# Patient Record
Sex: FEMALE | Race: BLACK OR AFRICAN AMERICAN | Employment: FULL TIME | URBAN - METROPOLITAN AREA
[De-identification: names, ages, dates, MRNs, and addresses within clinical notes are randomized per-mention and may not be internally consistent; named-entity substitution may affect disease eponyms.]

---

## 2017-01-18 ENCOUNTER — TRANSFERRED RECORDS (OUTPATIENT)
Dept: MULTI SPECIALTY CLINIC | Facility: CLINIC | Age: 42
End: 2017-01-18

## 2017-01-20 DIAGNOSIS — Z98.84 BARIATRIC SURGERY STATUS: Primary | ICD-10-CM

## 2017-01-20 RX ORDER — CYANOCOBALAMIN 1000 UG/ML
1 INJECTION, SOLUTION INTRAMUSCULAR; SUBCUTANEOUS
Qty: 1 ML | Refills: 11 | Status: SHIPPED | OUTPATIENT
Start: 2017-01-20 | End: 2018-12-03

## 2017-01-20 NOTE — TELEPHONE ENCOUNTER
cyanocobalamin (VITAMIN B12) 1000 MCG/ML injection  Last Written Prescription Date:  7/20/15  Last Fill Quantity: 1ml,   # refills: 11  Last visit:  2/10/16  Next visit:  3/7/17  bd tb syringe ordered per pt previous hx.  Routed because: cyanocobalamin (VITAMIN B12) 1000 MCG/ML injection. Not on SO protocol.

## 2017-02-03 ENCOUNTER — OFFICE VISIT (OUTPATIENT)
Dept: PSYCHOLOGY | Facility: CLINIC | Age: 42
End: 2017-02-03
Payer: COMMERCIAL

## 2017-02-03 DIAGNOSIS — F41.9 ANXIETY DISORDER, UNSPECIFIED TYPE: Primary | ICD-10-CM

## 2017-02-03 NOTE — PROGRESS NOTES
"Name:  Deisi Miner  Mrn: [id number]  Date of visit: 2/3/2017    PSYCHOLOGY OUTPATIENT VISIT NOTE    PRESENTING PROBLEMS/SYMPTOMS: Pregnancy with twins following IVF, birth on 10/18/14 and loss of Altaf on 11/5/14.  Grief.  Continuing Anxiety and difficulty with relaxing, (\"I always have to be thinking about something\".)  Muscle tension in shoulders and neck. Irritability. ( = Altagracia; Daughter = Moira, son = Altaf.) .  INTERVENTION AND RESPONSE:  CBT, Individual.  Presented with update of past 3 mos.  Realizing family disconnect and busy with work.  Able to take Sat, \"I need a day.\"  Fmaily of origin part of \"cult Lutheran\" and feeling I'm and outsider, loss and death of her family.  Belief, \"this is how it is so get over it.\"  Fidgety and restless, \"what to do to help this?\"  Sadness.  Fears of infinite sadness.  Option of working through sadness, inner child work.  Expressed interest and sense that this is where her sadness lies. Also reinforced progress.  HW: awareness of \"child sadness.\"    Previous visit: (being \"laughed at\" at work) \"feeling different from others\" starting in childhood.  Heavy heart, heart \"crumbling apart; don't let it.\"  Tense shoulders and neck, trying to rationalize, \"I just have to cope by myself.\"  If she allows others to see her tears, \"what will they think about me?\"  Fears. Previous visit:  Caught between \"I should fix it\" and \"they should fix it.\"  Anger, \"I can't just let anger rip,\" and \"not feeling safe,\" wanting to escape (feet).  Pattern.  Practiced probe: \"you're not safe.\"  Moved slowly away from therapist, crossed arms, \"I am holding myself together.\"  Practiced, \"yes I am.\"  Discussed and noted parts of body bracing (top part) and wanting to escape (lower part) and sense of tension between these two impulses.)   ASSESSMENT:  The patient presented as casually dressed and groomed.  Feet very restless.  Difficulty with grief around disconnect from family.  Sadness and " "on verge of tears.  Continuing context: Zeina death on 11/5/14. Doing well with use of social support and continuing difficulty finding more support in partner.  Continued importance of using social support, friends, mom in law.  Will be important to help her process grief around her family's lack of support anniversary of Altaf's death. Triggers: teachers \"not understanding\" kids who act out.  Wanting to \"get away from it,\" leaning back in her chair, \"pack it in; freeze and hide my emotions,\" difficulty finding words vs wanting to escape.   Character strategy, industrious overfocused.    DIAGNOSES:  Unspecified Anxiety disorder  PLAN:   Psychotherapy is medically necessary to treat anxiety.    Total time spent equals 55  minutes of individual psychotherapy       "

## 2017-03-03 ENCOUNTER — OFFICE VISIT (OUTPATIENT)
Dept: PSYCHOLOGY | Facility: CLINIC | Age: 42
End: 2017-03-03
Payer: COMMERCIAL

## 2017-03-03 DIAGNOSIS — F41.1 GAD (GENERALIZED ANXIETY DISORDER): Primary | ICD-10-CM

## 2017-03-03 NOTE — MR AVS SNAPSHOT
After Visit Summary   3/3/2017    Deisi Miner    MRN: [id number]           Patient Information     Date Of Birth          1975        Visit Information        Provider Department      3/3/2017 3:00 PM Marcela Dobson, PhD [facility]         Today's Diagnoses     DENISE (generalized anxiety disorder)    -  1       Follow-ups after your visit        Your next 10 appointments already scheduled     Mar 07, 2017  8:45 AM CST   (Arrive by 8:30 AM)   Return Visit with Lam Maher MD   [facility] ([facility])    [address]  [address] [facility] [address]   [phone]            Apr 05, 2017  1:00 PM CDT   Return Visit with Marcela Dobson, PhD [facility]   [facility]  ([facility])    [facility]  [address]rd [address]  [address]  [facility] [address] [phone]            Apr 06, 2017  3:30 PM CDT   MyChart Physical Adult with Samantha Sultana MD   [facility] ([facility])    [address] [place] [id number] [phone]            May 08, 2017  3:00 PM CDT   Return Visit with Marcela Dobson, PhD    [facility]  ([facility])    [facility]  [address]rd [address], [address]  [address]  [facility] [id number] [phone]              Who to contact     Please call your clinic at [phone] to:    Ask questions about your health    Make or cancel appointments    Discuss your medicines    Learn about your test results    Speak to your doctor   If you have compliments or concerns about an experience at your clinic, or if you wish to file a complaint, please contact [facility] Patient Relations at [phone] or email us at [email or web address]         Additional Information About Your Visit         [employer] Information     [employer] gives you secure access to your electronic health record. If you see a primary care provider, you can also send messages to your care team and make appointments. If you have questions, please call your primary care clinic.  If you do not have a primary care provider, please call [phone] and they will assist you.      [employer] is an electronic gateway that provides easy, online access to your medical records. With [employer], you can request a clinic appointment, read your test results, renew a prescription or communicate with your care team.     To access your existing account, please contact your [facility] or call [phone] for assistance.        Care EveryWhere ID     This is your Care EveryWhere ID. This could be used by other organizations to access your [place] medical records  [id number]         Blood Pressure from Last 3 Encounters:   02/10/16 121/78   02/07/16 120/80   11/24/15 109/75    Weight from Last 3 Encounters:   02/10/16 108.6 kg (239 lb 8 oz)   02/07/16 106.6 kg (235 lb)   11/24/15 105.4 kg (232 lb 4.8 oz)              We Performed the Following     Individual Psychotherapy - Psychotherapy with pt and/or family present 45 mins [38-52 mins] ([id number])        Primary Care Provider Office Phone # Fax #    Alejandro Gifford [phone]       [facility] [address] [place] [id number]        Thank you!     Thank you for choosing [facility]   for your care. Our goal is always to provide you with excellent care. Hearing back from our patients is one way we can continue to improve our services. Please take a few minutes to complete the written survey that you may receive in the mail after your visit with us. Thank you!             Your Updated Medication List - Protect others around you: Learn how to safely use, store and throw away your medicines at  "www.disposemymeds.org.          This list is accurate as of: 3/3/17  5:28 PM.  Always use your most recent med list.                   Brand Name Dispense Instructions for use    cyanocobalamin 1000 MCG/ML injection    VITAMIN B12    1 mL    Inject 1 mL (1,000 mcg) Subcutaneous every 30 days       phentermine 37.5 MG tablet    ADIPEX-P    30 tablet    Take 0.5 tablets (18.75 mg) by mouth every morning (before breakfast)       sertraline 100 MG tablet    ZOLOFT    90 tablet    Take 1 tablet (100 mg) by mouth daily       topiramate 25 MG tablet    TOPAMAX    90 tablet    25 mg at bedtime for 1 week, 50 mg at bedtime for 1 week and 75 mg daily at bedtime thereafter       Tuberculin-Allergy Syringes 25G X 5/8\" 1 ML [facility]    B-D TB SYRINGE    12 each    Use one every 30 days for cyanocobalamin (VITAMIN B12) injection.       VITAMIN D (CHOLECALCIFEROL) PO      Take by mouth daily         "

## 2017-03-03 NOTE — PROGRESS NOTES
"Name:  Deisi Miner  Mrn: [id number]  Date of visit: 3/3/2017    PSYCHOLOGY OUTPATIENT VISIT NOTE    PRESENTING PROBLEMS/SYMPTOMS: Pregnancy with twins following IVF, birth on 10/18/14 and loss of Altaf on 11/5/14.  Grief.  Continuing Anxiety and difficulty with relaxing, (\"I always have to be thinking about something\".)  Muscle tension in shoulders and neck. Irritability. ( = Altagracia; Daughter = Moira, son = Altaf.) .  INTERVENTION AND RESPONSE:  CBT, Individual.  Presented with new journal, writing letter to \"kimmie Lipscomb\" identifying her \"traumas\", letter to teen self, list of \"triggers,\" \"I know I'm anxious\" and increasing awareness of these sxs, neck and shoulder tension.  Focused on coping, Using PMR, standing desk, time with Moira, eating more produce, more water.  Discussed and Reinforced this progress.  Feeling \"rattled at work\" described stressor, racial and power aspects, and getting support from boss, unsure if she is \"disappointing,\" insomnia and worry.  Feedback that she apologizes a lot and recognizing this.  Mentoring students.  Wanting others to \"notice I'm super good,\" hx of embarassment re large family and poverty, not accepted by sibs and family but wanting to protect them, fears of being put in foster care.  Beliefs: that's how it is; no one needs to know.  Now sense of \"not knowing what to do, not having a guide\" while also believing that \"I need to know.\"    HW: writing, what does child self need and want?  Adult self?    Previous visit: (being \"laughed at\" at work) \"feeling different from others\" starting in childhood.  Heavy heart, heart \"crumbling apart; don't let it.\"  Tense shoulders and neck, trying to rationalize, \"I just have to cope by myself.\"  If she allows others to see her tears, \"what will they think about me?\"  Fears. Previous visit:  Caught between \"I should fix it\" and \"they should fix it.\"  Anger, \"I can't just let anger rip,\" and \"not feeling safe,\" wanting to " "escape (feet).  Pattern.  Practiced probe: \"you're not safe.\"  Moved slowly away from therapist, crossed arms, \"I am holding myself together.\"  Practiced, \"yes I am.\"  Discussed and noted parts of body bracing (top part) and wanting to escape (lower part) and sense of tension between these two impulses.)   ASSESSMENT:  The patient presented as casually dressed and groomed.  Feet very restless and moving around in chair, pt aware of this.  MOtivation towards working in therapy as her daughter is older and she is becoming more aware of the impact of anxiety on self.  Given sxs: restlessness, insomnia, irritability, muscle tension (neck shoulders) and difficulty controlling and distress, continuing over 6mos more than 50% of days pt does meet criteria for DENISE. Continuing context: Pregnancy with twins following IVF, birth on 10/18/14 and loss of Altaf on 11/5/14.  Grief. Ethans death on 11/5/14.   Continued importance of using social support, friends, mom in law.  Some difficulty with partner communication.  Will be important to help her process grief around her family's lack of support (chronic) Altaf's death. Family of origin part of \"cult Mosque\" and feeling I'm and outsider, loss and death of her family.  Belief, \"this is how it is so get over it.\"  Fidgety and restless, \"what to do to help this?\"  Sadness.  Fears of infinite sadness. Triggers: teachers \"not understanding\" kids who act out.  Wanting to \"get away from it,\" leaning back in her chair, \"pack it in; freeze and hide my emotions,\" difficulty finding words vs wanting to escape.   Character strategy, industrious overfocused.      DIAGNOSES:  DENISE  PLAN:   Psychotherapy is medically necessary to treat anxiety.    Total time spent equals 52  minutes of individual psychotherapy       "

## 2017-03-17 ENCOUNTER — MYC MEDICAL ADVICE (OUTPATIENT)
Dept: ENDOCRINOLOGY | Facility: CLINIC | Age: 42
End: 2017-03-17

## 2017-04-17 DIAGNOSIS — F41.1 GAD (GENERALIZED ANXIETY DISORDER): ICD-10-CM

## 2017-04-17 RX ORDER — SERTRALINE HYDROCHLORIDE 100 MG/1
100 TABLET, FILM COATED ORAL DAILY
Qty: 90 TABLET | Refills: 0 | Status: SHIPPED | OUTPATIENT
Start: 2017-04-17 | End: 2017-05-01

## 2017-04-17 NOTE — TELEPHONE ENCOUNTER
Pharmacy sent a refill request for Zoloft. Pt has AE scheduled for 05/01/2017. Refill sent per protocol. Va Hudson RN

## 2017-04-24 ENCOUNTER — OFFICE VISIT (OUTPATIENT)
Dept: ENDOCRINOLOGY | Facility: CLINIC | Age: 42
End: 2017-04-24

## 2017-04-24 VITALS
SYSTOLIC BLOOD PRESSURE: 118 MMHG | BODY MASS INDEX: 50.95 KG/M2 | DIASTOLIC BLOOD PRESSURE: 81 MMHG | HEIGHT: 60 IN | OXYGEN SATURATION: 96 % | HEART RATE: 87 BPM | WEIGHT: 259.5 LBS

## 2017-04-24 DIAGNOSIS — E66.01 MORBID OBESITY, UNSPECIFIED OBESITY TYPE (H): Primary | ICD-10-CM

## 2017-04-24 RX ORDER — TOPIRAMATE 25 MG/1
TABLET, FILM COATED ORAL
Qty: 90 TABLET | Refills: 1 | Status: SHIPPED | OUTPATIENT
Start: 2017-04-24 | End: 2017-05-22

## 2017-04-24 RX ORDER — PHENTERMINE HYDROCHLORIDE 15 MG/1
15 CAPSULE ORAL EVERY MORNING
Qty: 30 CAPSULE | Refills: 1 | Status: SHIPPED | OUTPATIENT
Start: 2017-04-24 | End: 2017-05-22

## 2017-04-24 ASSESSMENT — ENCOUNTER SYMPTOMS
HOT FLASHES: 0
SINUS CONGESTION: 0
RECTAL PAIN: 0
HYPOTENSION: 0
TROUBLE SWALLOWING: 0
SINUS PAIN: 0
JAUNDICE: 0
BLOOD IN STOOL: 0
DYSURIA: 0
CHILLS: 0
DIARRHEA: 0
COUGH: 0
SHORTNESS OF BREATH: 0
PARALYSIS: 0
SYNCOPE: 0
MUSCLE WEAKNESS: 1
FEVER: 0
NECK MASS: 0
EYE PAIN: 0
TACHYCARDIA: 0
POLYPHAGIA: 0
RESPIRATORY PAIN: 0
POSTURAL DYSPNEA: 0
ALTERED TEMPERATURE REGULATION: 0
JOINT SWELLING: 0
LEG SWELLING: 0
ORTHOPNEA: 0
POLYDIPSIA: 0
PANIC: 0
SPUTUM PRODUCTION: 0
SKIN CHANGES: 0
TASTE DISTURBANCE: 0
CONSTIPATION: 0
LOSS OF CONSCIOUSNESS: 0
EXERCISE INTOLERANCE: 0
HEARTBURN: 0
DOUBLE VISION: 0
DISTURBANCES IN COORDINATION: 0
DECREASED APPETITE: 0
MYALGIAS: 1
RECTAL BLEEDING: 0
INCREASED ENERGY: 0
BACK PAIN: 1
HEMATURIA: 0
DEPRESSION: 0
BOWEL INCONTINENCE: 0
WEIGHT LOSS: 0
HALLUCINATIONS: 0
MUSCLE CRAMPS: 0
NAUSEA: 0
SWOLLEN GLANDS: 0
DIFFICULTY URINATING: 0
SPEECH CHANGE: 0
TREMORS: 0
BREAST MASS: 0
POOR WOUND HEALING: 0
FLANK PAIN: 0
NECK PAIN: 1
SORE THROAT: 0
DECREASED CONCENTRATION: 1
ARTHRALGIAS: 1
EYE IRRITATION: 0
WEAKNESS: 0
SNORES LOUDLY: 0
BRUISES/BLEEDS EASILY: 0
HOARSE VOICE: 0
HEMOPTYSIS: 0
DYSPNEA ON EXERTION: 0
BREAST PAIN: 0
TINGLING: 0
WEIGHT GAIN: 0
CLAUDICATION: 0
INSOMNIA: 1
NERVOUS/ANXIOUS: 1
NIGHT SWEATS: 0
PALPITATIONS: 0
BLOATING: 0
HYPERTENSION: 0
WHEEZING: 0
VOMITING: 0
EYE WATERING: 0
HEADACHES: 1
ABDOMINAL PAIN: 0
SMELL DISTURBANCE: 0
LIGHT-HEADEDNESS: 0
NAIL CHANGES: 0
NUMBNESS: 0
FATIGUE: 0
COUGH DISTURBING SLEEP: 0
DIZZINESS: 0
STIFFNESS: 1
DECREASED LIBIDO: 1
LEG PAIN: 0
SLEEP DISTURBANCES DUE TO BREATHING: 0
MEMORY LOSS: 0
SEIZURES: 0
EYE REDNESS: 0

## 2017-04-24 NOTE — PATIENT INSTRUCTIONS
PLAN:   Restart phentermine 15mg  Topiramate ramp up to 75mg slowly to avoid tingling.      FOLLOW-UP:    4 weeks Teri Ramirez PA-C return MW  MEDICATION STARTED AT THIS APPOINTMENT    We are starting Phentermine. Take one tablet in the morning.  Call the nurse at [phone] if you have any questions or concerns. (Do not stop taking it if you don't think it's working. For some people it works without them knowing it.)    Phentermine is being prescribed because you identified hunger as one of the main causes for your extra weight.      Our patients on Phentermine find that they:    >feel less hunger    >find it easier to push the plate away   >have an easier time eating less    For some of our patients, these feelings are very real and immediate. For other patients, the feelings are less obvious. They don't feel much of a change but find they've lost weight. Like all weight loss medications, Phentermine  works best when you help it work. This means:  1. Having less tempting high calorie (fattening) food around the house or office. (For people with strong cravings this is very important.)   2. Staying away from situations or people that may trigger your cravings .   3. Eating out only one time or less each week.  4. Eating your meals at a table with the TV or computer off.    Side-effects. Phentermine is generally well tolerated. The main side-effects we see are feelings of racing pulse or rapid heart beat. Some people can get an elevated blood pressure. Because of this we may have you come back within a week or so of starting the medication for a blood pressure check.         In order to get refills of this or any medication we prescribe you must be seen in the medical weight mgmt clinic every 2-3 months. Please have your pharmacy fax a refill request to [phone].      MEDICATION STARTED AT THIS APPOINTMENT  We are starting topiramate at bedtime.  Start one tab, 25 mg, for a week. Go up to 50 mg (2 tabs) for  the next week. At the third week, take   3 tabs (75 mg).  Stay at 3 tabs until you are seen again. Call the nurse at [phone] if you have any questions or concerns. (Do not stop taking it if you don't think it's working. For some people it works even though they do not feel much different.)    Topiramate (Topamax) is a medication that is used most often to treat migraine headaches or for seizures. It has also been found to help with weight loss. Although it's not currently FDA approved for weight loss, it has been used safely for a number of years to help people who are carrying extra weight.     Just how topiramate helps with weight loss has not been exactly determined. However it seems to work on areas of the brain to quiet down signals related to eating.      Topiramate may make you:    >feel less interest in eating in between meals   >think less about food and eating   >find it easier to push the plate away   >find giving up pop easier    >have an easier time eating less    For some of our patients, the pills work right away. They feel and think quite differently about food. Other patients don't feel much of a change but find in fact they have lost weight! Like all weight loss medications, topiramate works best when you help it work.  This means:    1) Have less tempting high calorie (fattening) food around the house or office    2) Have lower calorie food (fruits, vegetables,low fat meats and dairy) for snacks    3) Eat out only one time or less each week.   4) Eat your meals at a table with the TV or computer off.    Side-effects. Topiramate is generally well tolerated. The main side-effects we see are:   Tingling in hands,feet, or face (usually not very troublesome)   Mental confusion and word finding trouble (about 10% of patients have this.)     Feeling sleepy or a bit dopey- this goes away very soon after starting.    One of the dangers of topiramate is the possibility of birth defects--if you get  pregnant when you are on it, there is the risk that your baby will be born with a cleft lip or palate.  If you are on topiramate and of child bearing age, you need to be on a reliable form of birth control or refrain from sexual intercourse.     Please refer to the pharmacy insert for more information on side-effects. Since many pharmacists are not familiar with the use of topiramate in weight loss, calling the clinic will get you the most accurate information on the use of this medication for weight loss.     In order to get refills of this or any medication we prescribe you must be seen in the medical weight mgmt clinic every 2-3 months. Please have your pharmacy fax a refill request to [phone].

## 2017-04-24 NOTE — LETTER
"2017       RE: Deisi Miner  [address] [place] [place] [address]     Dear Colleague,    Thank you for referring your patient, Deisi Miner, to the [facility]NAG[facility] at [facility]. Please see a copy of my visit note below.    Return Medical Weight Management Note     Deisi Miner  MRN:  [id number]  :  1975  MARIANO:  2017    Dear Alejandro Gifford,    I had the pleasure of seeing your patient Deisi Miner.  She is a 42 year old female who I am continuing to see for treatment of obesity related to:    No flowsheet data found.    INTERVAL HISTORY:  Note from Dr Fritz 2/10/16\"  \"She is s/p laparoscopic sleeve gastrectomy  with preoperative weight of 245 and postoperative lowest weight was 164. She was pregnant in . Her post-delivery weight was 184 in 2014. She gained almost 20 lbs in the past year\"    She was taking phentermine 18.75 and topiramate 75mg.  She wasn't able to follow up and ran out of phentermine and the topiramate caused some tingling at 75mg so she discontinued both of them over a year ago.  She felt they both helped.      CURRENT WEIGHT:   259 lbs 8 oz    Wt Readings from Last 4 Encounters:   17 259 lb 8 oz   02/10/16 239 lb 8 oz   16 235 lb   11/24/15 232 lb 4.8 oz     Height:  5' 0\"  Body Mass Index:  Body mass index is 50.68 kg/(m^2).  Vitals:  /81 (BP Location: Left arm)  Pulse 87  Ht 5'  Wt 259 lb 8 oz  SpO2 96%  BMI 50.68 kg/m2    Initial consult weight was 232 on 2015.  Weight change since last seen on Visit date not found is up 20 pounds.   Total gain is 27 pounds.    Diet and Activity Changes Since Last Visit Reviewed With Patient 2017   I have made the following changes to my diet since my last visit: more fruit veggies ssmall meals   With regards to my diet, I am still struggling with: portion control   For breakfast, I typically eat: yogurt orvoatmeal   For " lunch, I typically eat: protein and veggie   For supper, I typically eat: protein veggie bread   For snack(s), I typically eat: crackers pretzils   I have made the following changes to my activity/exercise since my last visit: activity eachday walking   With regards to my activity/exercise, I am still struggling with: knee pain       Review of Systems     Constitutional:  Negative for fever, chills, weight loss, weight gain, fatigue, decreased appetite, night sweats, recent stressors, height gain, height loss, post-operative complications, incisional pain, hallucinations, increased energy, hyperactivity and confused.   HENT:  Negative for ear pain, hearing loss, tinnitus, nosebleeds, trouble swallowing, hoarse voice, mouth sores, sore throat, ear discharge, tooth pain, gum tenderness, taste disturbance, smell disturbance, hearing aid, bleeding gums, dry mouth, sinus pain, sinus congestion and neck mass.    Eyes:  Negative for double vision, pain, redness, eye pain, decreased vision, eye watering, eye bulging, eye dryness, flashing lights, spots, floaters, strabismus, tunnel vision, jaundice and eye irritation.   Respiratory:   Negative for cough, hemoptysis, sputum production, shortness of breath, wheezing, sleep disturbances due to breathing, snores loudly, respiratory pain, dyspnea on exertion, cough disturbing sleep and postural dyspnea.    Cardiovascular:  Negative for chest pain, dyspnea on exertion, palpitations, orthopnea, claudication, leg swelling, fingers/toes turn blue, hypertension, hypotension, syncope, history of heart murmur, chest pain on exertion, chest pain at rest, pacemaker, few scattered varicosities, leg pain, sleep disturbances due to breathing, tachycardia, light-headedness, exercise intolerance and edema.   Gastrointestinal:  Negative for heartburn, nausea, vomiting, abdominal pain, diarrhea, constipation, blood in stool, melena, rectal pain, bloating, hemorrhoids, bowel incontinence,  "jaundice, rectal bleeding, coffee ground emesis and change in stool.   Genitourinary:  Positive for decreased libido and excessive menstruation. Negative for bladder incontinence, dysuria, urgency, hematuria, flank pain, vaginal discharge, difficulty urinating, genital sores, dyspareunia, nocturia, voiding less frequently, arousal difficulty, abnormal vaginal bleeding, menstrual changes, hot flashes, vaginal dryness and postmenopausal bleeding.   Musculoskeletal:  Positive for myalgias, back pain, arthralgias, stiffness, neck pain and muscle weakness. Negative for joint swelling, muscle cramps, bone pain and fracture.   Skin:  Negative for nail changes, itching, poor wound healing, rash, hair changes, skin changes, acne, warts, poor wound healing, scarring, flaky skin, Raynaud's phenomenon, sensitivity to sunlight and skin thickening.   Neurological:  Positive for headaches. Negative for dizziness, tingling, tremors, speech change, seizures, loss of consciousness, weakness, light-headedness, numbness, disturbances in coordination, memory loss, difficulty walking and paralysis.   Endo/Heme:  Negative for anemia, swollen glands and bruises/bleeds easily.   Psychiatric/Behavioral:  Positive for decreased concentration and mood swings. Negative for depression, hallucinations, memory loss and panic attacks.    Breast:  Negative for breast discharge, breast mass, breast pain and nipple retraction.   Endocrine:  Negative for altered temperature regulation, polyphagia, polydipsia, unwanted hair growth and change in facial hair.      MEDICATIONS:   Current Outpatient Prescriptions   Medication     sertraline (ZOLOFT) 100 MG tablet     Tuberculin-Allergy Syringes (B-D TB SYRINGE) 25G X 5/8\" 1 ML MISC     cyanocobalamin (VITAMIN B12) 1000 MCG/ML injection     VITAMIN D, CHOLECALCIFEROL, PO     topiramate (TOPAMAX) 25 MG tablet     phentermine (ADIPEX-P) 37.5 MG tablet     No current facility-administered medications for this " visit.        Weight Loss Medication History Reviewed With Patient 4/24/2017   Which weight loss medications are you currently taking on a regular basis?  None   If you are not taking a weight loss medication that was prescribed to you, please indicate why: I did not like the side effects, It did not seem to be helping me   Are you having any side effects from the weight loss medication that we have prescribed you? No   If you are having side effects please describe: -       ASSESSMENT:   42 y.o. Female s/p Lap sleeve gastrectomy here for MWM follow up.  Weight regain since 2014 pregnancy.      PLAN:   Restart phentermine 15mg  Topiramate ramp up to 75mg slowly to avoid tingling.      FOLLOW-UP:    4 weeks Teri Ramirez PA-C    Time: 20 min spent on evaluation, management, counseling, education, & motivational interviewing with greater than 50 % of the total time was spent on counseling and coordinating care    Sincerely,    Teri Ramirez PA-C

## 2017-04-24 NOTE — MR AVS SNAPSHOT
After Visit Summary   4/24/2017    Deisi Miner    MRN: [id number]           Patient Information     Date Of Birth          1975        Visit Information        Provider Department      4/24/2017 9:45 AM Teri Ramirez PA-C [facility] Medical Weight Management        Today's Diagnoses     Morbid obesity, unspecified obesity type (H)    -  1      Care Instructions    PLAN:   Restart phentermine 15mg  Topiramate ramp up to 75mg slowly to avoid tingling.      FOLLOW-UP:    4 weeks Teri Ramirez PA-C return MWM  MEDICATION STARTED AT THIS APPOINTMENT    We are starting Phentermine. Take one tablet in the morning.  Call the nurse at [phone] if you have any questions or concerns. (Do not stop taking it if you don't think it's working. For some people it works without them knowing it.)    Phentermine is being prescribed because you identified hunger as one of the main causes for your extra weight.      Our patients on Phentermine find that they:    >feel less hunger    >find it easier to push the plate away   >have an easier time eating less    For some of our patients, these feelings are very real and immediate. For other patients, the feelings are less obvious. They don't feel much of a change but find they've lost weight. Like all weight loss medications, Phentermine  works best when you help it work. This means:  1. Having less tempting high calorie (fattening) food around the house or office. (For people with strong cravings this is very important.)   2. Staying away from situations or people that may trigger your cravings .   3. Eating out only one time or less each week.  4. Eating your meals at a table with the TV or computer off.    Side-effects. Phentermine is generally well tolerated. The main side-effects we see are feelings of racing pulse or rapid heart beat. Some people can get an elevated blood pressure. Because of this we may have you come back within a week or so of  starting the medication for a blood pressure check.         In order to get refills of this or any medication we prescribe you must be seen in the medical weight mgmt clinic every 2-3 months. Please have your pharmacy fax a refill request to [phone].      MEDICATION STARTED AT THIS APPOINTMENT  We are starting topiramate at bedtime.  Start one tab, 25 mg, for a week. Go up to 50 mg (2 tabs) for the next week. At the third week, take   3 tabs (75 mg).  Stay at 3 tabs until you are seen again. Call the nurse at [phone] if you have any questions or concerns. (Do not stop taking it if you don't think it's working. For some people it works even though they do not feel much different.)    Topiramate (Topamax) is a medication that is used most often to treat migraine headaches or for seizures. It has also been found to help with weight loss. Although it's not currently FDA approved for weight loss, it has been used safely for a number of years to help people who are carrying extra weight.     Just how topiramate helps with weight loss has not been exactly determined. However it seems to work on areas of the brain to quiet down signals related to eating.      Topiramate may make you:    >feel less interest in eating in between meals   >think less about food and eating   >find it easier to push the plate away   >find giving up pop easier    >have an easier time eating less    For some of our patients, the pills work right away. They feel and think quite differently about food. Other patients don't feel much of a change but find in fact they have lost weight! Like all weight loss medications, topiramate works best when you help it work.  This means:    1) Have less tempting high calorie (fattening) food around the house or office    2) Have lower calorie food (fruits, vegetables,low fat meats and dairy) for snacks    3) Eat out only one time or less each week.   4) Eat your meals at a table with the TV or computer  off.    Side-effects. Topiramate is generally well tolerated. The main side-effects we see are:   Tingling in hands,feet, or face (usually not very troublesome)   Mental confusion and word finding trouble (about 10% of patients have this.)     Feeling sleepy or a bit dopey- this goes away very soon after starting.    One of the dangers of topiramate is the possibility of birth defects--if you get pregnant when you are on it, there is the risk that your baby will be born with a cleft lip or palate.  If you are on topiramate and of child bearing age, you need to be on a reliable form of birth control or refrain from sexual intercourse.     Please refer to the pharmacy insert for more information on side-effects. Since many pharmacists are not familiar with the use of topiramate in weight loss, calling the clinic will get you the most accurate information on the use of this medication for weight loss.     In order to get refills of this or any medication we prescribe you must be seen in the medical weight mgmt clinic every 2-3 months. Please have your pharmacy fax a refill request to [phone].                Follow-ups after your visit        Follow-up notes from your care team     Return in 4 weeks (on 5/22/2017).      Your next 10 appointments already scheduled     May 01, 2017  9:30 AM CDT   [facility]   [facility] ([facility])    [address] [place] [address]   [phone]            May 08, 2017  3:00 PM CDT   Return Visit with Marcela Dobson, PhD    [facility]  ([facility])    [facility]  [address]rd [address]  [address] [address]   [phone]            Jun 06, 2017  2:00 PM CDT   Return Visit with Marcela Dobson, PhD MARJORIE   [facility]  ([facility])    [facility]  [address]  [facility] [address]   [phone]            Jun 20, 2017  3:00 PM CDT   Return Visit with Marcela Dobson, PhD MARJORIE   [facility]  ([facility])    [facility]  [address]rd [address], [address]  [facility] [address]   [phone]            Jul 11, 2017  3:00 PM CDT   Return Visit with Marcela Dobson, PhD MARJORIE   [facility]  ([facility])    [facility]  [address]rd [address]  [facility] [address]   [phone]            Jul 25, 2017  3:00 PM CDT   Return Visit with Marcela Dobson, PhD MARJORIE   [facility]  ([facility])    [facility]  [address]rd [address]  [address]  [facility] [id number] [phone]              Who to contact     Please call your clinic at [phone] to:    Ask questions about your health    Make or cancel appointments    Discuss your medicines    Learn about your test results    Speak to your doctor   If you have compliments or concerns about an experience at your clinic, or if you wish to file a complaint, please contact [facility] Patient Relations at [phone] or email us at [email or web address]         Additional Information About Your Visit        MyChart Information     Message System[employer] gives you secure access to your electronic health record. If you see a primary care provider, you can also send messages to your care team and make appointments. If you have questions, please call your primary care clinic.  If you do not have a primary care provider, please call [phone] and they will assist you.      [employer] is an electronic gateway that provides easy, online access to your medical records. With [employer], you can request a clinic appointment, read your test results, renew a prescription or communicate with your care team.     To access your existing  account, please contact your [facility] or call [phone] for assistance.        Care EveryWhere ID     This is your Care EveryWhere ID. This could be used by other organizations to access your [place] medical records  [id number]        Your Vitals Were     Pulse Height Pulse Oximetry BMI (Body Mass Index)          87 5' 96% 50.68 kg/m2         Blood Pressure from Last 3 Encounters:   04/24/17 118/81   02/10/16 121/78   02/07/16 120/80    Weight from Last 3 Encounters:   04/24/17 259 lb 8 oz   02/10/16 239 lb 8 oz   02/07/16 235 lb              Today, you had the following     No orders found for display         Today's Medication Changes          These changes are accurate as of: 4/24/17 10:33 AM.  If you have any questions, ask your nurse or doctor.               These medicines have changed or have updated prescriptions.        Dose/Directions    * phentermine 37.5 MG tablet   Commonly known as:  ADIPEX-P   This may have changed:  Another medication with the same name was added. Make sure you understand how and when to take each.   Used for:  Non morbid obesity, unspecified obesity type   Changed by:  Lam Maher MD        Dose:  18.75 mg   Take 0.5 tablets (18.75 mg) by mouth every morning (before breakfast)   Quantity:  30 tablet   Refills:  1       * phentermine 15 MG capsule   This may have changed:  You were already taking a medication with the same name, and this prescription was added. Make sure you understand how and when to take each.   Used for:  Morbid obesity, unspecified obesity type (H)   Changed by:  Teri Ramirez PA-C        Dose:  15 mg   Take 1 capsule (15 mg) by mouth every morning   Quantity:  30 capsule   Refills:  1       * topiramate 25 MG tablet   Commonly known as:  TOPAMAX   This may have changed:  Another medication with the same name was added. Make sure you understand how and when to take each.   Used for:  Non morbid obesity  due to excess calories   Changed by:  Lam Maher MD        25 mg at bedtime for 1 week, 50 mg at bedtime for 1 week and 75 mg daily at bedtime thereafter   Quantity:  90 tablet   Refills:  2       * topiramate 25 MG tablet   Commonly known as:  TOPAMAX   This may have changed:  You were already taking a medication with the same name, and this prescription was added. Make sure you understand how and when to take each.   Used for:  Morbid obesity, unspecified obesity type (H)   Changed by:  Teri Ramirez PA-C        25mg at bedtime for week 1, 50mg at bedtime for 1 week, and 75mg at bedtime thereafter   Quantity:  90 tablet   Refills:  1       * Notice:  This list has 4 medication(s) that are the same as other medications prescribed for you. Read the directions carefully, and ask your doctor or other care provider to review them with you.         Where to get your medicines      These medications were sent to [facility] [address] [place] [address] [address] [facility] [address]     Phone:  [phone]     topiramate 25 MG tablet         Some of these will need a paper prescription and others can be bought over the counter.  Ask your nurse if you have questions.     Bring a paper prescription for each of these medications     phentermine 15 MG capsule                Primary Care Provider Office Phone # Fax #    Alejandro Jasiel Gifford [phone]       [facility] [address] [address]  [facility] [address]        Thank you!     Thank you for choosing [facility] MANAGEMENT  for your care. Our goal is always to provide you with excellent care. Hearing back from our patients is one way we can continue to improve our services. Please take a few minutes to complete the written survey that you may receive in the mail after your visit with us. Thank you!             Your Updated Medication List - Protect others around you: Learn how  "to safely use, store and throw away your medicines at www.disposemymeds.org.          This list is accurate as of: 4/24/17 10:33 AM.  Always use your most recent med list.                   Brand Name Dispense Instructions for use    cyanocobalamin 1000 MCG/ML injection    VITAMIN B12    1 mL    Inject 1 mL (1,000 mcg) Subcutaneous every 30 days       * phentermine 37.5 MG tablet    ADIPEX-P    30 tablet    Take 0.5 tablets (18.75 mg) by mouth every morning (before breakfast)       * phentermine 15 MG capsule     30 capsule    Take 1 capsule (15 mg) by mouth every morning       sertraline 100 MG tablet    ZOLOFT    90 tablet    Take 1 tablet (100 mg) by mouth daily       * topiramate 25 MG tablet    TOPAMAX    90 tablet    25 mg at bedtime for 1 week, 50 mg at bedtime for 1 week and 75 mg daily at bedtime thereafter       * topiramate 25 MG tablet    TOPAMAX    90 tablet    25mg at bedtime for week 1, 50mg at bedtime for 1 week, and 75mg at bedtime thereafter       Tuberculin-Allergy Syringes 25G X 5/8\" 1 ML [facility]    B-D TB SYRINGE    12 each    Use one every 30 days for cyanocobalamin (VITAMIN B12) injection.       VITAMIN D (CHOLECALCIFEROL) PO      Take by mouth daily       * Notice:  This list has 4 medication(s) that are the same as other medications prescribed for you. Read the directions carefully, and ask your doctor or other care provider to review them with you.      "

## 2017-04-24 NOTE — PROGRESS NOTES
"Return Medical Weight Management Note     Deisi Miner  MRN:  [id number]  :  1975  MARIANO:  2017    Dear Ирина, Alejandro Weathers,    I had the pleasure of seeing your patient Deisi Miner.  She is a 42 year old female who I am continuing to see for treatment of obesity related to:    No flowsheet data found.    INTERVAL HISTORY:  Note from Dr Fritz 2/10/16\"  \"She is s/p laparoscopic sleeve gastrectomy  with preoperative weight of 245 and postoperative lowest weight was 164. She was pregnant in . Her post-delivery weight was 184 in 2014. She gained almost 20 lbs in the past year\"    She was taking phentermine 18.75 and topiramate 75mg.  She wasn't able to follow up and ran out of phentermine and the topiramate caused some tingling at 75mg so she discontinued both of them over a year ago.  She felt they both helped.      CURRENT WEIGHT:   259 lbs 8 oz    Wt Readings from Last 4 Encounters:   17 259 lb 8 oz   02/10/16 239 lb 8 oz   16 235 lb   11/24/15 232 lb 4.8 oz     Height:  5' 0\"  Body Mass Index:  Body mass index is 50.68 kg/(m^2).  Vitals:  /81 (BP Location: Left arm)  Pulse 87  Ht 5'  Wt 259 lb 8 oz  SpO2 96%  BMI 50.68 kg/m2    Initial consult weight was 232 on 2015.  Weight change since last seen on Visit date not found is up 20 pounds.   Total gain is 27 pounds.    Diet and Activity Changes Since Last Visit Reviewed With Patient 2017   I have made the following changes to my diet since my last visit: more fruit veggies ssmall meals   With regards to my diet, I am still struggling with: portion control   For breakfast, I typically eat: yogurt orvoatmeal   For lunch, I typically eat: protein and veggie   For supper, I typically eat: protein veggie bread   For snack(s), I typically eat: crackers pretzils   I have made the following changes to my activity/exercise since my last visit: activity eachday walking   With regards to my activity/exercise, " I am still struggling with: knee pain       Review of Systems     Constitutional:  Negative for fever, chills, weight loss, weight gain, fatigue, decreased appetite, night sweats, recent stressors, height gain, height loss, post-operative complications, incisional pain, hallucinations, increased energy, hyperactivity and confused.   HENT:  Negative for ear pain, hearing loss, tinnitus, nosebleeds, trouble swallowing, hoarse voice, mouth sores, sore throat, ear discharge, tooth pain, gum tenderness, taste disturbance, smell disturbance, hearing aid, bleeding gums, dry mouth, sinus pain, sinus congestion and neck mass.    Eyes:  Negative for double vision, pain, redness, eye pain, decreased vision, eye watering, eye bulging, eye dryness, flashing lights, spots, floaters, strabismus, tunnel vision, jaundice and eye irritation.   Respiratory:   Negative for cough, hemoptysis, sputum production, shortness of breath, wheezing, sleep disturbances due to breathing, snores loudly, respiratory pain, dyspnea on exertion, cough disturbing sleep and postural dyspnea.    Cardiovascular:  Negative for chest pain, dyspnea on exertion, palpitations, orthopnea, claudication, leg swelling, fingers/toes turn blue, hypertension, hypotension, syncope, history of heart murmur, chest pain on exertion, chest pain at rest, pacemaker, few scattered varicosities, leg pain, sleep disturbances due to breathing, tachycardia, light-headedness, exercise intolerance and edema.   Gastrointestinal:  Negative for heartburn, nausea, vomiting, abdominal pain, diarrhea, constipation, blood in stool, melena, rectal pain, bloating, hemorrhoids, bowel incontinence, jaundice, rectal bleeding, coffee ground emesis and change in stool.   Genitourinary:  Positive for decreased libido and excessive menstruation. Negative for bladder incontinence, dysuria, urgency, hematuria, flank pain, vaginal discharge, difficulty urinating, genital sores, dyspareunia,  "nocturia, voiding less frequently, arousal difficulty, abnormal vaginal bleeding, menstrual changes, hot flashes, vaginal dryness and postmenopausal bleeding.   Musculoskeletal:  Positive for myalgias, back pain, arthralgias, stiffness, neck pain and muscle weakness. Negative for joint swelling, muscle cramps, bone pain and fracture.   Skin:  Negative for nail changes, itching, poor wound healing, rash, hair changes, skin changes, acne, warts, poor wound healing, scarring, flaky skin, Raynaud's phenomenon, sensitivity to sunlight and skin thickening.   Neurological:  Positive for headaches. Negative for dizziness, tingling, tremors, speech change, seizures, loss of consciousness, weakness, light-headedness, numbness, disturbances in coordination, memory loss, difficulty walking and paralysis.   Endo/Heme:  Negative for anemia, swollen glands and bruises/bleeds easily.   Psychiatric/Behavioral:  Positive for decreased concentration and mood swings. Negative for depression, hallucinations, memory loss and panic attacks.    Breast:  Negative for breast discharge, breast mass, breast pain and nipple retraction.   Endocrine:  Negative for altered temperature regulation, polyphagia, polydipsia, unwanted hair growth and change in facial hair.      MEDICATIONS:   Current Outpatient Prescriptions   Medication     sertraline (ZOLOFT) 100 MG tablet     Tuberculin-Allergy Syringes (B-D TB SYRINGE) 25G X 5/8\" 1 ML [facility]ISC     cyanocobalamin (VITAMIN B12) 1000 MCG/ML injection     VITAMIN D, CHOLECALCIFEROL, PO     topiramate (TOPAMAX) 25 MG tablet     phentermine (ADIPEX-P) 37.5 MG tablet     No current facility-administered medications for this visit.        Weight Loss Medication History Reviewed With Patient 4/24/2017   Which weight loss medications are you currently taking on a regular basis?  None   If you are not taking a weight loss medication that was prescribed to you, please indicate why: I did not like the side effects, " It did not seem to be helping me   Are you having any side effects from the weight loss medication that we have prescribed you? No   If you are having side effects please describe: -       ASSESSMENT:   42 y.o. Female s/p Lap sleeve gastrectomy here for MWM follow up.  Weight regain since 2014 pregnancy.      PLAN:   Restart phentermine 15mg  Topiramate ramp up to 75mg slowly to avoid tingling.      FOLLOW-UP:    4 weeks Teri Ramirez PA-C    Time: 20 min spent on evaluation, management, counseling, education, & motivational interviewing with greater than 50 % of the total time was spent on counseling and coordinating care    Sincerely,    Teri Ramirez PANEHEMIAH

## 2017-04-24 NOTE — NURSING NOTE
Chief Complaint   Patient presents with     Weight Problem     RMWM       Vitals:    04/24/17 0959   BP: 118/81   BP Location: Left arm   Pulse: 87   SpO2: 96%   Weight: 259 lb 8 oz   Height: 5'       Body mass index is 50.68 kg/(m^2).    Oswald Hart, CMA

## 2017-05-01 ENCOUNTER — RESULT FOLLOW UP (OUTPATIENT)
Dept: OBGYN | Facility: CLINIC | Age: 42
End: 2017-05-01

## 2017-05-01 ENCOUNTER — OFFICE VISIT (OUTPATIENT)
Dept: OBGYN | Facility: CLINIC | Age: 42
End: 2017-05-01
Payer: COMMERCIAL

## 2017-05-01 VITALS
DIASTOLIC BLOOD PRESSURE: 80 MMHG | SYSTOLIC BLOOD PRESSURE: 104 MMHG | TEMPERATURE: 97.5 F | HEART RATE: 101 BPM | BODY MASS INDEX: 47.84 KG/M2 | WEIGHT: 260 LBS | HEIGHT: 62 IN | OXYGEN SATURATION: 99 %

## 2017-05-01 DIAGNOSIS — Z01.419 ENCOUNTER FOR GYNECOLOGICAL EXAMINATION WITHOUT ABNORMAL FINDING: Primary | ICD-10-CM

## 2017-05-01 DIAGNOSIS — F41.1 GAD (GENERALIZED ANXIETY DISORDER): ICD-10-CM

## 2017-05-01 DIAGNOSIS — Z11.3 SCREENING EXAMINATION FOR VENEREAL DISEASE: ICD-10-CM

## 2017-05-01 DIAGNOSIS — N92.1 MENORRHAGIA WITH IRREGULAR CYCLE: ICD-10-CM

## 2017-05-01 LAB
DEPRECATED CALCIDIOL+CALCIFEROL SERPL-MC: 33 UG/L (ref 20–75)
ERYTHROCYTE [DISTWIDTH] IN BLOOD BY AUTOMATED COUNT: 17.1 % (ref 10–15)
HBV SURFACE AG SERPL QL IA: NONREACTIVE
HCT VFR BLD AUTO: 38.4 % (ref 35–47)
HCV AB SERPL QL IA: NORMAL
HGB BLD-MCNC: 12.3 G/DL (ref 11.7–15.7)
HIV 1+2 AB+HIV1 P24 AG SERPL QL IA: NORMAL
MCH RBC QN AUTO: 25.6 PG (ref 26.5–33)
MCHC RBC AUTO-ENTMCNC: 32 G/DL (ref 31.5–36.5)
MCV RBC AUTO: 80 FL (ref 78–100)
PLATELET # BLD AUTO: 326 10E9/L (ref 150–450)
RBC # BLD AUTO: 4.81 10E12/L (ref 3.8–5.2)
WBC # BLD AUTO: 6.8 10E9/L (ref 4–11)

## 2017-05-01 PROCEDURE — 87624 HPV HI-RISK TYP POOLED RSLT: CPT | Performed by: OBSTETRICS & GYNECOLOGY

## 2017-05-01 PROCEDURE — 87340 HEPATITIS B SURFACE AG IA: CPT | Performed by: OBSTETRICS & GYNECOLOGY

## 2017-05-01 PROCEDURE — 82306 VITAMIN D 25 HYDROXY: CPT | Performed by: OBSTETRICS & GYNECOLOGY

## 2017-05-01 PROCEDURE — 87491 CHLMYD TRACH DNA AMP PROBE: CPT | Performed by: OBSTETRICS & GYNECOLOGY

## 2017-05-01 PROCEDURE — G0145 SCR C/V CYTO,THINLAYER,RESCR: HCPCS | Performed by: OBSTETRICS & GYNECOLOGY

## 2017-05-01 PROCEDURE — 87389 HIV-1 AG W/HIV-1&-2 AB AG IA: CPT | Performed by: OBSTETRICS & GYNECOLOGY

## 2017-05-01 PROCEDURE — 86803 HEPATITIS C AB TEST: CPT | Performed by: OBSTETRICS & GYNECOLOGY

## 2017-05-01 PROCEDURE — 36415 COLL VENOUS BLD VENIPUNCTURE: CPT | Performed by: OBSTETRICS & GYNECOLOGY

## 2017-05-01 PROCEDURE — 85027 COMPLETE CBC AUTOMATED: CPT | Performed by: OBSTETRICS & GYNECOLOGY

## 2017-05-01 PROCEDURE — 86780 TREPONEMA PALLIDUM: CPT | Performed by: OBSTETRICS & GYNECOLOGY

## 2017-05-01 PROCEDURE — 87591 N.GONORRHOEAE DNA AMP PROB: CPT | Performed by: OBSTETRICS & GYNECOLOGY

## 2017-05-01 PROCEDURE — 99396 PREV VISIT EST AGE 40-64: CPT | Performed by: OBSTETRICS & GYNECOLOGY

## 2017-05-01 RX ORDER — SERTRALINE HYDROCHLORIDE 100 MG/1
100 TABLET, FILM COATED ORAL DAILY
Qty: 90 TABLET | Refills: 3 | Status: SHIPPED | OUTPATIENT
Start: 2017-05-01 | End: 2018-07-02

## 2017-05-01 NOTE — LETTER
April 17, 2018      Deisi DIONNE Isidra  [address] [place] [address]    Dear MsKyle,      At [facility], your health and wellness is our primary concern. That is why we are following up on a positive high risk HPV test from 5/1/17. Your provider had recommended that you have a Pap smear and HPV test completed by 5/1/18. Our records do not show that this has been scheduled.    It is important to complete the follow up that your provider has suggested for you to ensure that there are no worsening changes which may, over time, develop into cancer.      Please contact our office at  [phone] to schedule an appointment for a Pap smear and HPV test at your earliest convenience. If you have questions or concerns, please call the clinic and we will be happy to assist you.    If you have completed the tests outside of [facility], please have the results forwarded to our office. We will update the chart for your primary Physician to review before your next annual physical.     Thank you for choosing [facility]!    Sincerely,      DANE BELL MD/dayna

## 2017-05-01 NOTE — MR AVS SNAPSHOT
After Visit Summary   5/1/2017    Deisi Miner    MRN: [id number]           Patient Information     Date Of Birth          1975        Visit Information        Provider Department      5/1/2017 9:30 AM Samantha Sultana MD [facility]        Today's Diagnoses     Encounter for gynecological examination without abnormal finding    -  1    Screening examination for venereal disease        DENISE (generalized anxiety disorder)        Menorrhagia with irregular cycle          Care Instructions    mirena IUD website (an option for heavy menses)    Call [phone] to schedule ultrasound for check uterus and ovaries.  Listen to prompts for the surgery and ultrasound .        Follow-ups after your visit        Your next 10 appointments already scheduled     May 08, 2017  3:00 PM CDT   Return Visit with Marcela Dobson, PhD MARJORIE   [facility]  ([facility])    [facility]  [address]rd [address]r, [address]  [address] [place], [place] [address] [facility] [address] [phone]            May 22, 2017  7:45 AM CDT   (Arrive by 7:30 AM)   Return Weight Management Visit with Teri Ramirez PA-C   [facility] ([facility])    [address] [place] [address]  4th [facility] [address] [phone]            Jun 06, 2017  2:00 PM CDT   Return Visit with Marcela Dobson, PhD    [facility]  ([facility])    [facility]  [address]rd Flr, [address]  [address]  [facility] [address] [phone]            Jun 20, 2017  3:00 PM CDT   Return Visit with Marcela Dobson, PhD MARJORIE   [facility]  ([facility])    [facility]  [address]rd Flr, [address]  [address]  [facility] [address] [phone]            Jul 11, 2017  3:00 PM CDT   Return Visit with Marcela Dobson, PhD    [facility]   ([facility])    [facility]  3rd Flr, [address]  [address]  [facility] [address]   [phone]            Jul 25, 2017  3:00 PM CDT   Return Visit with Marcela Dobson, PhD LP   [facility]  ([facility])    [facility]  [address]rd Flr, [address]  [address]  [facility] [address]   [phone]              Future tests that were ordered for you today     Open Future Orders        Priority Expected Expires Ordered    US Pelvic Complete with Transvaginal Routine 7/30/2017 10/28/2017 5/1/2017            Who to contact     If you have questions or need follow up information about today's clinic visit or your schedule please contact [facility] directly at [phone].  Normal or non-critical lab and imaging results will be communicated to you by MyChart, letter or phone within 4 business days after the clinic has received the results. If you do not hear from us within 7 days, please contact the clinic through [employer] or phone. If you have a critical or abnormal lab result, we will notify you by phone as soon as possible.  Submit refill requests through [employer] or call your pharmacy and they will forward the refill request to us. Please allow 3 business days for your refill to be completed.          Additional Information About Your Visit        [employer] Information     [employer] gives you secure access to your electronic health record. If you see a primary care provider, you can also send messages to your care team and make appointments. If you have questions, please call your primary care clinic.  If you do not have a primary care provider, please call [phone] and they will assist you.        Care EveryWhere ID     This is your Care EveryWhere ID. This could be used by other organizations to access your [facility] medical records  [id number]        Your Vitals Were     Pulse Temperature Height Pulse Oximetry BMI (Body  "Mass Index)       101 97.5  F (36.4  C) (Oral) 5' 2\" (1.575 m) 99% 47.55 kg/m2        Blood Pressure from Last 3 Encounters:   05/01/17 104/80   04/24/17 118/81   02/10/16 121/78    Weight from Last 3 Encounters:   05/01/17 260 lb (117.9 kg)   04/24/17 259 lb 8 oz (117.7 kg)   02/10/16 239 lb 8 oz (108.6 kg)              We Performed the Following     Anti Treponema     CBC with platelets     CHLAMYDIA TRACHOMATIS PCR     Hepatitis B surface antigen     Hepatitis C antibody     HIV Antigen Antibody Combo     HPV High Risk Types DNA Cervical     NEISSERIA GONORRHOEA PCR     Pap imaged thin layer screen with HPV - recommended age 30 - 65 years (select HPV order below)     Vitamin D Deficiency          Today's Medication Changes          These changes are accurate as of: 5/1/17 10:21 AM.  If you have any questions, ask your nurse or doctor.               These medicines have changed or have updated prescriptions.        Dose/Directions    phentermine 15 MG capsule   This may have changed:  Another medication with the same name was removed. Continue taking this medication, and follow the directions you see here.   Used for:  Morbid obesity, unspecified obesity type (H)   Changed by:  Teri Ramirez PA-C        Dose:  15 mg   Take 1 capsule (15 mg) by mouth every morning   Quantity:  30 capsule   Refills:  1       topiramate 25 MG tablet   Commonly known as:  TOPAMAX   This may have changed:  Another medication with the same name was removed. Continue taking this medication, and follow the directions you see here.   Used for:  Morbid obesity, unspecified obesity type (H)   Changed by:  Teri Ramirez PA-C        25mg at bedtime for week 1, 50mg at bedtime for 1 week, and 75mg at bedtime thereafter   Quantity:  90 tablet   Refills:  1            Where to get your medicines      These medications were sent to [place] [id number] [place] [facility] [place] [place] - [address] [facility]  [address] [facility] [address]    " " Phone:  [phone]     sertraline 100 MG tablet                Primary Care Provider Office Phone # Fax #    Alejandro Jasiel Gifford [phone]       [facility] [address] [facility] [address]  [facility] [address]        Thank you!     Thank you for choosing [facility]  for your care. Our goal is always to provide you with excellent care. Hearing back from our patients is one way we can continue to improve our services. Please take a few minutes to complete the written survey that you may receive in the mail after your visit with us. Thank you!             Your Updated Medication List - Protect others around you: Learn how to safely use, store and throw away your medicines at www.disposemymeds.org.          This list is accurate as of: 5/1/17 10:21 AM.  Always use your most recent med list.                   Brand Name Dispense Instructions for use    cyanocobalamin 1000 MCG/ML injection    VITAMIN B12    1 mL    Inject 1 mL (1,000 mcg) Subcutaneous every 30 days       phentermine 15 MG capsule     30 capsule    Take 1 capsule (15 mg) by mouth every morning       sertraline 100 MG tablet    ZOLOFT    90 tablet    Take 1 tablet (100 mg) by mouth daily       topiramate 25 MG tablet    TOPAMAX    90 tablet    25mg at bedtime for week 1, 50mg at bedtime for 1 week, and 75mg at bedtime thereafter       Tuberculin-Allergy Syringes 25G X 5/8\" 1 ML [facility]    B-D TB SYRINGE    12 each    Use one every 30 days for cyanocobalamin (VITAMIN B12) injection.       VITAMIN D (CHOLECALCIFEROL) PO      Take by mouth daily         "

## 2017-05-01 NOTE — PROGRESS NOTES
Deisi is a 42 year old  female who presents for annual exam.     Menses are regular q 28-30 days and heavy lasting 7 days.  Menses flow: normal.  No LMP recorded.. Using none for contraception (doesn't have fallopian tubes).  She is not currently considering pregnancy.  Besides routine health maintenance,  she would like to discuss heavy periods.  Breast fed for almost one year and then got menses back.  Went to [facility] and iron was low.  Using pads and changing every couple hours on heavy days. (2-3 heavy days)  Also passing clots thumb size or bigger.  At [facility] had normal colonoscopy and upper endoscopy done. hgb 10.5 and 10.7 with low ferritin.  Discussed most likely cause of anemia is her menses.  Thyroid had been normal.  Daughter is now 2.4 y/o and starting  school. She is still seeing Dr. Dobson for counseling and that is going well. On zoloft 100 mg and will continue for now.  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  Went back to weight loss center and started back on medication for appetite as knows she needs to lose weight.   GYNECOLOGIC HISTORY:  Menarche:  Deisi is sexually active with 1male partner(s) and is currently in monogamous relationship.    History sexually transmitted infections:No STD history  STI testing offered?  yes  BOB exposure: Unknown  History of abnormal Pap smear: NO - age 30- 65 PAP every 3 years recommended  Family history of breast CA: No  Family history of uterine/ovarian CA: No    Family history of colon CA: No    HEALTH MAINTENANCE:  Cholesterol: (  Cholesterol   Date Value Ref Range Status   2015 181 <200 mg/dL Final     Comment:     LDL Cholesterol is the primary guide to therapy.   The NCEP recommends further evaluation of: patients with cholesterol greater   than 200 mg/dL if additional risk factors are present, cholesterol greater   than   240 mg/dL, triglycerides greater than 150 mg/dL, or HDL less than 40 mg/dL.      2014 152 <200 mg/dL Final     Comment:     LDL Cholesterol is the primary guide to therapy.   The NCEP recommends further evaluation of: patients with cholesterol greater   than 200 mg/dL if additional risk factors are present, cholesterol greater   than   240 mg/dL, triglycerides greater than 150 mg/dL, or HDL less than 40 mg/dL.      History of abnormal lipids: Yes  Mammo:2008 . History of abnormal Mammo: No.  Regular Self Breast Exams: Yes  Calcium/Vitamin D intake: source:  dairy, dietary supplement(s) Adequate? Yes  TSH: (  TSH   Date Value Ref Range Status   2014 0.69 0.40 - 4.00 mU/L Final     Comment:     Effective 2014, the reference range for this assay has changed to reflect   new instrumentation/methodology.      )  Pap; (  Lab Results   Component Value Date    PAP NIL 2012    PAP NIL 2009    PAP NIL 2008    )    HISTORY:  Obstetric History       T0      TAB0   SAB0   E0   M1   L1       # Outcome Date GA Lbr Artur/2nd Weight Sex Delivery Anes PTL Lv   1A  10/18/14 34w1d   M CS-LTranv Spinal  ND      Name: Altaf      Apgar1:  6                Apgar5: 7   1B  10/18/14 34w1d   F CS-LTranv Spinal  Y      Name: Sera      Apgar1:  8                Apgar5: 9        Past Medical History:   Diagnosis Date     Arthritis      Cancer (H)      Depression     anxiety, sees therapist     Obesity      Past Surgical History:   Procedure Laterality Date      SECTION N/A 10/18/2014    Procedure:  SECTION;  Surgeon: Edilma Zayas MD;  Location:  L+D     COLONOSCOPY       GYN SURGERY  2013    Fallopian Tubes Removed     HC AMNIOCENTESIS DIAGNOSTIC  2014     HC AMNIOCENTESIS DIAGNOSTIC  2014     hsg  11    blockage of both tubes     LAPAROSCOPIC GASTRIC SLEEVE  2012    Procedure: LAPAROSCOPIC GASTRIC SLEEVE;  Laparoscopic Sleeve Gastrectomy and Hiatal Hernia Repair;  Surgeon: Eze Muhammad MD;  Location:  "[facility] OR     LAPAROSCOPIC SALPINGECTOMY  5/7/2013    Procedure: LAPAROSCOPIC SALPINGECTOMY;  Operative Laparoscopy, lysis of adhesions, Bilateral Salpingectomies ;  Surgeon: Abdelrahman Corcoran MD;  Location:  OR     U/S GUIDANCE FOR TVOR (CLINIC ONLY)  10/2013     Family History   Problem Relation Age of Onset     DIABETES Maternal Grandmother      Hypertension Maternal Grandmother      MENTAL ILLNESS Maternal Grandmother      CEREBROVASCULAR DISEASE Maternal Grandmother      Allergies Father      Hypertension Father      Prostate Cancer Father      MENTAL ILLNESS Father      Obesity Father      Anxiety Disorder Mother      MENTAL ILLNESS Mother      Respiratory Brother      Obesity Brother      Social History     Social History     Marital status:      Spouse name: N/A     Number of children: 1     Years of education: 18     Occupational History     principal [employer]     Social History Main Topics     Smoking status: Never Smoker     Smokeless tobacco: Never Used     Alcohol use No      Comment: rarely     Drug use: No     Sexual activity: Yes     Partners: Male     Birth control/ protection: None      Comment: no fallopian tubes     Other Topics Concern     Parent/Sibling W/ Cabg, Mi Or Angioplasty Before 65f 55m? No     Social History Narrative       Current Outpatient Prescriptions:      phentermine 15 MG capsule, Take 1 capsule (15 mg) by mouth every morning, Disp: 30 capsule, Rfl: 1     topiramate (TOPAMAX) 25 MG tablet, 25mg at bedtime for week 1, 50mg at bedtime for 1 week, and 75mg at bedtime thereafter, Disp: 90 tablet, Rfl: 1     sertraline (ZOLOFT) 100 MG tablet, Take 1 tablet (100 mg) by mouth daily, Disp: 90 tablet, Rfl: 0     Tuberculin-Allergy Syringes (B-D TB SYRINGE) 25G X 5/8\" 1 ML MISC, Use one every 30 days for cyanocobalamin (VITAMIN B12) injection., Disp: 12 each, Rfl: 0     cyanocobalamin (VITAMIN B12) 1000 MCG/ML injection, Inject 1 mL " "(1,000 mcg) Subcutaneous every 30 days, Disp: 1 mL, Rfl: 11     [DISCONTINUED] topiramate (TOPAMAX) 25 MG tablet, 25 mg at bedtime for 1 week, 50 mg at bedtime for 1 week and 75 mg daily at bedtime thereafter (Patient not taking: Reported on 4/24/2017), Disp: 90 tablet, Rfl: 2     VITAMIN D, CHOLECALCIFEROL, PO, Take by mouth daily, Disp: , Rfl:      Allergies   Allergen Reactions     No Known Drug Allergies        Past medical, surgical, social and family history were reviewed and updated in EPIC.    EXAM:  /80  Pulse 101  Temp 97.5  F (36.4  C) (Oral)  Ht 5' 2\" (1.575 m)  Wt 260 lb (117.9 kg)  SpO2 99%  BMI 47.55 kg/m2   BMI: Body mass index is 47.55 kg/(m^2).  Constitutional: healthy, alert and no distress  Head: Normocephalic. No masses, lesions, tenderness or abnormalities  Neck: Neck supple. Trachea midline. No adenopathy. Thyroid symmetric, normal size.   Cardiovascular: RRR.   Respiratory: Negative.   Breast: No nodularity, asymmetry or nipple discharge bilaterally.  Gastrointestinal: Abdomen soft, non-tender, non-distended. No masses, organomegaly.  :  Vulva:  No external lesions, normal female hair distribution, no inguinal adenopathy.    Urethra:  Midline, non-tender, well supported, no discharge  Vagina:  Moist, pink, no abnormal discharge, no lesions **large coated graham used to see cervix and still this was difficult  Did not attempt bimanual exam due to BMI, u/s ordered.    Rectal Exam: deferred  Musculoskeletal: extremities normal  Skin: no suspicious lesions or rashes  Psychiatric: Affect appropriate, cooperative,mentation appears normal.     COUNSELING:   Reviewed preventive health counseling, as reflected in patient instructions       Regular exercise       Healthy diet/nutrition   reports that she has never smoked. She has never used smokeless tobacco.    Body mass index is 47.55 kg/(m^2).  Weight management plashe is going to wt loss center alreadyshe is going to wt loss center " already  FRAX Risk Assessment    ASSESSMENT:  42 year old female with satisfactory annual exam  (Z01.419) Encounter for gynecological examination without abnormal finding  (primary encounter diagnosis)  Comment: no fallopian tubes  Plan: Vitamin D Deficiency, Pap imaged thin layer         screen with HPV - recommended age 30 - 65 years        (select HPV order below), HPV High Risk Types         DNA Cervical, CBC with platelets        Discussed sending pap smear for HPV typing as well and that if both pap and HPV are negative, then can have q5 year pap smears.    Check vit D as been low in the past    (Z11.3) Screening examination for venereal disease  Comment: pt request today  Plan: Hepatitis B surface antigen, Anti Treponema,         HIV Antigen Antibody Combo, NEISSERIA         GONORRHOEA PCR, CHLAMYDIA TRACHOMATIS PCR,         Hepatitis C antibody        Check labs and let her know results    (F41.1) DENISE (generalized anxiety disorder)  Comment: stable  Plan: sertraline (ZOLOFT) 100 MG tablet        Refill done. Discussed maybe wean off next year spring if doing well still    (N92.1) Menorrhagia with irregular cycle  Comment: anemia and low ferritin  Plan: US Pelvic Complete with Transvaginal        Check u/s and if normal, can try Mirena IUD.  Was given info today and discussed.  Will be difficult to place with her BMI.        Samantha Sultana MD

## 2017-05-01 NOTE — NURSING NOTE
"Chief Complaint   Patient presents with     Physical     pap NIL 2012       Initial /80  Pulse 101  Temp 97.5  F (36.4  C) (Oral)  Ht 5' 2\" (1.575 m)  Wt 260 lb (117.9 kg)  SpO2 99%  BMI 47.55 kg/m2 Estimated body mass index is 47.55 kg/(m^2) as calculated from the following:    Height as of this encounter: 5' 2\" (1.575 m).    Weight as of this encounter: 260 lb (117.9 kg).  BP completed using cuff size: large        The following HM Due: mammo      The following patient reported/Care Every where data was sent to:  P ABSTRACT QUALITY INITIATIVES [[id number]]  none     patient has appointment for today             "

## 2017-05-01 NOTE — PATIENT INSTRUCTIONS
mirena IUD website (an option for heavy menses)    Call [phone] to schedule ultrasound for check uterus and ovaries.  Listen to prompts for the surgery and ultrasound .

## 2017-05-02 LAB
C TRACH DNA SPEC QL NAA+PROBE: NORMAL
N GONORRHOEA DNA SPEC QL NAA+PROBE: NORMAL
SPECIMEN SOURCE: NORMAL
SPECIMEN SOURCE: NORMAL
T PALLIDUM IGG+IGM SER QL: NEGATIVE

## 2017-05-02 ASSESSMENT — PATIENT HEALTH QUESTIONNAIRE - PHQ9: SUM OF ALL RESPONSES TO PHQ QUESTIONS 1-9: 8

## 2017-05-03 LAB
COPATH REPORT: NORMAL
PAP: NORMAL

## 2017-05-04 LAB
FINAL DIAGNOSIS: ABNORMAL
HPV HR 12 DNA CVX QL NAA+PROBE: POSITIVE
HPV16 DNA SPEC QL NAA+PROBE: NEGATIVE
HPV18 DNA SPEC QL NAA+PROBE: NEGATIVE
SPECIMEN DESCRIPTION: ABNORMAL

## 2017-05-16 ENCOUNTER — OFFICE VISIT (OUTPATIENT)
Dept: FAMILY MEDICINE | Facility: CLINIC | Age: 42
End: 2017-05-16
Payer: COMMERCIAL

## 2017-05-16 VITALS
WEIGHT: 258 LBS | HEART RATE: 85 BPM | SYSTOLIC BLOOD PRESSURE: 112 MMHG | OXYGEN SATURATION: 98 % | TEMPERATURE: 96.6 F | RESPIRATION RATE: 18 BRPM | BODY MASS INDEX: 47.19 KG/M2 | DIASTOLIC BLOOD PRESSURE: 79 MMHG

## 2017-05-16 DIAGNOSIS — J01.00 ACUTE MAXILLARY SINUSITIS, RECURRENCE NOT SPECIFIED: ICD-10-CM

## 2017-05-16 DIAGNOSIS — H10.31 ACUTE BACTERIAL CONJUNCTIVITIS OF RIGHT EYE: Primary | ICD-10-CM

## 2017-05-16 PROCEDURE — 99213 OFFICE O/P EST LOW 20 MIN: CPT | Performed by: NURSE PRACTITIONER

## 2017-05-16 RX ORDER — POLYMYXIN B SULFATE AND TRIMETHOPRIM 1; 10000 MG/ML; [USP'U]/ML
1 SOLUTION OPHTHALMIC EVERY 4 HOURS
Qty: 1 BOTTLE | Refills: 1 | Status: SHIPPED | OUTPATIENT
Start: 2017-05-16 | End: 2017-05-23

## 2017-05-16 NOTE — PATIENT INSTRUCTIONS
Conjunctivitis, Bacterial  You have an infection in the membranes covering the white part of the eye. This part of the eye is called the conjunctiva. The infection is called conjunctivitis. The most common symptoms of conjunctivitis include a thick, pus-like discharge from the eye, swollen eyelids, redness, eyelids sticking together upon awakening, and a gritty or scratchy feeling in the eye. Your infection was caused by bacteria. It may be treated with medicine. With treatment, the infection takes about 7 to 10 days to resolve.    Home care    Use prescribed antibiotic eye drops or ointment as directed to treat the infection.    Apply a warm compress (towel soaked in warm water) to the affected eye 3 to 4 times a day. Do this just before applying medicine to the eye.    Use a warm, wet cloth to wipe away crusting of the eyelids in the morning. This is caused by mucus drainage during the night. You may also use saline irrigating solution or artificial tears to rinse away mucus in the eye. Do not put a patch over the eye.    Wash your hands before and after touching the infected eye. This is to prevent spreading the infection to the other eye, and to other people. Do not share your towels or washcloths with others.    You may use acetaminophen or ibuprofen to control pain, unless another medicine was prescribed. (Note: If you have chronic liver or kidney disease or have ever had a stomach ulcer or gastrointestinal bleeding, talk with your doctor before using these medicines.)    Do not wear contact lenses until your eyes have healed and all symptoms are gone.  Follow-up care  Follow up with your healthcare provider, or as advised.  When to seek medical advice  Call your healthcare provider right away if any of these occur:    Worsening vision    Increasing pain in the eye    Increasing swelling or redness of the eyelid    Redness spreading around the eye    4075-7832 [employer]. [address], [place] [address]. All rights reserved. This information is not intended as a substitute for professional medical care. Always follow your healthcare professional's instructions.      Sinusitis (No Antibiotics)    The sinuses are air-filled spaces within the bones of the face. They connect to the inside of the nose. Sinusitis is an inflammation of the tissue lining the sinus cavity. Sinus inflammation can occur during a cold. It can also be due to allergies to pollens and other particles in the air. It can cause symptoms such as sinus congestion, headache, sore throat, facial swelling and fullness. It may also cause a low-grade fever. No infection is present, and no antibiotic treatment is needed.  Home care    Drink plenty of water, hot tea, and other liquids. This may help thin mucus. It also may promote sinus drainage.    Heat may help soothe painful areas of the face. Use a towel soaked in hot water. Or,  the shower and direct the hot spray onto your face. Using a vaporizer along with a menthol rub at night may also help.     An expectorant containing guaifenesin may help thin the mucus and promote drainage from the sinuses.    Over-the-counter decongestants may be used unless a similar medicine was prescribed. Nasal sprays work the fastest. Use one that contains phenylephrine or oxymetazoline. First blow the nose gently. Then use the spray. Do not use these medicines more often than directed on the label or symptoms may get worse. You may also use tablets containing pseudoephedrine. Avoid products that combine ingredients, because side effects may be increased. Read labels. You can also ask the pharmacist for help. (NOTE: Persons with high blood pressure should not use decongestants. They can raise blood pressure.)    Over-the-counter antihistamines may help if allergies contributed to your sinusitis.      Use acetaminophen or ibuprofen to control pain, unless another pain medicine was prescribed.  (If you have chronic liver or kidney disease or ever had a stomach ulcer, talk with your doctor before using these medicines. Aspirin should never be used in anyone under 18 years of age who is ill with a fever. It may cause severe liver damage.)    Use nasal rinses or irrigation as instructed by your health care provider.    Don't smoke. This can worsen symptoms.  Follow-up care  Follow up with your healthcare provider or our staff if you are not improving within the next week.  When to seek medical advice  Call your healthcare provider if any of these occur:    Green or yellow discharge from the nose or into the throat    Facial pain or headache becoming more severe    Stiff neck    Unusual drowsiness or confusion    Swelling of the forehead or eyelids    Vision problems, including blurred or double vision    Fever of 100.4 F (38 C) or higher, or as directed by your healthcare provider    Seizure    Breathing problems    Symptoms not resolving within 10 days    1433-2155 [employer]. [address] [place] [address], [place] [address]. All rights reserved. This information is not intended as a substitute for professional medical care. Always follow your healthcare professional's instructions.

## 2017-05-16 NOTE — NURSING NOTE
"Chief Complaint   Patient presents with     Eye Problem       Initial /79 (BP Location: Right arm, Patient Position: Chair, Cuff Size: Adult Large)  Pulse 85  Temp 96.6  F (35.9  C) (Tympanic)  Resp 18  Wt 258 lb (117 kg)  LMP 04/28/2017 (Approximate)  SpO2 98%  BMI 47.19 kg/m2 Estimated body mass index is 47.19 kg/(m^2) as calculated from the following:    Height as of 5/1/17: 5' 2\" (1.575 m).    Weight as of this encounter: 258 lb (117 kg).  Medication Reconciliation: complete       Justina Vyas MA      "

## 2017-05-16 NOTE — PROGRESS NOTES
SUBJECTIVE:                                                    Deisi Miner is a 42 year old female who presents to clinic today for the following health issues:    Eye(s) Problem    Duration: x1 day     Description:  Location: right  Pain: no  Redness: YES- pink   Discharge: YES, crusty, crusted shut this am.    Accompanying signs and symptoms: pt thinks she have a sinus infection with URI congestion for >10 days with sinus pain.     History (Trauma, foreign body exposure,): pt's daughter have pink eye/goes to day care    Precipitating or alleviating factors (contact use): None    Therapies tried and outcome: None       Problem list and histories reviewed & adjusted, as indicated.  Additional history: as documented    Patient Active Problem List   Diagnosis     Obesity     CARDIOVASCULAR SCREENING; LDL GOAL LESS THAN 160     Bariatric surgery status     Cervical high risk HPV (human papillomavirus) test positive     Past Surgical History:   Procedure Laterality Date      SECTION N/A 10/18/2014    Procedure:  SECTION;  Surgeon: Edilma Zayas MD;  Location: [facility] L[facility]D     COLONOSCOPY       GYN SURGERY  2013    Fallopian Tubes Removed     HC AMNIOCENTESIS DIAGNOSTIC  2014     HC AMNIOCENTESIS DIAGNOSTIC  2014     hsg  12    blockage of both tubes     LAPAROSCOPIC GASTRIC SLEEVE  2012    Procedure: LAPAROSCOPIC GASTRIC SLEEVE;  Laparoscopic Sleeve Gastrectomy and Hiatal Hernia Repair;  Surgeon: Eze Muhammad MD;  Location: [facility]     LAPAROSCOPIC SALPINGECTOMY  2013    Procedure: LAPAROSCOPIC SALPINGECTOMY;  Operative Laparoscopy, lysis of adhesions, Bilateral Salpingectomies ;  Surgeon: Abdelrahman Corcoran MD;  Location: [facility] OR     U/S GUIDANCE FOR TVOR (CLINIC ONLY)  10/2013       Social History   Substance Use Topics     Smoking status: Never Smoker     Smokeless tobacco: Never Used     Alcohol use No      Comment: rarely     Family History   Problem Relation  "Age of Onset     DIABETES Maternal Grandmother      Hypertension Maternal Grandmother      MENTAL ILLNESS Maternal Grandmother      CEREBROVASCULAR DISEASE Maternal Grandmother      Allergies Father      Hypertension Father      Prostate Cancer Father      MENTAL ILLNESS Father      Obesity Father      Anxiety Disorder Mother      MENTAL ILLNESS Mother      Respiratory Brother      Obesity Brother          Current Outpatient Prescriptions   Medication Sig Dispense Refill     sertraline (ZOLOFT) 100 MG tablet Take 1 tablet (100 mg) by mouth daily 90 tablet 3     phentermine 15 MG capsule Take 1 capsule (15 mg) by mouth every morning 30 capsule 1     topiramate (TOPAMAX) 25 MG tablet 25mg at bedtime for week 1, 50mg at bedtime for 1 week, and 75mg at bedtime thereafter 90 tablet 1     Tuberculin-Allergy Syringes (B-D TB SYRINGE) 25G X 5/8\" 1 ML MISC Use one every 30 days for cyanocobalamin (VITAMIN B12) injection. 12 each 0     cyanocobalamin (VITAMIN B12) 1000 MCG/ML injection Inject 1 mL (1,000 mcg) Subcutaneous every 30 days 1 mL 11     VITAMIN D, CHOLECALCIFEROL, PO Take by mouth daily       Allergies   Allergen Reactions     No Known Drug Allergies      Recent Labs   Lab Test  07/31/15   0730  11/07/14   0950  08/15/14   1024  06/16/14   0826  07/01/13   1028   05/17/11   1349   A1C  5.5   --   5.3  5.2  4.9   < >   --    LDL  98   --    --   64  111   < >   --    HDL  70   --    --   71  55   < >   --    TRIG  63   --    --   88  62   < >   --    ALT  20   --    --   17  12   < >   --    CR  0.80   --    --   0.63  0.76   < >   --    GFRESTIMATED  80   --    --   >90  86   < >   --    GFRESTBLACK  >90   GFR Calc     --    --   >90  >90   < >   --    POTASSIUM  3.9   --    --   3.8  3.7   < >   --    TSH   --   0.69   --    --    --    --   1.17    < > = values in this interval not displayed.      BP Readings from Last 3 Encounters:   05/16/17 112/79   05/01/17 104/80   04/24/17 118/81    Wt " Readings from Last 3 Encounters:   05/16/17 258 lb (117 kg)   05/01/17 260 lb (117.9 kg)   04/24/17 259 lb 8 oz (117.7 kg)                  Labs reviewed in EPIC    Reviewed and updated as needed this visit by clinical staff       Reviewed and updated as needed this visit by Provider         ROS:  Constitutional, HEENT, cardiovascular, pulmonary, gi and gu systems are negative, except as otherwise noted.    OBJECTIVE:                                                    /79 (BP Location: Right arm, Patient Position: Chair, Cuff Size: Adult Large)  Pulse 85  Temp 96.6  F (35.9  C) (Tympanic)  Resp 18  Wt 258 lb (117 kg)  LMP 04/28/2017 (Approximate)  SpO2 98%  BMI 47.19 kg/m2  Body mass index is 47.19 kg/(m^2).  EXAM:  Constitutional: healthy, alert, active and no distress  Neck: Neck supple. No adenopathy.  Eyes: NYLA.   left eye clear.  Right eye conjunctival injection consistent with conjunctivitis.  ENT: Bilateral TM's are normal.  Posterior oropharynx is clear.  Nares congested.  Sinuses: + pain with palpation  Cardiovascular: S1, S2  Respiratory: Respirations easy and regular. No respiratory distress. Lungs sounds CTA.  Skin: warm and dry  Psychiatric: mentation appears normal. and affect normal/bright       ASSESSMENT/PLAN:                                                    (H10.31) Acute bacterial conjunctivitis of right eye  (primary encounter diagnosis)  Comment: acute  Plan: trimethoprim-polymyxin b (POLYTRIM) ophthalmic         solution        Use the eye drops as prescribed.  Frequent handwashing.  You are considered not contagious after 24 hours of antibiotics.  Anticipate that this will clear without difficulty.  Return if any new or additional problems.      (J01.00) Acute maxillary sinusitis, recurrence not specified  Comment: acute  Plan: amoxicillin-clavulanate (AUGMENTIN) 875-125 MG         per tablet        Take antibiotic as prescribed. Symptomatic management (push fluids, good  nutrition, MVI if indicated, OTC decongestants, etc). Return prn any problems, questions, or concerns.    Patient Instructions     Conjunctivitis, Bacterial  You have an infection in the membranes covering the white part of the eye. This part of the eye is called the conjunctiva. The infection is called conjunctivitis. The most common symptoms of conjunctivitis include a thick, pus-like discharge from the eye, swollen eyelids, redness, eyelids sticking together upon awakening, and a gritty or scratchy feeling in the eye. Your infection was caused by bacteria. It may be treated with medicine. With treatment, the infection takes about 7 to 10 days to resolve.    Home care    Use prescribed antibiotic eye drops or ointment as directed to treat the infection.    Apply a warm compress (towel soaked in warm water) to the affected eye 3 to 4 times a day. Do this just before applying medicine to the eye.    Use a warm, wet cloth to wipe away crusting of the eyelids in the morning. This is caused by mucus drainage during the night. You may also use saline irrigating solution or artificial tears to rinse away mucus in the eye. Do not put a patch over the eye.    Wash your hands before and after touching the infected eye. This is to prevent spreading the infection to the other eye, and to other people. Do not share your towels or washcloths with others.    You may use acetaminophen or ibuprofen to control pain, unless another medicine was prescribed. (Note: If you have chronic liver or kidney disease or have ever had a stomach ulcer or gastrointestinal bleeding, talk with your doctor before using these medicines.)    Do not wear contact lenses until your eyes have healed and all symptoms are gone.  Follow-up care  Follow up with your healthcare provider, or as advised.  When to seek medical advice  Call your healthcare provider right away if any of these occur:    Worsening vision    Increasing pain in the eye    Increasing  swelling or redness of the eyelid    Redness spreading around the eye    [id number] [employer]. [address] [place] [address], [place] [address]. All rights reserved. This information is not intended as a substitute for professional medical care. Always follow your healthcare professional's instructions.      Sinusitis (No Antibiotics)    The sinuses are air-filled spaces within the bones of the face. They connect to the inside of the nose. Sinusitis is an inflammation of the tissue lining the sinus cavity. Sinus inflammation can occur during a cold. It can also be due to allergies to pollens and other particles in the air. It can cause symptoms such as sinus congestion, headache, sore throat, facial swelling and fullness. It may also cause a low-grade fever. No infection is present, and no antibiotic treatment is needed.  Home care    Drink plenty of water, hot tea, and other liquids. This may help thin mucus. It also may promote sinus drainage.    Heat may help soothe painful areas of the face. Use a towel soaked in hot water. Or,  the shower and direct the hot spray onto your face. Using a vaporizer along with a menthol rub at night may also help.     An expectorant containing guaifenesin may help thin the mucus and promote drainage from the sinuses.    Over-the-counter decongestants may be used unless a similar medicine was prescribed. Nasal sprays work the fastest. Use one that contains phenylephrine or oxymetazoline. First blow the nose gently. Then use the spray. Do not use these medicines more often than directed on the label or symptoms may get worse. You may also use tablets containing pseudoephedrine. Avoid products that combine ingredients, because side effects may be increased. Read labels. You can also ask the pharmacist for help. (NOTE: Persons with high blood pressure should not use decongestants. They can raise blood pressure.)    Over-the-counter antihistamines may help if allergies  contributed to your sinusitis.      Use acetaminophen or ibuprofen to control pain, unless another pain medicine was prescribed. (If you have chronic liver or kidney disease or ever had a stomach ulcer, talk with your doctor before using these medicines. Aspirin should never be used in anyone under 18 years of age who is ill with a fever. It may cause severe liver damage.)    Use nasal rinses or irrigation as instructed by your health care provider.    Don't smoke. This can worsen symptoms.  Follow-up care  Follow up with your healthcare provider or our staff if you are not improving within the next week.  When to seek medical advice  Call your healthcare provider if any of these occur:    Green or yellow discharge from the nose or into the throat    Facial pain or headache becoming more severe    Stiff neck    Unusual drowsiness or confusion    Swelling of the forehead or eyelids    Vision problems, including blurred or double vision    Fever of 100.4 F (38 C) or higher, or as directed by your healthcare provider    Seizure    Breathing problems    Symptoms not resolving within 10 days    [id number] [employer]. [address] [place] [address], [place] [address]. All rights reserved. This information is not intended as a substitute for professional medical care. Always follow your healthcare professional's instructions.                DARIO Love [facility]

## 2017-05-16 NOTE — MR AVS SNAPSHOT
After Visit Summary   5/16/2017    Deisi Miner    MRN: [id number]           Patient Information     Date Of Birth          1975        Visit Information        Provider Department      5/16/2017 10:00 AM Samantha Menjivar APRN CNP [facility]        Today's Diagnoses     Acute bacterial conjunctivitis of right eye    -  1    Acute maxillary sinusitis, recurrence not specified          Care Instructions      Conjunctivitis, Bacterial  You have an infection in the membranes covering the white part of the eye. This part of the eye is called the conjunctiva. The infection is called conjunctivitis. The most common symptoms of conjunctivitis include a thick, pus-like discharge from the eye, swollen eyelids, redness, eyelids sticking together upon awakening, and a gritty or scratchy feeling in the eye. Your infection was caused by bacteria. It may be treated with medicine. With treatment, the infection takes about 7 to 10 days to resolve.    Home care    Use prescribed antibiotic eye drops or ointment as directed to treat the infection.    Apply a warm compress (towel soaked in warm water) to the affected eye 3 to 4 times a day. Do this just before applying medicine to the eye.    Use a warm, wet cloth to wipe away crusting of the eyelids in the morning. This is caused by mucus drainage during the night. You may also use saline irrigating solution or artificial tears to rinse away mucus in the eye. Do not put a patch over the eye.    Wash your hands before and after touching the infected eye. This is to prevent spreading the infection to the other eye, and to other people. Do not share your towels or washcloths with others.    You may use acetaminophen or ibuprofen to control pain, unless another medicine was prescribed. (Note: If you have chronic liver or kidney disease or have ever had a stomach ulcer or gastrointestinal bleeding, talk with your doctor before using these  medicines.)    Do not wear contact lenses until your eyes have healed and all symptoms are gone.  Follow-up care  Follow up with your healthcare provider, or as advised.  When to seek medical advice  Call your healthcare provider right away if any of these occur:    Worsening vision    Increasing pain in the eye    Increasing swelling or redness of the eyelid    Redness spreading around the eye    [id number] [employer]. [address] [place] [address]. All rights reserved. This information is not intended as a substitute for professional medical care. Always follow your healthcare professional's instructions.      Sinusitis (No Antibiotics)    The sinuses are air-filled spaces within the bones of the face. They connect to the inside of the nose. Sinusitis is an inflammation of the tissue lining the sinus cavity. Sinus inflammation can occur during a cold. It can also be due to allergies to pollens and other particles in the air. It can cause symptoms such as sinus congestion, headache, sore throat, facial swelling and fullness. It may also cause a low-grade fever. No infection is present, and no antibiotic treatment is needed.  Home care    Drink plenty of water, hot tea, and other liquids. This may help thin mucus. It also may promote sinus drainage.    Heat may help soothe painful areas of the face. Use a towel soaked in hot water. Or,  the shower and direct the hot spray onto your face. Using a vaporizer along with a menthol rub at night may also help.     An expectorant containing guaifenesin may help thin the mucus and promote drainage from the sinuses.    Over-the-counter decongestants may be used unless a similar medicine was prescribed. Nasal sprays work the fastest. Use one that contains phenylephrine or oxymetazoline. First blow the nose gently. Then use the spray. Do not use these medicines more often than directed on the label or symptoms may get worse. You may also use  tablets containing pseudoephedrine. Avoid products that combine ingredients, because side effects may be increased. Read labels. You can also ask the pharmacist for help. (NOTE: Persons with high blood pressure should not use decongestants. They can raise blood pressure.)    Over-the-counter antihistamines may help if allergies contributed to your sinusitis.      Use acetaminophen or ibuprofen to control pain, unless another pain medicine was prescribed. (If you have chronic liver or kidney disease or ever had a stomach ulcer, talk with your doctor before using these medicines. Aspirin should never be used in anyone under 18 years of age who is ill with a fever. It may cause severe liver damage.)    Use nasal rinses or irrigation as instructed by your health care provider.    Don't smoke. This can worsen symptoms.  Follow-up care  Follow up with your healthcare provider or our staff if you are not improving within the next week.  When to seek medical advice  Call your healthcare provider if any of these occur:    Green or yellow discharge from the nose or into the throat    Facial pain or headache becoming more severe    Stiff neck    Unusual drowsiness or confusion    Swelling of the forehead or eyelids    Vision problems, including blurred or double vision    Fever of 100.4 F (38 C) or higher, or as directed by your healthcare provider    Seizure    Breathing problems    Symptoms not resolving within 10 days    [id number] [employer]. [address] [place] [address]. All rights reserved. This information is not intended as a substitute for professional medical care. Always follow your healthcare professional's instructions.                Follow-ups after your visit        Your next 10 appointments already scheduled     May 22, 2017  7:45 AM CDT   (Arrive by 7:30 AM)   Return Weight Management Visit with Teri Ramirez PA-C   [facility] ([facility])    [address]  4th Floor  [facility] [address]   [phone]            Jun 06, 2017  2:00 PM CDT   Return Visit with Marcela Dobson, PhD MARJORIE   [facility]  ([facility])    [facility] [address]  [address]  [address]  [facility] [address]   [phone]            Jun 20, 2017  3:00 PM CDT   Return Visit with Marcela Dobson, PhD MARJORIE   [facility]  ([facility])    [facility]  [address]  [address]  [facility] [address]   [phone]            Jul 11, 2017  3:00 PM CDT   Return Visit with Marcela Dobson, PhD MARJORIE   [facility]  ([facility])    [facility]  [address]  [address]  [facility] [address]   [phone]            Jul 25, 2017  3:00 PM CDT   Return Visit with Marcela Dobson, PhD MARJORIE   [facility]  ([facility])    [facility]  [address]  [address]  [facility] [address]   [phone]              Who to contact     If you have questions or need follow up information about today's clinic visit or your schedule please contact [facility] directly at [phone].  Normal or non-critical lab and imaging results will be communicated to you by [employer], letter or phone within 4 business days after the clinic has received the results. If you do not hear from us within 7 days, please contact the clinic through [employer] or phone. If you have a critical or abnormal lab result, we will notify you by phone as soon as possible.  Submit refill requests through [employer] or call your pharmacy and they will forward the refill request to us. Please allow 3 business days for your refill to be completed.          Additional Information About Your Visit        [employer] Information     [employer] gives you secure access to your  electronic health record. If you see a primary care provider, you can also send messages to your care team and make appointments. If you have questions, please call your primary care clinic.  If you do not have a primary care provider, please call [phone] and they will assist you.        Care EveryWhere ID     This is your Care EveryWhere ID. This could be used by other organizations to access your [facility] medical records  [id number]        Your Vitals Were     Pulse Temperature Respirations Last Period Pulse Oximetry BMI (Body Mass Index)    85 96.6  F (35.9  C) (Tympanic) 18 04/28/2017 (Approximate) 98% 47.19 kg/m2       Blood Pressure from Last 3 Encounters:   05/16/17 112/79   05/01/17 104/80   04/24/17 118/81    Weight from Last 3 Encounters:   05/16/17 258 lb (117 kg)   05/01/17 260 lb (117.9 kg)   04/24/17 259 lb 8 oz (117.7 kg)              Today, you had the following     No orders found for display         Today's Medication Changes          These changes are accurate as of: 5/16/17 11:26 AM.  If you have any questions, ask your nurse or doctor.               Start taking these medicines.        Dose/Directions    amoxicillin-clavulanate 875-125 MG per tablet   Commonly known as:  AUGMENTIN   Used for:  Acute maxillary sinusitis, recurrence not specified   Started by:  Samantha Menjivar APRN CNP        Dose:  1 tablet   Take 1 tablet by mouth 2 times daily   Quantity:  20 tablet   Refills:  0       trimethoprim-polymyxin b ophthalmic solution   Commonly known as:  POLYTRIM   Used for:  Acute bacterial conjunctivitis of right eye   Started by:  Samantha Menjivar APRN CNP        Dose:  1 drop   Place 1 drop into the right eye every 4 hours for 7 days   Quantity:  1 Bottle   Refills:  1            Where to get your medicines      These medications were sent to [facility] - [place], [place] - [address]  [address], [place] [address]     Phone:  [phone]  "    amoxicillin-clavulanate 875-125 MG per tablet    trimethoprim-polymyxin b ophthalmic solution                Primary Care Provider Office Phone # Fax #    Alejandro Jasiel Gifford [phone]       [facility] [address] [facility] [address]  [facility] [address]        Thank you!     Thank you for choosing [facility]  for your care. Our goal is always to provide you with excellent care. Hearing back from our patients is one way we can continue to improve our services. Please take a few minutes to complete the written survey that you may receive in the mail after your visit with us. Thank you!             Your Updated Medication List - Protect others around you: Learn how to safely use, store and throw away your medicines at www.disposemymeds.org.          This list is accurate as of: 5/16/17 11:26 AM.  Always use your most recent med list.                   Brand Name Dispense Instructions for use    amoxicillin-clavulanate 875-125 MG per tablet    AUGMENTIN    20 tablet    Take 1 tablet by mouth 2 times daily       cyanocobalamin 1000 MCG/ML injection    VITAMIN B12    1 mL    Inject 1 mL (1,000 mcg) Subcutaneous every 30 days       phentermine 15 MG capsule     30 capsule    Take 1 capsule (15 mg) by mouth every morning       sertraline 100 MG tablet    ZOLOFT    90 tablet    Take 1 tablet (100 mg) by mouth daily       topiramate 25 MG tablet    TOPAMAX    90 tablet    25mg at bedtime for week 1, 50mg at bedtime for 1 week, and 75mg at bedtime thereafter       trimethoprim-polymyxin b ophthalmic solution    POLYTRIM    1 Bottle    Place 1 drop into the right eye every 4 hours for 7 days       Tuberculin-Allergy Syringes 25G X 5/8\" 1 ML [facility]    B-D TB SYRINGE    12 each    Use one every 30 days for cyanocobalamin (VITAMIN B12) injection.       VITAMIN D (CHOLECALCIFEROL) PO      Take by mouth daily         "

## 2017-05-22 ENCOUNTER — OFFICE VISIT (OUTPATIENT)
Dept: ENDOCRINOLOGY | Facility: CLINIC | Age: 42
End: 2017-05-22

## 2017-05-22 VITALS
HEIGHT: 62 IN | BODY MASS INDEX: 47.33 KG/M2 | HEART RATE: 95 BPM | WEIGHT: 257.2 LBS | SYSTOLIC BLOOD PRESSURE: 121 MMHG | OXYGEN SATURATION: 98 % | DIASTOLIC BLOOD PRESSURE: 84 MMHG

## 2017-05-22 DIAGNOSIS — E66.01 MORBID OBESITY, UNSPECIFIED OBESITY TYPE (H): Primary | ICD-10-CM

## 2017-05-22 RX ORDER — PHENTERMINE HYDROCHLORIDE 37.5 MG/1
TABLET ORAL
Qty: 30 TABLET | Refills: 3 | Status: SHIPPED | OUTPATIENT
Start: 2017-05-22 | End: 2018-12-03

## 2017-05-22 RX ORDER — PHENTERMINE HYDROCHLORIDE 37.5 MG/1
TABLET ORAL
Qty: 15 TABLET | Refills: 3 | Status: SHIPPED | OUTPATIENT
Start: 2017-05-22 | End: 2017-05-22

## 2017-05-22 ASSESSMENT — ENCOUNTER SYMPTOMS
NECK MASS: 0
NIGHT SWEATS: 0
PARALYSIS: 0
POOR WOUND HEALING: 0
WEIGHT LOSS: 0
SLEEP DISTURBANCES DUE TO BREATHING: 0
HEARTBURN: 0
DYSPNEA ON EXERTION: 0
HALLUCINATIONS: 0
DOUBLE VISION: 0
NAUSEA: 0
EYE IRRITATION: 1
BREAST MASS: 0
SWOLLEN GLANDS: 0
SPUTUM PRODUCTION: 0
ARTHRALGIAS: 0
LOSS OF CONSCIOUSNESS: 0
WHEEZING: 0
HYPOTENSION: 0
BLOATING: 0
SNORES LOUDLY: 0
FATIGUE: 0
MUSCLE WEAKNESS: 0
DISTURBANCES IN COORDINATION: 0
PALPITATIONS: 0
DIARRHEA: 0
ALTERED TEMPERATURE REGULATION: 0
HEADACHES: 0
SMELL DISTURBANCE: 0
DECREASED APPETITE: 0
BOWEL INCONTINENCE: 0
SYNCOPE: 0
HOT FLASHES: 0
HOARSE VOICE: 1
DYSURIA: 0
RECTAL BLEEDING: 0
TREMORS: 0
DEPRESSION: 0
COUGH DISTURBING SLEEP: 0
TASTE DISTURBANCE: 0
LEG SWELLING: 0
SKIN CHANGES: 0
DIZZINESS: 0
INCREASED ENERGY: 0
BACK PAIN: 0
EXTREMITY NUMBNESS: 0
RECTAL PAIN: 0
TROUBLE SWALLOWING: 0
WEIGHT GAIN: 0
ABDOMINAL PAIN: 0
FEVER: 0
NERVOUS/ANXIOUS: 0
LEG PAIN: 0
HEMOPTYSIS: 0
POLYDIPSIA: 0
VOMITING: 0
TACHYCARDIA: 0
EYE REDNESS: 1
BREAST PAIN: 0
SINUS PAIN: 1
STIFFNESS: 0
MYALGIAS: 0
ORTHOPNEA: 0
SEIZURES: 0
DECREASED CONCENTRATION: 0
CHILLS: 0
EXERCISE INTOLERANCE: 0
WEAKNESS: 0
MEMORY LOSS: 0
TINGLING: 0
BRUISES/BLEEDS EASILY: 0
POSTURAL DYSPNEA: 0
INSOMNIA: 0
JOINT SWELLING: 0
SHORTNESS OF BREATH: 0
POLYPHAGIA: 0
EYE PAIN: 1
PANIC: 0
EYE WATERING: 1
SPEECH CHANGE: 0
NAIL CHANGES: 0
COUGH: 0
SINUS CONGESTION: 1
HEMATURIA: 0
BLOOD IN STOOL: 0
RESPIRATORY PAIN: 0
JAUNDICE: 0
NUMBNESS: 0
SORE THROAT: 1
DIFFICULTY URINATING: 0
FLANK PAIN: 0
DECREASED LIBIDO: 0
CONSTIPATION: 0
LIGHT-HEADEDNESS: 0
MUSCLE CRAMPS: 0
CLAUDICATION: 0
NECK PAIN: 0
HYPERTENSION: 0

## 2017-05-22 NOTE — MR AVS SNAPSHOT
After Visit Summary   5/22/2017    Deisi Miner    MRN: [id number]           Patient Information     Date Of Birth          1975        Visit Information        Provider Department      5/22/2017 7:45 AM Teri Ramirez PA-C [facility] Medical Weight Management        Today's Diagnoses     Morbid obesity, unspecified obesity type (H)    -  1      Care Instructions    See Teri in 3 months for return [facility]      MEDICATION STARTED AT THIS APPOINTMENT    We are starting Phentermine. Take one tablet in the morning.  Call the nurse at [phone] if you have any questions or concerns. (Do not stop taking it if you don't think it's working. For some people it works without them knowing it.)    Phentermine is being prescribed because you identified hunger as one of the main causes for your extra weight.      Our patients on Phentermine find that they:    >feel less hunger    >find it easier to push the plate away   >have an easier time eating less    For some of our patients, these feelings are very real and immediate. For other patients, the feelings are less obvious. They don't feel much of a change but find they've lost weight. Like all weight loss medications, Phentermine  works best when you help it work. This means:  1. Having less tempting high calorie (fattening) food around the house or office. (For people with strong cravings this is very important.)   2. Staying away from situations or people that may trigger your cravings .   3. Eating out only one time or less each week.  4. Eating your meals at a table with the TV or computer off.    Side-effects. Phentermine is generally well tolerated. The main side-effects we see are feelings of racing pulse or rapid heart beat. Some people can get an elevated blood pressure. Because of this we may have you come back within a week or so of starting the medication for a blood pressure check.         In order to get refills of this or any  medication we prescribe you must be seen in the medical weight mgmt clinic every 2-3 months. Please have your pharmacy fax a refill request to [phone].            Follow-ups after your visit        Follow-up notes from your care team     Return in 3 months (on 8/22/2017).      Your next 10 appointments already scheduled     Jun 06, 2017  2:00 PM CDT   Return Visit with aMrcela Dobson, PhD MARJORIE   [facility]  ([facility])    [facility]  [address]  [facility] [id number] [phone]            Jun 20, 2017  3:00 PM CDT   Return Visit with Marcela oDbson, PhD MARJORIE   [facility]  ([facility])    [facility]  [address], [address]  [address]  [facility] [id number] [phone]            Jul 11, 2017  3:00 PM CDT   Return Visit with Marcela Dobson, PhD    [facility]  ([facility])    [facility]  [address], [address]  [address] [facility] [id number] [phone]            Jul 25, 2017  3:00 PM CDT   Return Visit with Marcela Dobson, PhD    [facility]  ([facility])    [facility]  [address], [address]  [address] [facility] [id number] [phone]              Who to contact     Please call your clinic at [phone] to:    Ask questions about your health    Make or cancel appointments    Discuss your medicines    Learn about your test results    Speak to your doctor   If you have compliments or concerns about an experience at your clinic, or if you wish to file a complaint, please contact [facility] Patient Relations at [phone] or email us at [email or web address]         Additional Information About Your Visit        MyChart Information     [employer] gives you secure access to your electronic health record. If you see a  "primary care provider, you can also send messages to your care team and make appointments. If you have questions, please call your primary care clinic.  If you do not have a primary care provider, please call [phone] and they will assist you.      [email or web address] is an electronic gateway that provides easy, online access to your medical records. With [email or web address], you can request a clinic appointment, read your test results, renew a prescription or communicate with your care team.     To access your existing account, please contact your [facility] or call [phone] for assistance.        Care EveryWhere ID     This is your Care EveryWhere ID. This could be used by other organizations to access your [facility] medical records  [id number]        Your Vitals Were     Pulse Height Last Period Pulse Oximetry BMI (Body Mass Index)       95 5' 2\" 04/28/2017 (Approximate) 98% 47.04 kg/m2        Blood Pressure from Last 3 Encounters:   05/22/17 121/84   05/16/17 112/79   05/01/17 104/80    Weight from Last 3 Encounters:   05/22/17 257 lb 3.2 oz   05/16/17 258 lb   05/01/17 260 lb              Today, you had the following     No orders found for display         Today's Medication Changes          These changes are accurate as of: 5/22/17  9:12 AM.  If you have any questions, ask your nurse or doctor.               Start taking these medicines.        Dose/Directions    phentermine 37.5 MG tablet   Commonly known as:  ADIPEX-P   Used for:  Morbid obesity, unspecified obesity type (H)   Replaces:  phentermine 15 MG capsule   Started by:  Teri Ramirez PA-C        Take 1/2 tab in the morning and 1/2 tab at lunch   Quantity:  30 tablet   Refills:  3         Stop taking these medicines if you haven't already. Please contact your care team if you have questions.     phentermine 15 MG capsule   Replaced by:  phentermine 37.5 MG tablet   Stopped by:  Teri Ramirez PA-C           " "topiramate 25 MG tablet   Commonly known as:  TOPAMAX   Stopped by:  Teri Ramirez PA-C                Where to get your medicines      Some of these will need a paper prescription and others can be bought over the counter.  Ask your nurse if you have questions.     Bring a paper prescription for each of these medications     phentermine 37.5 MG tablet                Primary Care Provider Office Phone # Fax #    Alejandro Jasiel Gifford [phone]       [facility] [address] [address]        Thank you!     Thank you for choosing [facility] WEIGHT MANAGEMENT  for your care. Our goal is always to provide you with excellent care. Hearing back from our patients is one way we can continue to improve our services. Please take a few minutes to complete the written survey that you may receive in the mail after your visit with us. Thank you!             Your Updated Medication List - Protect others around you: Learn how to safely use, store and throw away your medicines at www.disposemymeds.org.          This list is accurate as of: 5/22/17  9:12 AM.  Always use your most recent med list.                   Brand Name Dispense Instructions for use    amoxicillin-clavulanate 875-125 MG per tablet    AUGMENTIN    20 tablet    Take 1 tablet by mouth 2 times daily       cyanocobalamin 1000 MCG/ML injection    VITAMIN B12    1 mL    Inject 1 mL (1,000 mcg) Subcutaneous every 30 days       phentermine 37.5 MG tablet    ADIPEX-P    30 tablet    Take 1/2 tab in the morning and 1/2 tab at lunch       sertraline 100 MG tablet    ZOLOFT    90 tablet    Take 1 tablet (100 mg) by mouth daily       trimethoprim-polymyxin b ophthalmic solution    POLYTRIM    1 Bottle    Place 1 drop into the right eye every 4 hours for 7 days       Tuberculin-Allergy Syringes 25G X 5/8\" 1 ML [facility]    B-D TB SYRINGE    12 each    Use one every 30 days for cyanocobalamin (VITAMIN B12) " injection.       VITAMIN D (CHOLECALCIFEROL) PO      Take by mouth daily

## 2017-05-22 NOTE — PROGRESS NOTES
"    Return Medical Weight Management Note     Deisi Miner  MRN:  [id number]  :  1975  MARIANO:  2017    Dear Ирина, Alejandro Weathers,    I had the pleasure of seeing your patient Deisi Miner.  She is a 42 year old female who I am continuing to see for treatment of obesity related to:    No flowsheet data found.    INTERVAL HISTORY:  42 y.o. Female here for [facility] follow up.  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 Last visit las month we restarted phentermine and topiramate.  Topiramate made her feel bloated and didn't feel well so she stopped it a week ago.  She is still taking phentermine 15mg.  She does feel it helps with her hunger.    CURRENT WEIGHT:   257 lbs 3.2 oz    Wt Readings from Last 4 Encounters:   17 257 lb 3.2 oz   17 258 lb   17 260 lb   17 259 lb 8 oz       Height:  5' 2\"  Body Mass Index:  Body mass index is 47.04 kg/(m^2).  Vitals:   /84  Pulse 95  Ht 5' 2\"  Wt 257 lb 3.2 oz  LMP 2017 (Approximate)  SpO2 98%  BMI 47.04 kg/m2    Initial consult weight was 232 on 2015. 245 lbs before weight loss surgery  Weight change since last seen on 2017 is down 2 pounds.   Total gain is 25 pounds. (but up 12 lbs from her pre surgery weight)    Diet and Activity Changes Since Last Visit Reviewed With Patient 2017   I have made the following changes to my diet since my last visit: less sugary drinks   With regards to my diet, I am still struggling with: cravings   For breakfast, I typically eat: oatmeal   For lunch, I typically eat: protein veggie and whole grain crackers   For supper, I typically eat: protein veggie    For snack(s), I typically eat: carbs crackers fruit   I have made the following changes to my activity/exercise since my last visit: more movement   With regards to my activity/exercise, I am still struggling with: pain in knees       Review of Systems     Constitutional:  Negative for fever, " chills, weight loss, weight gain, fatigue, decreased appetite, night sweats, recent stressors, height gain, height loss, post-operative complications, incisional pain, hallucinations, increased energy, hyperactivity and confused.   HENT:  Positive for hoarse voice, sore throat, dry mouth, sinus pain and sinus congestion. Negative for ear pain, hearing loss, tinnitus, nosebleeds, trouble swallowing, mouth sores, ear discharge, tooth pain, gum tenderness, taste disturbance, smell disturbance, hearing aid, bleeding gums and neck mass.    Eyes:  Positive for pain, redness, eye pain, eye watering and eye irritation. Negative for double vision, decreased vision, eye bulging, eye dryness, flashing lights, spots, floaters, strabismus, tunnel vision and jaundice.   Respiratory:   Negative for cough, hemoptysis, sputum production, shortness of breath, wheezing, sleep disturbances due to breathing, snores loudly, respiratory pain, dyspnea on exertion, cough disturbing sleep and postural dyspnea.    Cardiovascular:  Negative for chest pain, dyspnea on exertion, palpitations, orthopnea, claudication, leg swelling, fingers/toes turn blue, hypertension, hypotension, syncope, history of heart murmur, chest pain on exertion, chest pain at rest, pacemaker, few scattered varicosities, leg pain, sleep disturbances due to breathing, tachycardia, light-headedness, exercise intolerance and edema.   Gastrointestinal:  Negative for heartburn, nausea, vomiting, abdominal pain, diarrhea, constipation, blood in stool, melena, rectal pain, bloating, hemorrhoids, bowel incontinence, jaundice, rectal bleeding, coffee ground emesis and change in stool.   Genitourinary:  Negative for bladder incontinence, dysuria, urgency, hematuria, flank pain, vaginal discharge, difficulty urinating, genital sores, dyspareunia, decreased libido, nocturia, voiding less frequently, arousal difficulty, abnormal vaginal bleeding, excessive menstruation, menstrual  "changes, hot flashes, vaginal dryness and postmenopausal bleeding.   Musculoskeletal:  Negative for myalgias, back pain, joint swelling, arthralgias, stiffness, muscle cramps, neck pain, bone pain, muscle weakness and fracture.   Skin:  Negative for nail changes, itching, poor wound healing, rash, hair changes, skin changes, acne, warts, poor wound healing, scarring, flaky skin, Raynaud's phenomenon, sensitivity to sunlight and skin thickening.   Neurological:  Negative for dizziness, tingling, tremors, speech change, seizures, loss of consciousness, weakness, light-headedness, numbness, headaches, disturbances in coordination, extremity numbness, memory loss, difficulty walking and paralysis.   Endo/Heme:  Negative for anemia, swollen glands and bruises/bleeds easily.   Psychiatric/Behavioral:  Negative for depression, hallucinations, memory loss, decreased concentration, mood swings and panic attacks.    Breast:  Negative for breast discharge, breast mass, breast pain and nipple retraction.   Endocrine:  Negative for altered temperature regulation, polyphagia, polydipsia, unwanted hair growth and change in facial hair.      MEDICATIONS:   Current Outpatient Prescriptions   Medication     amoxicillin-clavulanate (AUGMENTIN) 875-125 MG per tablet     trimethoprim-polymyxin b (POLYTRIM) ophthalmic solution     sertraline (ZOLOFT) 100 MG tablet     phentermine 15 MG capsule     Tuberculin-Allergy Syringes (B-D TB SYRINGE) 25G X 5/8\" 1 ML MISC     cyanocobalamin (VITAMIN B12) 1000 MCG/ML injection     VITAMIN D, CHOLECALCIFEROL, PO     topiramate (TOPAMAX) 25 MG tablet     No current facility-administered medications for this visit.        Weight Loss Medication History Reviewed With Patient 5/22/2017   Which weight loss medications are you currently taking on a regular basis?  Phentermine, Topamax (topiramate)   If you are not taking a weight loss medication that was prescribed to you, please indicate why: I did not " like the side effects   Are you having any side effects from the weight loss medication that we have prescribed you? Yes   If you are having side effects please describe: topomax just made ne feel bloated all tge timedry mouth       ASSESSMENT:   42 y.o. Female with weight regain after sleeve and pregnancy here for [facility] follow up.  Didn't tolerate topiramate and wants to continue just the phentermine.     PLAN:   Increase phentermine to 37.5mg dose.  Can take 1/2 tab in the morning and 1/2 tab midday.  Recheck Blood pressure in 1-2 weeks.    FOLLOW-UP:    12 weeks.    Time: 10 min spent on evaluation, management, counseling, education, & motivational interviewing with greater than 50 % of the total time was spent on counseling and coordinating care    Sincerely,    Teri Ramirez PA-C

## 2017-05-22 NOTE — PATIENT INSTRUCTIONS
See Teri in 3 months for return [facility]      MEDICATION STARTED AT THIS APPOINTMENT    We are starting Phentermine. Take one tablet in the morning.  Call the nurse at [phone] if you have any questions or concerns. (Do not stop taking it if you don't think it's working. For some people it works without them knowing it.)    Phentermine is being prescribed because you identified hunger as one of the main causes for your extra weight.      Our patients on Phentermine find that they:    >feel less hunger    >find it easier to push the plate away   >have an easier time eating less    For some of our patients, these feelings are very real and immediate. For other patients, the feelings are less obvious. They don't feel much of a change but find they've lost weight. Like all weight loss medications, Phentermine  works best when you help it work. This means:  1. Having less tempting high calorie (fattening) food around the house or office. (For people with strong cravings this is very important.)   2. Staying away from situations or people that may trigger your cravings .   3. Eating out only one time or less each week.  4. Eating your meals at a table with the TV or computer off.    Side-effects. Phentermine is generally well tolerated. The main side-effects we see are feelings of racing pulse or rapid heart beat. Some people can get an elevated blood pressure. Because of this we may have you come back within a week or so of starting the medication for a blood pressure check.         In order to get refills of this or any medication we prescribe you must be seen in the medical weight mgmt clinic every 2-3 months. Please have your pharmacy fax a refill request to [phone].

## 2017-05-22 NOTE — LETTER
"2017       RE: Deisi Miner  [address] [place] [place] [address]     Dear Colleague,    Thank you for referring your patient, Deisi Miner, to the [facility] MEDICAL WEIGHT MANAGEMENT at [facility]. Please see a copy of my visit note below.        Return Medical Weight Management Note     Deisi Miner  MRN:  [id number]  :  1975  MARIANO:  2017    Dear Alejandro Gifford,    I had the pleasure of seeing your patient Deisi Miner.  She is a 42 year old female who I am continuing to see for treatment of obesity related to:    No flowsheet data found.    INTERVAL HISTORY:  42 y.o. Female here for [facility].  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 Last visit las month we restarted phentermine and topiramate.  Topiramate made her feel bloated and didn't feel well so she stopped it a week ago.  She is still taking phentermine 15mg.  She does feel it helps with her hunger.    CURRENT WEIGHT:   257 lbs 3.2 oz    Wt Readings from Last 4 Encounters:   17 257 lb 3.2 oz   17 258 lb   17 260 lb   17 259 lb 8 oz       Height:  5' 2\"  Body Mass Index:  Body mass index is 47.04 kg/(m^2).  Vitals:   /84  Pulse 95  Ht 5' 2\"  Wt 257 lb 3.2 oz  LMP 2017 (Approximate)  SpO2 98%  BMI 47.04 kg/m2    Initial consult weight was 232 on 2015. 245 lbs before weight loss surgery  Weight change since last seen on 2017 is down 2 pounds.   Total gain is 25 pounds. (but up 12 lbs from her pre surgery weight)    Diet and Activity Changes Since Last Visit Reviewed With Patient 2017   I have made the following changes to my diet since my last visit: less sugary drinks   With regards to my diet, I am still struggling with: cravings   For breakfast, I typically eat: oatmeal   For lunch, I typically eat: protein veggie and whole grain crackers   For supper, I typically eat: protein veggie    For " snack(s), I typically eat: carbs crackers fruit   I have made the following changes to my activity/exercise since my last visit: more movement   With regards to my activity/exercise, I am still struggling with: pain in knees       Review of Systems     Constitutional:  Negative for fever, chills, weight loss, weight gain, fatigue, decreased appetite, night sweats, recent stressors, height gain, height loss, post-operative complications, incisional pain, hallucinations, increased energy, hyperactivity and confused.   HENT:  Positive for hoarse voice, sore throat, dry mouth, sinus pain and sinus congestion. Negative for ear pain, hearing loss, tinnitus, nosebleeds, trouble swallowing, mouth sores, ear discharge, tooth pain, gum tenderness, taste disturbance, smell disturbance, hearing aid, bleeding gums and neck mass.    Eyes:  Positive for pain, redness, eye pain, eye watering and eye irritation. Negative for double vision, decreased vision, eye bulging, eye dryness, flashing lights, spots, floaters, strabismus, tunnel vision and jaundice.   Respiratory:   Negative for cough, hemoptysis, sputum production, shortness of breath, wheezing, sleep disturbances due to breathing, snores loudly, respiratory pain, dyspnea on exertion, cough disturbing sleep and postural dyspnea.    Cardiovascular:  Negative for chest pain, dyspnea on exertion, palpitations, orthopnea, claudication, leg swelling, fingers/toes turn blue, hypertension, hypotension, syncope, history of heart murmur, chest pain on exertion, chest pain at rest, pacemaker, few scattered varicosities, leg pain, sleep disturbances due to breathing, tachycardia, light-headedness, exercise intolerance and edema.   Gastrointestinal:  Negative for heartburn, nausea, vomiting, abdominal pain, diarrhea, constipation, blood in stool, melena, rectal pain, bloating, hemorrhoids, bowel incontinence, jaundice, rectal bleeding, coffee ground emesis and change in stool.  "  Genitourinary:  Negative for bladder incontinence, dysuria, urgency, hematuria, flank pain, vaginal discharge, difficulty urinating, genital sores, dyspareunia, decreased libido, nocturia, voiding less frequently, arousal difficulty, abnormal vaginal bleeding, excessive menstruation, menstrual changes, hot flashes, vaginal dryness and postmenopausal bleeding.   Musculoskeletal:  Negative for myalgias, back pain, joint swelling, arthralgias, stiffness, muscle cramps, neck pain, bone pain, muscle weakness and fracture.   Skin:  Negative for nail changes, itching, poor wound healing, rash, hair changes, skin changes, acne, warts, poor wound healing, scarring, flaky skin, Raynaud's phenomenon, sensitivity to sunlight and skin thickening.   Neurological:  Negative for dizziness, tingling, tremors, speech change, seizures, loss of consciousness, weakness, light-headedness, numbness, headaches, disturbances in coordination, extremity numbness, memory loss, difficulty walking and paralysis.   Endo/Heme:  Negative for anemia, swollen glands and bruises/bleeds easily.   Psychiatric/Behavioral:  Negative for depression, hallucinations, memory loss, decreased concentration, mood swings and panic attacks.    Breast:  Negative for breast discharge, breast mass, breast pain and nipple retraction.   Endocrine:  Negative for altered temperature regulation, polyphagia, polydipsia, unwanted hair growth and change in facial hair.      MEDICATIONS:   Current Outpatient Prescriptions   Medication     amoxicillin-clavulanate (AUGMENTIN) 875-125 MG per tablet     trimethoprim-polymyxin b (POLYTRIM) ophthalmic solution     sertraline (ZOLOFT) 100 MG tablet     phentermine 15 MG capsule     Tuberculin-Allergy Syringes (B-D TB SYRINGE) 25G X 5/8\" 1 ML MISC     cyanocobalamin (VITAMIN B12) 1000 MCG/ML injection     VITAMIN D, CHOLECALCIFEROL, PO     topiramate (TOPAMAX) 25 MG tablet     No current facility-administered medications for this " visit.        Weight Loss Medication History Reviewed With Patient 5/22/2017   Which weight loss medications are you currently taking on a regular basis?  Phentermine, Topamax (topiramate)   If you are not taking a weight loss medication that was prescribed to you, please indicate why: I did not like the side effects   Are you having any side effects from the weight loss medication that we have prescribed you? Yes   If you are having side effects please describe: topomax just made ne feel bloated all tge timedry mouth       ASSESSMENT:   42 y.o. Female with weight regain after sleeve and pregnancy here for [facility] follow up.  Didn't tolerate topiramate and wants to continue just the phentermine.     PLAN:   Increase phentermine to 37.5mg dose.  Can take 1/2 tab in the morning and 1/2 tab midday.  Recheck Blood pressure in 1-2 weeks.    FOLLOW-UP:    12 weeks.    Time: 10 min spent on evaluation, management, counseling, education, & motivational interviewing with greater than 50 % of the total time was spent on counseling and coordinating care    Sincerely,    Teri Ramirez PA-C

## 2017-05-22 NOTE — NURSING NOTE
"Chief Complaint   Patient presents with     Weight Problem     RMWM       Vitals:    05/22/17 0819   BP: 121/84   Pulse: 95   SpO2: 98%   Weight: 257 lb 3.2 oz   Height: 5' 2\"       Body mass index is 47.04 kg/(m^2).  Oswald Hart CMA                       "

## 2017-05-26 ENCOUNTER — RADIANT APPOINTMENT (OUTPATIENT)
Dept: ULTRASOUND IMAGING | Facility: CLINIC | Age: 42
End: 2017-05-26
Attending: OBSTETRICS & GYNECOLOGY
Payer: COMMERCIAL

## 2017-05-26 DIAGNOSIS — N92.1 MENORRHAGIA WITH IRREGULAR CYCLE: ICD-10-CM

## 2017-05-26 PROCEDURE — 76830 TRANSVAGINAL US NON-OB: CPT | Performed by: OBSTETRICS & GYNECOLOGY

## 2017-06-20 ENCOUNTER — OFFICE VISIT (OUTPATIENT)
Dept: PSYCHOLOGY | Facility: CLINIC | Age: 42
End: 2017-06-20
Payer: COMMERCIAL

## 2017-06-20 DIAGNOSIS — F41.1 GAD (GENERALIZED ANXIETY DISORDER): Primary | ICD-10-CM

## 2017-06-20 NOTE — PROGRESS NOTES
"Name:  Deisi Miner  Mrn: [id number]  Date of visit: 6/20/2017    PSYCHOLOGY OUTPATIENT VISIT NOTE    PRESENTING PROBLEMS/SYMPTOMS:  Continuing Anxiety and difficulty with relaxing, (\"I always have to be thinking about something\".) Associated with anxiety: restlessness, insomnia, irritability, muscle tension (neck shoulders) and difficulty controlling.   ( = Altagracia; Daughter = Moira, son = Altaf.) Moira present for visit.  INTERVENTION AND RESPONSE:  CBT, Individual.  Reported on boss leaving, equity conference today, coping with emotions around [employer] shooting, talking with colleagues.  Not wanting to \"live in [place].\"  Importance of time with K, Tues and Th off work.  Encouraged time for self.  Concept of \"generational trauma\" and feelings around principal suicide.  Importance of recognizing and coping with emotions; previous visit with letters from childhood self (and teen).  Mental health priority and her anxiety.  Continuing to cope with grief around Altaf, when others ask if ALEX is only child.    (Previous visit: Presented with new journal, writing letter to \"kimmie Lipscomb\" identifying her \"traumas\", letter to teen self, list of \"triggers,\" \"I know I'm anxious\" and increasing awareness of these sxs, neck and shoulder tension.  Wanting others to \"notice I'm super good,\" hx of embarassment re large family and poverty, not accepted by sibs and family but wanting to protect them, fears of being put in foster care.  Beliefs: that's how it is; no one needs to know.  Now sense of \"not knowing what to do, not having a guide\" while also believing that \"I need to know.\"  Previous visit: (being \"laughed at\" at work) \"feeling different from others\" starting in childhood.  Heavy heart, heart \"crumbling apart; don't let it.\"  Tense shoulders and neck, trying to rationalize, \"I just have to cope by myself.\"  If she allows others to see her tears, \"what will they think about me?\"  Fears. Previous visit:  " "Caught between \"I should fix it\" and \"they should fix it.\"  Anger, \"I can't just let anger rip,\" and \"not feeling safe,\" wanting to escape (feet).  Pattern.  Practiced probe: \"you're not safe.\"  Moved slowly away from therapist, crossed arms, \"I am holding myself together.\"  Practiced, \"yes I am.\"  Discussed and noted parts of body bracing (top part) and wanting to escape (lower part) and sense of tension between these two impulses.)   ASSESSMENT:  The patient presented as casually dressed and groomed.  Held Moira on her lap throughout visit, K somewhat restless.  Identifying some loss of progress with self care, middle insomnia.  Appears that she has gained back all wt lost with gastric banding surgery.  Continuing context: Pregnancy with twins following IVF, birth on 10/18/14 and loss of Altaf on 11/5/14.  Grief. Continued importance of using social support, friends, mom in law.  Some difficulty with partner communication and feeling support.  Family of origin part of \"cult Muslim\" and feeling I'm and outsider, loss and death of her family.  Belief, \"this is how it is so get over it.\"  Fidgety and restless, \"what to do to help this?\"  Sadness.  Fears of infinite sadness. Triggers: teachers \"not understanding\" kids who act out.  Wanting to \"get away from it,\" leaning back in her chair, \"pack it in; freeze and hide my emotions,\" difficulty finding words vs wanting to escape.   Character strategy, industrious overfocused.      DIAGNOSES:  DENISE  PLAN:   Psychotherapy is medically necessary to treat anxiety.    Total time spent equals 45  minutes of individual psychotherapy       "

## 2017-06-20 NOTE — MR AVS SNAPSHOT
After Visit Summary   6/20/2017    Deisi Miner    MRN: [id number]           Patient Information     Date Of Birth          1975        Visit Information        Provider Department      6/20/2017 3:00 PM Marcela Dobson, PhD  [facility]         Today's Diagnoses     DENISE (generalized anxiety disorder)    -  1       Follow-ups after your visit        Your next 10 appointments already scheduled     Jul 05, 2017 11:00 AM CDT   Office Visit with Samantha Sultana MD   [facility]    [address] [place] [id number] [phone]           Bring a current list of meds and any records pertaining to this visit.  For Physicals, please bring immunization records and any forms needing to be filled out.  Please arrive 10 minutes early to complete paperwork.            Jul 11, 2017  3:00 PM CDT   Return Visit with Marcela Dobson, PhD    [facility]  ([facility])    [facility]  [address]rd [address]  [address] [place] [id number] [phone]            Jul 25, 2017  3:00 PM CDT   Return Visit with Marcela Dobson, PhD    [facility]  ([facility])    [facility]  [address]rd [address], [address]  [address] [place] [id number] [phone]            Aug 24, 2017  3:30 PM CDT   (Arrive by 3:15 PM)   Return Weight Management Visit with Teri Ramirez PA-C   [facility] ([facility])    [address] [place] [address]  4th [facility] [id number] [phone]              Who to contact     Please call your clinic at [phone] to:    Ask questions about your health    Make or cancel appointments    Discuss your medicines    Learn about your test results    Speak to your doctor   If you have compliments or concerns about an experience at your clinic,  or if you wish to file a complaint, please contact [facility] Patient Relations at [phone] or email us at [email or web address]         Additional Information About Your Visit        [employer] Information     [employer] gives you secure access to your electronic health record. If you see a primary care provider, you can also send messages to your care team and make appointments. If you have questions, please call your primary care clinic.  If you do not have a primary care provider, please call [phone] and they will assist you.      [employer] is an electronic gateway that provides easy, online access to your medical records. With [employer], you can request a clinic appointment, read your test results, renew a prescription or communicate with your care team.     To access your existing account, please contact your [facility] Clinic or call [phone] for assistance.        Care EveryWhere ID     This is your Care EveryWhere ID. This could be used by other organizations to access your [facility] medical records  [id number]         Blood Pressure from Last 3 Encounters:   05/22/17 121/84   05/16/17 112/79   05/01/17 104/80    Weight from Last 3 Encounters:   05/22/17 116.7 kg (257 lb 3.2 oz)   05/16/17 117 kg (258 lb)   05/01/17 117.9 kg (260 lb)              We Performed the Following     Individual Psychotherapy - Psychotherapy with pt and/or family present 45 mins [38-52 mins] ([id number])        Primary Care Provider Office Phone # Fax #    Alejandro Gifford [phone]       [facility] [address] [address]        Thank you!     Thank you for choosing [facility]   for your care. Our goal is always to provide you with excellent care. Hearing back from our patients is one way we can continue to improve our services. Please take a few minutes to complete the written survey that  "you may receive in the mail after your visit with us. Thank you!             Your Updated Medication List - Protect others around you: Learn how to safely use, store and throw away your medicines at www.disposemymeds.org.          This list is accurate as of: 6/20/17  4:08 PM.  Always use your most recent med list.                   Brand Name Dispense Instructions for use    amoxicillin-clavulanate 875-125 MG per tablet    AUGMENTIN    20 tablet    Take 1 tablet by mouth 2 times daily       cyanocobalamin 1000 MCG/ML injection    VITAMIN B12    1 mL    Inject 1 mL (1,000 mcg) Subcutaneous every 30 days       phentermine 37.5 MG tablet    ADIPEX-P    30 tablet    Take 1/2 tab in the morning and 1/2 tab at lunch       sertraline 100 MG tablet    ZOLOFT    90 tablet    Take 1 tablet (100 mg) by mouth daily       Tuberculin-Allergy Syringes 25G X 5/8\" 1 ML Misc    B-D TB SYRINGE    12 each    Use one every 30 days for cyanocobalamin (VITAMIN B12) injection.       VITAMIN D (CHOLECALCIFEROL) PO      Take by mouth daily         "

## 2017-07-05 ENCOUNTER — OFFICE VISIT (OUTPATIENT)
Dept: OBGYN | Facility: CLINIC | Age: 42
End: 2017-07-05
Payer: COMMERCIAL

## 2017-07-05 VITALS
WEIGHT: 252 LBS | HEIGHT: 62 IN | DIASTOLIC BLOOD PRESSURE: 90 MMHG | TEMPERATURE: 98.3 F | SYSTOLIC BLOOD PRESSURE: 112 MMHG | BODY MASS INDEX: 46.38 KG/M2

## 2017-07-05 DIAGNOSIS — E66.01 MORBID OBESITY, UNSPECIFIED OBESITY TYPE (H): ICD-10-CM

## 2017-07-05 DIAGNOSIS — Z53.8 UNSUCCESSFUL ATTEMPT TO INSERT INTRAUTERINE DEVICE (IUD): Primary | ICD-10-CM

## 2017-07-05 LAB — BETA HCG QUAL IFA URINE: NEGATIVE

## 2017-07-05 PROCEDURE — 84703 CHORIONIC GONADOTROPIN ASSAY: CPT | Performed by: OBSTETRICS & GYNECOLOGY

## 2017-07-05 PROCEDURE — 58300 INSERT INTRAUTERINE DEVICE: CPT | Mod: 52 | Performed by: OBSTETRICS & GYNECOLOGY

## 2017-07-05 RX ORDER — PHENAZOPYRIDINE HYDROCHLORIDE 200 MG/1
200 TABLET, FILM COATED ORAL ONCE
Status: CANCELLED | OUTPATIENT
Start: 2017-07-12

## 2017-07-05 NOTE — PATIENT INSTRUCTIONS
What Mirena Users May Expect    What to watch for right after Mirena is placed  Some women may experience uterine cramps, bleeding, and/or dizziness during and right after Mirena is placed. To help minimize the cramps, you may taken ibuprofen 600 mg with food prior to your appointment. These symptoms should improve over the next 24 hours.  Mild cramping may be present for a few days after your placement  As a follow up, you should check your strings on 4 weeks or visit your clinic once in the first 4 to 12 weeks after Mirena is placed to make sure it is in the right position. After that, Mirena can be checked once a year as part of your routine exam.    Please use a back-up method (abstinence or condoms) for 5 days after placement.    Your periods may change  For the first 3 to 6 months, your monthly period may become irregular. You may also have frequent spotting or light bleeding. A few women have heavy bleeding during this time. After your body adjusts, the number of bleeding days is likely to decrease (but may remain irregular), and you may even find that your periods stop altogether for as long as Mirena is in place. Around the end of the third month of use, you may see up to a 75% reduction in the amount of menstrual bleeding. By one year, about 1 out of 5 users may hay have no period at all. At the end of two years, 70% have little or no bleeding. Your periods will return rapidly once Mirena is removed.     Mirena Strings  You may check your own Mirena strings by inserting a finger into the vagina and feeling the strings as they exit the cervix.  The strings will initially feel firm, like fishing line, but will soften over a few weeks.  After the strings have softened, you or your partner should not be able to feel the strings during intercourse.  If you can feel the IUD, see your healthcare provider to have the position confirmed.  You may use tampons with Mirena in place.    Mirena does not protect against  HIV or STDs.  Mirena does not prevent the formation of ovarian cysts.  Mirena does not typically reduce acne or cause weight gain or mood changes.    Please call [facility] at [phone] if you have questions or concerns.    For more information:  [email or web address]    Endometrial Biopsy  Post-Procedure Patient Instructions      Please monitor for any of the following:      Fever   Cramping after 48 hours   Bleeding for 24-48 hours that is heavier than a normal period   Any bleeding that soaks more than 1 pad per hour      Please call your health care provider if you develop any of the above symptoms or problems.     [facility]   Nurse Triage Line [phone]      Use pads, not tampons, for the bleeding. You may resume sexual relations in 2-3 days after bleeding has stopped.

## 2017-07-05 NOTE — PROGRESS NOTES
"CC:  IUD insertion and biopsy  HPI:  Deisi Miner is a 42 year old female Patient's last menstrual period was 06/17/2017.  Menses are regular q 28-30 days, lasting 7 days.  Prior clinic note from 5/1/17 reviewed today. Heavy cycles.    Had u/s done 5/26 showing:  Uterus: anteverted  Size: 10.4 x 5.4 x 6.5cm      Endometrium: Thickness total 14.6 mm  Findings: thickened and irregular   Right Ovary: 2.2 x 1.8 x 1.4 cm   Left Ovary: 3.0 x 1.8 x 2.0cm  Cul de Sac/Pouch of Abelardo: no free fluid     Due to endometrium being thickened and her BMI, endo biopsy recommended to make sure normal.  She wants to know if it would be easier to just \"take it all out since not having more kids.      The patient's past medical history, social history and family history is reviewed at our visit and updated in EPIC.    Allergies: No known drug allergies    Current Outpatient Prescriptions   Medication Sig Dispense Refill     phentermine (ADIPEX-P) 37.5 MG tablet Take 1/2 tab in the morning and 1/2 tab at lunch 30 tablet 3     sertraline (ZOLOFT) 100 MG tablet Take 1 tablet (100 mg) by mouth daily 90 tablet 3     Tuberculin-Allergy Syringes (B-D TB SYRINGE) 25G X 5/8\" 1 ML MISC Use one every 30 days for cyanocobalamin (VITAMIN B12) injection. 12 each 0     cyanocobalamin (VITAMIN B12) 1000 MCG/ML injection Inject 1 mL (1,000 mcg) Subcutaneous every 30 days 1 mL 11     VITAMIN D, CHOLECALCIFEROL, PO Take by mouth daily           ROS:  C: NEGATIVE for fever, chills, change in weight  R: NEGATIVE for significant cough or SOB  CV: NEGATIVE for chest pain, palpitations or peripheral edema  GI: NEGATIVE for nausea, abdominal pain, heartburn, or change in bowel habits  : NEGATIVE for frequency, dysuria, hematuria, vaginal discharge      EXAM:  Blood pressure 112/90, temperature 98.3  F (36.8  C), temperature source Oral, height 5' 2\" (1.575 m), weight 252 lb (114.3 kg), last menstrual period 06/17/2017, not currently " breastfeeding.  General - pleasant female in no acute distress.  Pelvic - EG: normal adult female, BUS: within normal limits, Vagina: well rugated, no discharge  Using extra large graves coated speculum about 10 min spent trying to find cervix and tenaculum used to try and pull to spec and unable to see even with different positions and pt trying to help me.    ASSESSMENT/PLAN:  (Z53.8) Unsuccessful attempt to insert intrauterine device (IUD)  (primary encounter diagnosis)  (E66.01) Morbid obesity, unspecified obesity type (H)  Comment: BMI 47  Plan: pt was tearful at visit today and knows due to body habitus I could not see cervix.    recc we try again in the OR and she will schedule that. Discussed IV sedation, risks, etc.  Reviewed IF Mirena not working or endometrial biopsy abnormal, then would plan hyst but really she is not great candidate again due to BMI.  Questions answered.       DANE BELL

## 2017-07-05 NOTE — Clinical Note
Surgeon: DANE BELL Assistant: no  Procedure: endometrial biopsy and Mirena IUD placement Diagnosis: menorrhagia with thickened endometrium, unable to place in office Length of surgery: 1 hour Morning admit: No Day/Time Preference: she wants ASAP Anesthesia: IV sedation and Paracervical block Pre-op: With Primary Latex Allergy: No Want pre op labs done: No  I just tried to place this in office and could not do it.  Please call her to schedule this soon, I did discuss possible on a call day like next week.... Thanks latrell

## 2017-07-05 NOTE — MR AVS SNAPSHOT
After Visit Summary   7/5/2017    Deisi Miner    MRN: [id number]           Patient Information     Date Of Birth          1975        Visit Information        Provider Department      7/5/2017 11:00 AM Samantha Sultana MD [facility]        Today's Diagnoses     Unsuccessful attempt to insert intrauterine device (IUD)    -  1    Morbid obesity, unspecified obesity type (H)          Care Instructions    What Mirena Users May Expect    What to watch for right after Mirena is placed  Some women may experience uterine cramps, bleeding, and/or dizziness during and right after Mirena is placed. To help minimize the cramps, you may taken ibuprofen 600 mg with food prior to your appointment. These symptoms should improve over the next 24 hours.  Mild cramping may be present for a few days after your placement  As a follow up, you should check your strings on 4 weeks or visit your clinic once in the first 4 to 12 weeks after Mirena is placed to make sure it is in the right position. After that, Mirena can be checked once a year as part of your routine exam.    Please use a back-up method (abstinence or condoms) for 5 days after placement.    Your periods may change  For the first 3 to 6 months, your monthly period may become irregular. You may also have frequent spotting or light bleeding. A few women have heavy bleeding during this time. After your body adjusts, the number of bleeding days is likely to decrease (but may remain irregular), and you may even find that your periods stop altogether for as long as Mirena is in place. Around the end of the third month of use, you may see up to a 75% reduction in the amount of menstrual bleeding. By one year, about 1 out of 5 users may hay have no period at all. At the end of two years, 70% have little or no bleeding. Your periods will return rapidly once Mirena is removed.     Mirena Strings  You may check your own Mirena strings by  inserting a finger into the vagina and feeling the strings as they exit the cervix.  The strings will initially feel firm, like fishing line, but will soften over a few weeks.  After the strings have softened, you or your partner should not be able to feel the strings during intercourse.  If you can feel the IUD, see your healthcare provider to have the position confirmed.  You may use tampons with Mirena in place.    Mirena does not protect against HIV or STDs.  Mirena does not prevent the formation of ovarian cysts.  Mirena does not typically reduce acne or cause weight gain or mood changes.    Please call [facility] at [phone] if you have questions or concerns.    For more information:  [email or web address]    Endometrial Biopsy  Post-Procedure Patient Instructions      Please monitor for any of the following:      Fever   Cramping after 48 hours   Bleeding for 24-48 hours that is heavier than a normal period   Any bleeding that soaks more than 1 pad per hour      Please call your health care provider if you develop any of the above symptoms or problems.     [facility]   Nurse Triage Line [phone]      Use pads, not tampons, for the bleeding. You may resume sexual relations in 2-3 days after bleeding has stopped.               Follow-ups after your visit        Your next 10 appointments already scheduled     Jul 11, 2017  3:00 PM CDT   Return Visit with Marcela Dobson, PhD    [facility]  ([facility])    [address], [facility] [address]  [address] [facility] [address]   [phone]            Jul 12, 2017   Procedure with Samantha Sultana MD   [facility], Same Day Surgery (--)    [address] [facility] [id number] [phone]            Jul 25, 2017  3:00 PM CDT   Return Visit with Marcela Dobson, PhD    [facility]  ([facility]  "Professional Building  [address]rd Flr[address]  [address]  [facility] [address]   [phone]            Aug 24, 2017  3:30 PM CDT   (Arrive by 3:15 PM)   Return Weight Management Visit with Teri Ramirez PA-C   [facility] ([facility])    [address] [facility] [address]  4th Floor  [facility] [address] [phone]              Who to contact     If you have questions or need follow up information about today's clinic visit or your schedule please contact [facility] directly at [phone].  Normal or non-critical lab and imaging results will be communicated to you by [employer], letter or phone within 4 business days after the clinic has received the results. If you do not hear from us within 7 days, please contact the clinic through [employer] or phone. If you have a critical or abnormal lab result, we will notify you by phone as soon as possible.  Submit refill requests through [employer] or call your pharmacy and they will forward the refill request to us. Please allow 3 business days for your refill to be completed.          Additional Information About Your Visit        MyChart Information     [employer] gives you secure access to your electronic health record. If you see a primary care provider, you can also send messages to your care team and make appointments. If you have questions, please call your primary care clinic.  If you do not have a primary care provider, please call [phone] and they will assist you.        Care EveryWhere ID     This is your Care EveryWhere ID. This could be used by other organizations to access your [facility] medical records  [id number]        Your Vitals Were     Temperature Height Last Period Breastfeeding? BMI (Body Mass Index)       98.3  F (36.8  C) (Oral) 5' 2\" (1.575 m) 06/17/2017 No 46.09 kg/m2        Blood Pressure from Last 3 Encounters:   07/05/17 112/90   05/22/17 121/84   05/16/17 112/79    " Weight from Last 3 Encounters:   07/05/17 252 lb (114.3 kg)   05/22/17 257 lb 3.2 oz (116.7 kg)   05/16/17 258 lb (117 kg)              We Performed the Following     Beta HCG qual IFA urine        Primary Care Provider Office Phone # Fax #    Alejandro Jasiel Gifford [phone]       [facility] [address] [facility] [address]  [facility] [address]        Equal Access to Services     NAVYA HARRY : Hadii aad ku hadasho Soomaali, waaxda luqadaha, qaybta kaalmada adeegyada, waxay idiin hayaan adeeg kharash la'jose m . So [facility] [phone].    ATENCIÓN: Si habla español, tiene a little disposición servicios gratuitos de asistencia lingüística. [facility] [phone].    We comply with applicable federal civil rights laws and [place] laws. We do not discriminate on the basis of race, color, national origin, age, disability sex, sexual orientation or gender identity.            Thank you!     Thank you for choosing [facility]  for your care. Our goal is always to provide you with excellent care. Hearing back from our patients is one way we can continue to improve our services. Please take a few minutes to complete the written survey that you may receive in the mail after your visit with us. Thank you!             Your Updated Medication List - Protect others around you: Learn how to safely use, store and throw away your medicines at www.disposemymeds.org.          This list is accurate as of: 7/5/17  1:08 PM.  Always use your most recent med list.                   Brand Name Dispense Instructions for use Diagnosis    cyanocobalamin 1000 MCG/ML injection    VITAMIN B12    1 mL    Inject 1 mL (1,000 mcg) Subcutaneous every 30 days    Bariatric surgery status       phentermine 37.5 MG tablet    ADIPEX-P    30 tablet    Take 1/2 tab in the morning and 1/2 tab at lunch    Morbid obesity, unspecified obesity type (H)       sertraline 100 MG tablet    ZOLOFT    90 tablet    Take 1 tablet (100  "mg) by mouth daily    DENISE (generalized anxiety disorder)       Tuberculin-Allergy Syringes 25G X 5/8\" 1 ML Misc    B-D TB SYRINGE    12 each    Use one every 30 days for cyanocobalamin (VITAMIN B12) injection.    Bariatric surgery status       VITAMIN D (CHOLECALCIFEROL) PO      Take by mouth daily          "

## 2017-07-10 ENCOUNTER — OFFICE VISIT (OUTPATIENT)
Dept: FAMILY MEDICINE | Facility: CLINIC | Age: 42
End: 2017-07-10
Payer: COMMERCIAL

## 2017-07-10 VITALS
WEIGHT: 254.2 LBS | BODY MASS INDEX: 46.78 KG/M2 | TEMPERATURE: 98.2 F | HEART RATE: 96 BPM | HEIGHT: 62 IN | DIASTOLIC BLOOD PRESSURE: 78 MMHG | SYSTOLIC BLOOD PRESSURE: 120 MMHG | OXYGEN SATURATION: 100 %

## 2017-07-10 DIAGNOSIS — Z01.818 PREOP GENERAL PHYSICAL EXAM: Primary | ICD-10-CM

## 2017-07-10 DIAGNOSIS — N93.8 DUB (DYSFUNCTIONAL UTERINE BLEEDING): ICD-10-CM

## 2017-07-10 LAB
ERYTHROCYTE [DISTWIDTH] IN BLOOD BY AUTOMATED COUNT: 16 % (ref 10–15)
HCT VFR BLD AUTO: 38.1 % (ref 35–47)
HGB BLD-MCNC: 12.4 G/DL (ref 11.7–15.7)
MCH RBC QN AUTO: 26.4 PG (ref 26.5–33)
MCHC RBC AUTO-ENTMCNC: 32.5 G/DL (ref 31.5–36.5)
MCV RBC AUTO: 81 FL (ref 78–100)
PLATELET # BLD AUTO: 307 10E9/L (ref 150–450)
RBC # BLD AUTO: 4.69 10E12/L (ref 3.8–5.2)
WBC # BLD AUTO: 8.2 10E9/L (ref 4–11)

## 2017-07-10 PROCEDURE — 80053 COMPREHEN METABOLIC PANEL: CPT | Performed by: FAMILY MEDICINE

## 2017-07-10 PROCEDURE — 99214 OFFICE O/P EST MOD 30 MIN: CPT | Performed by: FAMILY MEDICINE

## 2017-07-10 PROCEDURE — 93000 ELECTROCARDIOGRAM COMPLETE: CPT | Performed by: FAMILY MEDICINE

## 2017-07-10 PROCEDURE — 36415 COLL VENOUS BLD VENIPUNCTURE: CPT | Performed by: FAMILY MEDICINE

## 2017-07-10 PROCEDURE — 85027 COMPLETE CBC AUTOMATED: CPT | Performed by: FAMILY MEDICINE

## 2017-07-10 NOTE — PROGRESS NOTES
[address] [address]  [phone]  Dept: [phone]    PRE-OP EVALUATION:  Today's date: 7/10/2017    Deisi Miner (: 1975) presents for pre-operative evaluation assessment as requested by Dr. Samantha Sultana.  She requires evaluation and anesthesia risk assessment prior to undergoing surgery/procedure for treatment of uterus.  Proposed procedure: Endometrial sampling and Insert Intrauterine device    Date of Surgery/ Procedure: 17  Time of Surgery/ Procedure: 10:30 am  Hospital/Surgical Facility: [facility]   Fax number for surgical facility:   Primary Physician: Alejandro Gifford  Type of Anesthesia Anticipated: Monitor Anesthesia     Patient has a Health Care Directive or Living Will:  NO    Preop Questions 2017   1.  Do you have a history of heart attack, stroke, stent, bypass or surgery on an artery in the head, neck, heart or legs? No   2.  Do you ever have any pain or discomfort in your chest? No   3.  Do you have a history of  Heart Failure? No   4.   Are you troubled by shortness of breath when:  walking on a level surface, or up a slight hill, or at night? No   5.  Do you currently have a cold, bronchitis or other respiratory infection? No   6.  Do you have a cough, shortness of breath, or wheezing? No   7.  Do you sometimes get pains in the calves of your legs when you walk? No   8. Do you or anyone in your family have previous history of blood clots? No   9.  Do you or does anyone in your family have a serious bleeding problem such as prolonged bleeding following surgeries or cuts? No   10. Have you ever had problems with anemia or been told to take iron pills? YES -    11. Have you had any abnormal blood loss such as black, tarry or bloody stools, or abnormal vaginal bleeding? YES -    12. Have you ever had a blood transfusion? No   13. Have you or any of your relatives ever had problems  with anesthesia? No   14. Do you have sleep apnea, excessive snoring or daytime drowsiness? No   15. Do you have any prosthetic heart valves? No   16. Do you have prosthetic joints? No   17. Is there any chance that you may be pregnant? No         HPI:                                                      Brief HPI related to upcoming procedure: see GYn note/ DUB      See problem list for active medical problems.  Problems all longstanding and stable, except as noted/documented.  See ROS for pertinent symptoms related to these conditions.                                                                                                  .    MEDICAL HISTORY:                                                      Patient Active Problem List    Diagnosis Date Noted     Morbid obesity (H) 2017     Priority: High     Cervical high risk HPV (human papillomavirus) test positive 2017     Priority: Medium     17: NIL pap, + HR HPV (not 16 or 18) result. Plan cotest in 1 year.       Bariatric surgery status 2012     CARDIOVASCULAR SCREENING; LDL GOAL LESS THAN 160 2010     Obesity 2009      Past Medical History:   Diagnosis Date     Arthritis      Cancer (H)      Cervical high risk HPV (human papillomavirus) test positive 2017: NIL pap, + HR HPV (not 16 or 18) result.      Depression     anxiety, sees therapist     Diabetes (H)      Hypertension      Obesity      Past Surgical History:   Procedure Laterality Date      SECTION N/A 10/18/2014    Procedure:  SECTION;  Surgeon: Edilma Zayas MD;  Location:  [facility]     COLONOSCOPY       GYN SURGERY  2013    Fallopian Tubes Removed     HC AMNIOCENTESIS DIAGNOSTIC  2014     HC AMNIOCENTESIS DIAGNOSTIC  2014     hsg  11    blockage of both tubes     LAPAROSCOPIC GASTRIC SLEEVE  2012    Procedure: LAPAROSCOPIC GASTRIC SLEEVE;  Laparoscopic Sleeve Gastrectomy and Hiatal Hernia Repair;   "Surgeon: Eze Muhammad MD;  Location: [facility]     LAPAROSCOPIC SALPINGECTOMY  5/7/2013    Procedure: LAPAROSCOPIC SALPINGECTOMY;  Operative Laparoscopy, lysis of adhesions, Bilateral Salpingectomies ;  Surgeon: Abdelrahman Corcoran MD;  Location: [facility] OR     U/S GUIDANCE FOR TVOR (CLINIC ONLY)  10/2013     Current Outpatient Prescriptions   Medication Sig Dispense Refill     phentermine (ADIPEX-P) 37.5 MG tablet Take 1/2 tab in the morning and 1/2 tab at lunch 30 tablet 3     sertraline (ZOLOFT) 100 MG tablet Take 1 tablet (100 mg) by mouth daily 90 tablet 3     Tuberculin-Allergy Syringes (B-D TB SYRINGE) 25G X 5/8\" 1 ML MISC Use one every 30 days for cyanocobalamin (VITAMIN B12) injection. 12 each 0     cyanocobalamin (VITAMIN B12) 1000 MCG/ML injection Inject 1 mL (1,000 mcg) Subcutaneous every 30 days 1 mL 11     VITAMIN D, CHOLECALCIFEROL, PO Take by mouth daily       OTC products: None, except as noted above    Allergies   Allergen Reactions     No Known Drug Allergies       Latex Allergy: NO    Social History   Substance Use Topics     Smoking status: Never Smoker     Smokeless tobacco: Never Used     Alcohol use No      Comment: rarely     History   Drug Use No       REVIEW OF SYSTEMS:                                                    C: NEGATIVE for fever, chills, change in weight  I: NEGATIVE for worrisome rashes, moles or lesions  E: NEGATIVE for vision changes or irritation  E/M: NEGATIVE for ear, mouth and throat problems  R: NEGATIVE for significant cough or SOB  B: NEGATIVE for masses, tenderness or discharge  CV: NEGATIVE for chest pain, palpitations or peripheral edema  GI: NEGATIVE for nausea, abdominal pain, heartburn, or change in bowel habits  : NEGATIVE for frequency, dysuria, or hematuria  M: NEGATIVE for significant arthralgias or myalgia  N: NEGATIVE for weakness, dizziness or paresthesias  E: NEGATIVE for temperature intolerance, skin/hair changes  H: NEGATIVE for bleeding problems  P: " "NEGATIVE for changes in mood or affect    EXAM:                                                    /78  Pulse 96  Temp 98.2  F (36.8  C) (Oral)  Ht 5' 2\" (1.575 m)  Wt 254 lb 3.2 oz (115.3 kg)  LMP 06/17/2017  SpO2 100%  BMI 46.49 kg/m2    GENERAL APPEARANCE: healthy, alert and no distress     EYES: EOMI, PERRL     HENT: ear canals and TM's normal and nose and mouth without ulcers or lesions     NECK: no adenopathy, no asymmetry, masses, or scars and thyroid normal to palpation     RESP: lungs clear to auscultation - no rales, rhonchi or wheezes     CV: regular rates and rhythm, normal S1 S2, no S3 or S4 and no murmur, click or rub     ABDOMEN:  soft, nontender, no HSM or masses and bowel sounds normal     MS: extremities normal- no gross deformities noted, no evidence of inflammation in joints, FROM in all extremities.     SKIN: no suspicious lesions or rashes     NEURO: Normal strength and tone, sensory exam grossly normal, mentation intact and speech normal     PSYCH: mentation appears normal. and affect normal/bright     LYMPHATICS: No axillary, cervical, or supraclavicular nodes    DIAGNOSTICS:                                                    EKG: appears normal, NSR, normal axis, normal intervals, no acute ST/T changes c/w ischemia, no LVH by voltage criteria, unchanged from previous tracings    Recent Labs   Lab Test  05/01/17   1028  08/13/15   1617  07/31/15   0730   08/15/14   1024   06/16/14   0826   HGB  12.3  12.4  12.8   < >  12.1   < >  12.7   PLT  326  287   --    < >   --    --    --    INR   --   1.04   --    --    --    --    --    NA   --    --   139   --    --    --   137   POTASSIUM   --    --   3.9   --    --    --   3.8   CR   --    --   0.80   --    --    --   0.63   A1C   --    --   5.5   --   5.3   --   5.2    < > = values in this interval not displayed.        IMPRESSION:                                                    Reason for surgery/procedure: thickened " endometroium  Diagnosis/reason for consult: pre op    The proposed surgical procedure is considered LOW risk.    REVISED CARDIAC RISK INDEX  The patient has the following serious cardiovascular risks for perioperative complications such as (MI, PE, VFib and 3  AV Block):  No serious cardiac risks  INTERPRETATION: 1 risks: Class II (low risk - 0.9% complication rate)    The patient has the following additional risks for perioperative complications:  No identified additional risks      ICD-10-CM    1. Preop general physical exam Z01.818        RECOMMENDATIONS:                                                           --Patient is to take all scheduled medications on the day of surgery EXCEPT for modifications listed below.    APPROVAL GIVEN to proceed with proposed procedure, without further diagnostic evaluation       Signed Electronically by: Alejandro Gifford MD    Copy of this evaluation report is provided to requesting physician.    [facility] Preop Guidelines

## 2017-07-10 NOTE — PROGRESS NOTES
"Chief Complaint   Patient presents with     Pre-Op Exam       Initial /78  Pulse 96  Temp 98.2  F (36.8  C) (Oral)  Ht 5' 2\" (1.575 m)  Wt 254 lb 3.2 oz (115.3 kg)  LMP 06/17/2017  SpO2 100%  BMI 46.49 kg/m2 Estimated body mass index is 46.49 kg/(m^2) as calculated from the following:    Height as of this encounter: 5' 2\" (1.575 m).    Weight as of this encounter: 254 lb 3.2 oz (115.3 kg).  Medication Reconciliation: complete     Yasmin Borjas MA      "

## 2017-07-10 NOTE — MR AVS SNAPSHOT
After Visit Summary   7/10/2017    Deisi Miner    MRN: [id number]           Patient Information     Date Of Birth          1975        Visit Information        Provider Department      7/10/2017 3:30 PM Alejandro Gifford MD [facility]        Today's Diagnoses     Preop general physical exam    -  1    DUB (dysfunctional uterine bleeding)          Care Instructions      Before Your Surgery      Call your surgeon if there is any change in your health. This includes signs of a cold or flu (such as a sore throat, runny nose, cough, rash or fever).    Do not smoke, drink alcohol or take over the counter medicine (unless your surgeon or primary care doctor tells you to) for the 24 hours before and after surgery.    If you take prescribed drugs: Follow your doctor s orders about which medicines to take and which to stop until after surgery.    Eating and drinking prior to surgery: follow the instructions from your surgeon    Take a shower or bath the night before surgery. Use the soap your surgeon gave you to gently clean your skin. If you do not have soap from your surgeon, use your regular soap. Do not shave or scrub the surgery site.  Wear clean pajamas and have clean sheets on your bed.           Follow-ups after your visit        Your next 10 appointments already scheduled     Jul 11, 2017  3:00 PM CDT   Return Visit with Marcela Dobson, PhD MARJORIE   [facility]  ([facility])    [place] [employer] [facility]  [address]rd [address]  [address] [address]   [phone]            Jul 12, 2017   Procedure with Samantha Sultana MD   [facility], Same Day Surgery (--)    [address] [facility] [address]   [phone]            Jul 25, 2017  3:00 PM CDT   Return Visit with Marcela Dobson, PhD MARJORIE   [facility]  ([facility])    [facility]  [address]rd [address]  [address]  [address]  [facility] [id number] [phone]            Aug 24, 2017  3:30 PM CDT   (Arrive by 3:15 PM)   Return Weight Management Visit with Teri Ramirez PA-C   [facility] ([facility])    [address]  4th Floor  [facility] [id number] [phone]              Who to contact     If you have questions or need follow up information about today's clinic visit or your schedule please contact [facility] directly at [phone].  Normal or non-critical lab and imaging results will be communicated to you by [employer], letter or phone within 4 business days after the clinic has received the results. If you do not hear from us within 7 days, please contact the clinic through [employer] or phone. If you have a critical or abnormal lab result, we will notify you by phone as soon as possible.  Submit refill requests through [employer] or call your pharmacy and they will forward the refill request to us. Please allow 3 business days for your refill to be completed.          Additional Information About Your Visit        [employer] Information     [employer] gives you secure access to your electronic health record. If you see a primary care provider, you can also send messages to your care team and make appointments. If you have questions, please call your primary care clinic.  If you do not have a primary care provider, please call [phone] and they will assist you.        Care EveryWhere ID     This is your Care EveryWhere ID. This could be used by other organizations to access your [facility] medical records  [id number]        Your Vitals Were     Pulse Temperature Height Last Period Pulse Oximetry BMI (Body Mass Index)    96 98.2  F (36.8  C) (Oral) 5' 2\" (1.575 m) 06/17/2017 100% 46.49 kg/m2       Blood Pressure from Last 3 Encounters:   07/10/17 120/78   07/05/17 112/90   05/22/17 121/84    Weight from Last 3 Encounters:   07/10/17 254 lb " 3.2 oz (115.3 kg)   07/05/17 252 lb (114.3 kg)   05/22/17 257 lb 3.2 oz (116.7 kg)              We Performed the Following     CBC with platelets     Comprehensive metabolic panel     EKG 12-lead complete w/read - Clinics        Primary Care Provider Office Phone # Fax #    Alejandro Jasiel Gifford [phone]       [facility] [address]  [facility] [address]        Equal Access to Services     NAVYA HARRY : Hadii aad ku hadasho Soomaali, waaxda luqadaha, qaybta kaalmada adeegyada, waxay idiin hayaan adeeg kharamaru ladaniel . So [place] -[phone].    ATENCIÓN: Si habla español, tiene a little disposición servicios gratuitos de asistencia lingüística. Juan Joseame al [phone].    We comply with applicable federal civil rights laws and [place] laws. We do not discriminate on the basis of race, color, national origin, age, disability sex, sexual orientation or gender identity.            Thank you!     Thank you for choosing [facility]  [place] your [place]. Our goal is always to provide you with excellent care. Hearing back from our patients is one way we can continue to improve our services. Please take a few minutes to complete the written survey that you may receive in the mail after your visit with us. Thank you!             Your Updated Medication List - Protect others around you: Learn how to safely use, store and throw away your medicines at www.disposemymeds.org.          This list is accurate as of: 7/10/17  4:10 PM.  Always use your most recent med list.                   Brand Name Dispense Instructions for use Diagnosis    cyanocobalamin 1000 MCG/ML injection    VITAMIN B12    1 mL    Inject 1 mL (1,000 mcg) Subcutaneous every 30 days    Bariatric surgery status       phentermine 37.5 MG tablet    ADIPEX-P    30 tablet    Take 1/2 tab in the morning and 1/2 tab at lunch    Morbid obesity, unspecified obesity type (H)       sertraline 100 MG tablet    ZOLOFT    90  "tablet    Take 1 tablet (100 mg) by mouth daily    DENISE (generalized anxiety disorder)       Tuberculin-Allergy Syringes 25G X 5/8\" 1 ML Misc    B-D TB SYRINGE    12 each    Use one every 30 days for cyanocobalamin (VITAMIN B12) injection.    Bariatric surgery status       VITAMIN D (CHOLECALCIFEROL) PO      Take by mouth daily          "

## 2017-07-11 ENCOUNTER — OFFICE VISIT (OUTPATIENT)
Dept: PSYCHOLOGY | Facility: CLINIC | Age: 42
End: 2017-07-11
Payer: COMMERCIAL

## 2017-07-11 DIAGNOSIS — F41.1 GAD (GENERALIZED ANXIETY DISORDER): Primary | ICD-10-CM

## 2017-07-11 DIAGNOSIS — F50.9 EATING DISORDER, UNSPECIFIED: ICD-10-CM

## 2017-07-11 LAB
ALBUMIN SERPL-MCNC: 3.5 G/DL (ref 3.4–5)
ALP SERPL-CCNC: 50 U/L (ref 40–150)
ALT SERPL W P-5'-P-CCNC: 19 U/L (ref 0–50)
ANION GAP SERPL CALCULATED.3IONS-SCNC: 10 MMOL/L (ref 3–14)
AST SERPL W P-5'-P-CCNC: 14 U/L (ref 0–45)
BILIRUB SERPL-MCNC: 0.2 MG/DL (ref 0.2–1.3)
BUN SERPL-MCNC: 10 MG/DL (ref 7–30)
CALCIUM SERPL-MCNC: 8.6 MG/DL (ref 8.5–10.1)
CHLORIDE SERPL-SCNC: 104 MMOL/L (ref 94–109)
CO2 SERPL-SCNC: 24 MMOL/L (ref 20–32)
CREAT SERPL-MCNC: 0.78 MG/DL (ref 0.52–1.04)
GFR SERPL CREATININE-BSD FRML MDRD: 81 ML/MIN/1.7M2
GLUCOSE SERPL-MCNC: 80 MG/DL (ref 70–99)
POTASSIUM SERPL-SCNC: 4.4 MMOL/L (ref 3.4–5.3)
PROT SERPL-MCNC: 7.7 G/DL (ref 6.8–8.8)
SODIUM SERPL-SCNC: 138 MMOL/L (ref 133–144)

## 2017-07-11 NOTE — PROGRESS NOTES
"Name:  Deisi Miner  Mrn: [id number]  Date of visit: 7/11/2017    PSYCHOLOGY OUTPATIENT VISIT NOTE    PRESENTING PROBLEMS/SYMPTOMS:  Continuing Anxiety and difficulty with relaxing, (\"I always have to be thinking about something\".) Associated with anxiety: restlessness, insomnia, irritability, muscle tension (neck shoulders) and difficulty controlling.   ( = Altagracia; Daughter = Moira, son = Altaf.)   INTERVENTION AND RESPONSE:  CBT, Individual.  Reported on [email or web address], learning her dad is indeed her dad, positive to confirm this.  Also surgery tomorrow, having to go to hospital, \"one more thing different about me,\" disappointment in body \"and my body is me.\"  Discussed hx, \"body not working,\" infertility.  Since May, 10lb wt loss with efforts to lose.  Discussed visit with Dr. Sultana, not wanting tears to be noticed, worried about others seeing her emotions.  Interactions with sisters, not connecting, \"They don't ask about me.\"  Pattern of not being noticed or seen and difficulty with being seen.  REcognizing, \"I need help,\" and psychology visits.  Part of me is crazy and I don't want to be.  Importance of continuing visits, encouraged increasing frequency.  Pt to consider.    (Previous visit: Presented with new journal, writing letter to \"kimmie Lipscomb\" identifying her \"traumas\", letter to teen self, list of \"triggers,\" \"I know I'm anxious\" and increasing awareness of these sxs, neck and shoulder tension.  Wanting others to \"notice I'm super good,\" hx of embarassment re large family and poverty, not accepted by sibs and family but wanting to protect them, fears of being put in foster care.  Beliefs: that's how it is; no one needs to know.  Now sense of \"not knowing what to do, not having a guide\" while also believing that \"I need to know.\"  Previous visit: (being \"laughed at\" at work) \"feeling different from others\" starting in childhood.  Heavy heart, heart \"crumbling apart; don't let it.\"  Tense " "shoulders and neck, trying to rationalize, \"I just have to cope by myself.\"  If she allows others to see her tears, \"what will they think about me?\"  Fears. Previous visit:  Caught between \"I should fix it\" and \"they should fix it.\"  Anger, \"I can't just let anger rip,\" and \"not feeling safe,\" wanting to escape (feet).  Pattern.  Practiced probe: \"you're not safe.\"  Moved slowly away from therapist, crossed arms, \"I am holding myself together.\"  Practiced, \"yes I am.\"  Discussed and noted parts of body bracing (top part) and wanting to escape (lower part) and sense of tension between these two impulses.)   ASSESSMENT:  The patient presented as casually dressed and groomed.  Sad and tearful, especially discussing interactions with sisters.  Wanting connection but sisters disconnecting.  Some upset around wt gain, but more focused on pain of \"being different.\"    Not processing emotional component of eating and/or wt gain.  Will be important to work on connecting emotions and body in therapy. Continuing context: Pregnancy with twins following IVF, birth on 10/18/14 and loss of Altaf on 11/5/14.  Grief. Continued importance of using social support, friends, mom in law.  Some difficulty with partner communication and feeling support.  Family of origin part of \"cult Protestant\" and feeling I'm and outsider, loss and death of her family.  Belief, \"this is how it is so get over it.\"  Fidgety and restless, \"what to do to help this?\"  Sadness.  Fears of infinite sadness. Triggers: teachers \"not understanding\" kids who act out.  Wanting to \"get away from it,\" leaning back in her chair, \"pack it in; freeze and hide my emotions,\" difficulty finding words vs wanting to escape.   Character strategy, industrious overfocused.    DIAGNOSES:  DENISE; MARTA NOS  PLAN:   Psychotherapy is medically necessary to treat anxiety.    Total time spent equals 50  minutes of individual psychotherapy       "

## 2017-07-11 NOTE — MR AVS SNAPSHOT
After Visit Summary   7/11/2017    Deisi Miner    MRN: [id number]           Patient Information     Date Of Birth          1975        Visit Information        Provider Department      7/11/2017 3:00 PM Marcela Dobson, PhD  [facility]         Today's Diagnoses     DENISE (generalized anxiety disorder)    -  1    Eating disorder, unspecified           Follow-ups after your visit        Your next 10 appointments already scheduled     Jul 12, 2017   Procedure with Samantha Sultana MD   [facility], [place], Same Day Surgery (--)    [address] [address]   [phone]            Jul 25, 2017  3:00 PM CDT   Return Visit with Marcela Dobson, PhD MARJORIE   [facility]  ([facility])    [place] [facility]  [address]rd [address]r, [address] [facility] [address]   [phone]            Aug 09, 2017  3:00 PM CDT   Return Visit with Marcela Dobson, PhD    [facility]  ([facility])    [place] [facility]  [address]rd Fl[address], [address]  [address]  [facility] [address]   [phone]            Aug 22, 2017  3:00 PM CDT   Return Visit with Marcela Dobson, PhD    [facility]  ([facility])    [facility] [facility]  [address], [address]  [address]  [facility] [address]   [phone]            Aug 24, 2017  3:30 PM CDT   (Arrive by 3:15 PM)   Return Weight Management Visit with Teri Ramirez PA-C   [facility] ([facility])    [address]  4th Floor  [facility] [address] [phone]            Sep 06, 2017  3:00 PM CDT   Return Visit with Marcela Dobson, PhD    [facility]  ([facility])    [facility]  [address]rd [address], [address]  [address]  [facility] [address]   [phone]            Sep 19, 2017  3:00 PM  CDT   Return Visit with Marcela Dobson, PhD MARJORIE   [facility]  ([facility])    [place] [facility] [address]  [address], [address]  [address]  [facility] [address] [phone]            Oct 03, 2017  3:00 PM CDT   Return Visit with Marcela Dobson, PhD MARJORIE   [facility]  ([facility])    [place] [facility] [address]  [address], [address]  [address]  [facility] [address]   [phone]            Oct 17, 2017  3:00 PM CDT   Return Visit with Marcela Dobson, PhD MARJORIE   [facility]  ([facility])    [place] [facility] [address]  [address], [address]  [address]  [facility] [address] [phone]            Nov 01, 2017  3:00 PM CDT   Return Visit with Marcela Dobson, PhD MARJORIE   [facility]  ([facility])    [place] [facility] [address]  [address]  [address]  [facility] [address]   [phone]              Who to contact     Please call your clinic at [phone] to:    Ask questions about your health    Make or cancel appointments    Discuss your medicines    Learn about your test results    Speak to your doctor   If you have compliments or concerns about an experience at your clinic, or if you wish to file a complaint, please contact [facility] Patient Relations at [phone] or email us at [email or web address]         Additional Information About Your Visit        [employer] Information     [employer] gives you secure access to your electronic health record. If you see a primary care provider, you can also send messages to your care team and make appointments. If you have questions, please call your primary care clinic.  If you do not have a primary care provider, please call [phone] and they will assist you.      [facility] is an electronic gateway that provides easy, online access to your medical records. With  Jessica, you can request a clinic appointment, read your test results, renew a prescription or communicate with your care team.     To access your existing account, please contact your [facility] or call [phone] for assistance.        Care EveryWhere ID     This is your Care EveryWhere ID. This could be used by other organizations to access your [place] medical records  [id number]        Your Vitals Were     Last Period                   06/17/2017            Blood Pressure from Last 3 Encounters:   07/10/17 120/78   07/05/17 112/90   05/22/17 121/84    Weight from Last 3 Encounters:   07/10/17 115.3 kg (254 lb 3.2 oz)   07/05/17 114.3 kg (252 lb)   05/22/17 116.7 kg (257 lb 3.2 oz)              We Performed the Following     Individual Psychotherapy - Psychotherapy with pt and/or family present 45 mins [38-52 mins] (30063)        Primary Care Provider Office Phone # Fax #    Alejandro Jasiel Gifford [phone]       [facility] [address] [address]        Equal Access to Services     [facility] : Hadii aad ku hadasho Soomaali, waaxda luqadaha, qaybta kaalmada aderyanyada, lilo schafer . So [facility] [phone].    ATENCIÓN: Si habla español, tiene a little disposición servicios gratuitos de asistencia lingüística. Llame al [phone].    We comply with applicable federal civil rights laws and [place] laws. We do not discriminate on the basis of race, color, national origin, age, disability sex, sexual orientation or gender identity.            Thank you!     Thank you for choosing [facility]   for your care. Our goal is always to provide you with excellent care. Hearing back from our patients is one way we can continue to improve our services. Please take a few minutes to complete the written survey that you may receive in the mail after your visit with us. Thank you!            "  Your Updated Medication List - Protect others around you: Learn how to safely use, store and throw away your medicines at www.disposemymeds.org.          This list is accurate as of: 7/11/17  4:10 PM.  Always use your most recent med list.                   Brand Name Dispense Instructions for use Diagnosis    cyanocobalamin 1000 MCG/ML injection    VITAMIN B12    1 mL    Inject 1 mL (1,000 mcg) Subcutaneous every 30 days    Bariatric surgery status       phentermine 37.5 MG tablet    ADIPEX-P    30 tablet    Take 1/2 tab in the morning and 1/2 tab at lunch    Morbid obesity, unspecified obesity type (H)       sertraline 100 MG tablet    ZOLOFT    90 tablet    Take 1 tablet (100 mg) by mouth daily    DENISE (generalized anxiety disorder)       Tuberculin-Allergy Syringes 25G X 5/8\" 1 ML Misc    B-D TB SYRINGE    12 each    Use one every 30 days for cyanocobalamin (VITAMIN B12) injection.    Bariatric surgery status       VITAMIN D (CHOLECALCIFEROL) PO      Take by mouth daily          "

## 2017-07-12 ENCOUNTER — ANESTHESIA (OUTPATIENT)
Dept: SURGERY | Facility: CLINIC | Age: 42
End: 2017-07-12
Payer: COMMERCIAL

## 2017-07-12 ENCOUNTER — SURGERY (OUTPATIENT)
Age: 42
End: 2017-07-12

## 2017-07-12 ENCOUNTER — HOSPITAL ENCOUNTER (OUTPATIENT)
Facility: CLINIC | Age: 42
Discharge: HOME | End: 2017-07-12
Attending: OBSTETRICS & GYNECOLOGY | Admitting: OBSTETRICS & GYNECOLOGY
Payer: COMMERCIAL

## 2017-07-12 ENCOUNTER — ANESTHESIA EVENT (OUTPATIENT)
Dept: SURGERY | Facility: CLINIC | Age: 42
End: 2017-07-12
Payer: COMMERCIAL

## 2017-07-12 VITALS
BODY MASS INDEX: 46.53 KG/M2 | TEMPERATURE: 98.1 F | OXYGEN SATURATION: 97 % | SYSTOLIC BLOOD PRESSURE: 129 MMHG | DIASTOLIC BLOOD PRESSURE: 91 MMHG | WEIGHT: 252.87 LBS | HEIGHT: 62 IN | RESPIRATION RATE: 16 BRPM

## 2017-07-12 DIAGNOSIS — Z30.430 ENCOUNTER FOR INSERTION OF MIRENA IUD: Primary | ICD-10-CM

## 2017-07-12 LAB — HCG UR QL: NEGATIVE

## 2017-07-12 PROCEDURE — 36000051 ZZH SURGERY LEVEL 2 1ST 30 MIN - UMMC: Performed by: OBSTETRICS & GYNECOLOGY

## 2017-07-12 PROCEDURE — 81025 URINE PREGNANCY TEST: CPT | Performed by: OBSTETRICS & GYNECOLOGY

## 2017-07-12 PROCEDURE — 25000132 ZZH RX MED GY IP 250 OP 250 PS 637: Performed by: OBSTETRICS & GYNECOLOGY

## 2017-07-12 PROCEDURE — 25000128 H RX IP 250 OP 636: Performed by: NURSE ANESTHETIST, CERTIFIED REGISTERED

## 2017-07-12 PROCEDURE — 40000170 ZZH STATISTIC PRE-PROCEDURE ASSESSMENT II: Performed by: OBSTETRICS & GYNECOLOGY

## 2017-07-12 PROCEDURE — 27210794 ZZH OR GENERAL SUPPLY STERILE: Performed by: OBSTETRICS & GYNECOLOGY

## 2017-07-12 PROCEDURE — 37000008 ZZH ANESTHESIA TECHNICAL FEE, 1ST 30 MIN: Performed by: OBSTETRICS & GYNECOLOGY

## 2017-07-12 PROCEDURE — 71000027 ZZH RECOVERY PHASE 2 EACH 15 MINS: Performed by: OBSTETRICS & GYNECOLOGY

## 2017-07-12 PROCEDURE — 25000125 ZZHC RX 250: Performed by: NURSE ANESTHETIST, CERTIFIED REGISTERED

## 2017-07-12 PROCEDURE — 25000125 ZZHC RX 250: Performed by: OBSTETRICS & GYNECOLOGY

## 2017-07-12 PROCEDURE — 36000053 ZZH SURGERY LEVEL 2 EA 15 ADDTL MIN - UMMC: Performed by: OBSTETRICS & GYNECOLOGY

## 2017-07-12 PROCEDURE — 88305 TISSUE EXAM BY PATHOLOGIST: CPT | Performed by: OBSTETRICS & GYNECOLOGY

## 2017-07-12 PROCEDURE — 58120 DILATION AND CURETTAGE: CPT | Mod: GC | Performed by: OBSTETRICS & GYNECOLOGY

## 2017-07-12 PROCEDURE — 37000009 ZZH ANESTHESIA TECHNICAL FEE, EACH ADDTL 15 MIN: Performed by: OBSTETRICS & GYNECOLOGY

## 2017-07-12 PROCEDURE — 58300 INSERT INTRAUTERINE DEVICE: CPT | Mod: GC | Performed by: OBSTETRICS & GYNECOLOGY

## 2017-07-12 PROCEDURE — 88305 TISSUE EXAM BY PATHOLOGIST: CPT | Mod: 26 | Performed by: OBSTETRICS & GYNECOLOGY

## 2017-07-12 DEVICE — IUD CONTRACEPTIVE DEVICE MIRENA 50419-4230-01: Type: IMPLANTABLE DEVICE | Site: UTERUS | Status: FUNCTIONAL

## 2017-07-12 RX ORDER — PROPOFOL 10 MG/ML
INJECTION, EMULSION INTRAVENOUS CONTINUOUS PRN
Status: DISCONTINUED | OUTPATIENT
Start: 2017-07-12 | End: 2017-07-12

## 2017-07-12 RX ORDER — HYDROXYZINE HYDROCHLORIDE 25 MG/1
25 TABLET, FILM COATED ORAL
Status: DISCONTINUED | OUTPATIENT
Start: 2017-07-12 | End: 2017-07-12 | Stop reason: HOSPADM

## 2017-07-12 RX ORDER — HYDROMORPHONE HYDROCHLORIDE 1 MG/ML
.3-.5 INJECTION, SOLUTION INTRAMUSCULAR; INTRAVENOUS; SUBCUTANEOUS EVERY 10 MIN PRN
Status: DISCONTINUED | OUTPATIENT
Start: 2017-07-12 | End: 2017-07-12 | Stop reason: HOSPADM

## 2017-07-12 RX ORDER — LIDOCAINE HYDROCHLORIDE 20 MG/ML
INJECTION, SOLUTION INFILTRATION; PERINEURAL PRN
Status: DISCONTINUED | OUTPATIENT
Start: 2017-07-12 | End: 2017-07-12

## 2017-07-12 RX ORDER — SODIUM CHLORIDE, SODIUM LACTATE, POTASSIUM CHLORIDE, CALCIUM CHLORIDE 600; 310; 30; 20 MG/100ML; MG/100ML; MG/100ML; MG/100ML
INJECTION, SOLUTION INTRAVENOUS CONTINUOUS
Status: DISCONTINUED | OUTPATIENT
Start: 2017-07-12 | End: 2017-07-12 | Stop reason: HOSPADM

## 2017-07-12 RX ORDER — PROPOFOL 10 MG/ML
INJECTION, EMULSION INTRAVENOUS PRN
Status: DISCONTINUED | OUTPATIENT
Start: 2017-07-12 | End: 2017-07-12

## 2017-07-12 RX ORDER — ONDANSETRON 4 MG/1
4 TABLET, ORALLY DISINTEGRATING ORAL EVERY 30 MIN PRN
Status: DISCONTINUED | OUTPATIENT
Start: 2017-07-12 | End: 2017-07-12 | Stop reason: HOSPADM

## 2017-07-12 RX ORDER — SODIUM CHLORIDE, SODIUM LACTATE, POTASSIUM CHLORIDE, CALCIUM CHLORIDE 600; 310; 30; 20 MG/100ML; MG/100ML; MG/100ML; MG/100ML
INJECTION, SOLUTION INTRAVENOUS CONTINUOUS PRN
Status: DISCONTINUED | OUTPATIENT
Start: 2017-07-12 | End: 2017-07-12

## 2017-07-12 RX ORDER — FENTANYL CITRATE 50 UG/ML
25-50 INJECTION, SOLUTION INTRAMUSCULAR; INTRAVENOUS
Status: DISCONTINUED | OUTPATIENT
Start: 2017-07-12 | End: 2017-07-12 | Stop reason: HOSPADM

## 2017-07-12 RX ORDER — HYDROCODONE BITARTRATE AND ACETAMINOPHEN 5; 325 MG/1; MG/1
1-2 TABLET ORAL
Status: DISCONTINUED | OUTPATIENT
Start: 2017-07-12 | End: 2017-07-12 | Stop reason: HOSPADM

## 2017-07-12 RX ORDER — FENTANYL CITRATE 50 UG/ML
INJECTION, SOLUTION INTRAMUSCULAR; INTRAVENOUS PRN
Status: DISCONTINUED | OUTPATIENT
Start: 2017-07-12 | End: 2017-07-12

## 2017-07-12 RX ORDER — IBUPROFEN 600 MG/1
600 TABLET, FILM COATED ORAL EVERY 6 HOURS PRN
Qty: 30 TABLET | Refills: 0 | Status: SHIPPED | OUTPATIENT
Start: 2017-07-12 | End: 2021-07-09

## 2017-07-12 RX ORDER — ONDANSETRON 4 MG/1
4 TABLET, ORALLY DISINTEGRATING ORAL
Status: DISCONTINUED | OUTPATIENT
Start: 2017-07-12 | End: 2017-07-12 | Stop reason: HOSPADM

## 2017-07-12 RX ORDER — MEPERIDINE HYDROCHLORIDE 25 MG/ML
12.5 INJECTION INTRAMUSCULAR; INTRAVENOUS; SUBCUTANEOUS
Status: DISCONTINUED | OUTPATIENT
Start: 2017-07-12 | End: 2017-07-12 | Stop reason: HOSPADM

## 2017-07-12 RX ORDER — ONDANSETRON 2 MG/ML
4 INJECTION INTRAMUSCULAR; INTRAVENOUS EVERY 30 MIN PRN
Status: DISCONTINUED | OUTPATIENT
Start: 2017-07-12 | End: 2017-07-12 | Stop reason: HOSPADM

## 2017-07-12 RX ORDER — LIDOCAINE 40 MG/G
CREAM TOPICAL
Status: DISCONTINUED | OUTPATIENT
Start: 2017-07-12 | End: 2017-07-12 | Stop reason: HOSPADM

## 2017-07-12 RX ORDER — IBUPROFEN 600 MG/1
600 TABLET, FILM COATED ORAL
Status: COMPLETED | OUTPATIENT
Start: 2017-07-12 | End: 2017-07-12

## 2017-07-12 RX ORDER — LIDOCAINE HYDROCHLORIDE 10 MG/ML
INJECTION, SOLUTION INFILTRATION; PERINEURAL PRN
Status: DISCONTINUED | OUTPATIENT
Start: 2017-07-12 | End: 2017-07-12 | Stop reason: HOSPADM

## 2017-07-12 RX ORDER — ONDANSETRON 2 MG/ML
INJECTION INTRAMUSCULAR; INTRAVENOUS PRN
Status: DISCONTINUED | OUTPATIENT
Start: 2017-07-12 | End: 2017-07-12

## 2017-07-12 RX ORDER — DEXAMETHASONE SODIUM PHOSPHATE 4 MG/ML
INJECTION, SOLUTION INTRA-ARTICULAR; INTRALESIONAL; INTRAMUSCULAR; INTRAVENOUS; SOFT TISSUE PRN
Status: DISCONTINUED | OUTPATIENT
Start: 2017-07-12 | End: 2017-07-12

## 2017-07-12 RX ORDER — NALOXONE HYDROCHLORIDE 0.4 MG/ML
.1-.4 INJECTION, SOLUTION INTRAMUSCULAR; INTRAVENOUS; SUBCUTANEOUS
Status: DISCONTINUED | OUTPATIENT
Start: 2017-07-12 | End: 2017-07-12 | Stop reason: HOSPADM

## 2017-07-12 RX ADMIN — IBUPROFEN 600 MG: 600 TABLET ORAL at 12:01

## 2017-07-12 RX ADMIN — ONDANSETRON 4 MG: 2 INJECTION INTRAMUSCULAR; INTRAVENOUS at 10:34

## 2017-07-12 RX ADMIN — LEVONORGESTREL 1 EACH: 52 INTRAUTERINE DEVICE INTRAUTERINE at 10:41

## 2017-07-12 RX ADMIN — PROPOFOL 20 MG: 10 INJECTION, EMULSION INTRAVENOUS at 10:24

## 2017-07-12 RX ADMIN — FENTANYL CITRATE 100 MCG: 50 INJECTION, SOLUTION INTRAMUSCULAR; INTRAVENOUS at 10:23

## 2017-07-12 RX ADMIN — LIDOCAINE HYDROCHLORIDE 40 MG: 20 INJECTION, SOLUTION INFILTRATION; PERINEURAL at 10:23

## 2017-07-12 RX ADMIN — DEXAMETHASONE SODIUM PHOSPHATE 4 MG: 4 INJECTION, SOLUTION INTRAMUSCULAR; INTRAVENOUS at 10:39

## 2017-07-12 RX ADMIN — LIDOCAINE HYDROCHLORIDE 10 ML: 10 INJECTION, SOLUTION INFILTRATION; PERINEURAL at 10:41

## 2017-07-12 RX ADMIN — PROPOFOL 30 MG: 10 INJECTION, EMULSION INTRAVENOUS at 10:23

## 2017-07-12 RX ADMIN — PROPOFOL 10 MG: 10 INJECTION, EMULSION INTRAVENOUS at 10:29

## 2017-07-12 RX ADMIN — SODIUM CHLORIDE, POTASSIUM CHLORIDE, SODIUM LACTATE AND CALCIUM CHLORIDE: 600; 310; 30; 20 INJECTION, SOLUTION INTRAVENOUS at 10:18

## 2017-07-12 RX ADMIN — FENTANYL CITRATE 50 MCG: 50 INJECTION, SOLUTION INTRAMUSCULAR; INTRAVENOUS at 10:43

## 2017-07-12 RX ADMIN — MIDAZOLAM HYDROCHLORIDE 2 MG: 1 INJECTION, SOLUTION INTRAMUSCULAR; INTRAVENOUS at 10:16

## 2017-07-12 RX ADMIN — PROPOFOL 125 MCG/KG/MIN: 10 INJECTION, EMULSION INTRAVENOUS at 10:23

## 2017-07-12 NOTE — ANESTHESIA PREPROCEDURE EVALUATION
Anesthesia Evaluation     . Pt has had prior anesthetic. Type: General, MAC and Regional    No history of anesthetic complications          ROS/MED HX    ENT/Pulmonary:  - neg pulmonary ROS     Neurologic:  - neg neurologic ROS     Cardiovascular:        (-) hypertension (Pt denies)   METS/Exercise Tolerance:  >4 METS   Hematologic:  - neg hematologic  ROS       Musculoskeletal:  - neg musculoskeletal ROS       GI/Hepatic:  - neg GI/hepatic ROS       Renal/Genitourinary:  - ROS Renal section negative       Endo:     (+) Obesity, .   (-) Type II DM (Pt denies)   Psychiatric:     (+) psychiatric history depression      Infectious Disease:         Malignancy:      - no malignancy   Other:    (+) No chance of pregnancy no H/O Chronic Pain,                   Physical Exam  Normal systems: dental    Airway   Mallampati: II  TM distance: >3 FB  Neck ROM: full    Dental     Cardiovascular   Rhythm and rate: regular and normal      Pulmonary    breath sounds clear to auscultation                    Anesthesia Plan      History & Physical Review  History and physical reviewed and following examination; no interval change.    ASA Status:  2 .    NPO Status:  > 8 hours    Plan for MAC (Discussed GA backup) with Intravenous induction. Maintenance will be Inhalation.  Reason for MAC:  Other - see comments (Difficulty with visualization in Gyn office)  PONV prophylaxis:  Ondansetron (or other 5HT-3) and Dexamethasone or Solumedrol       Postoperative Care  Postoperative pain management:  IV analgesics.      Consents  Anesthetic plan, risks, benefits and alternatives discussed with:  Patient.  Use of blood products discussed: Yes.   Use of blood products discussed with Patient.  Consented to blood products.  .                          .

## 2017-07-12 NOTE — IP AVS SNAPSHOT
UR PREOP/PHASE II    [address]    [place] [address]    Phone:  [phone]                                       After Visit Summary   7/12/2017    Deisi Miner    MRN: [id number]           After Visit Summary Signature Page     I have received my discharge instructions, and my questions have been answered. I have discussed any challenges I see with this plan with the nurse or doctor.    ..........................................................................................................................................  Patient/Patient Representative Signature      ..........................................................................................................................................  Patient Representative Print Name and Relationship to Patient    ..................................................               ................................................  Date                                            Time    ..........................................................................................................................................  Reviewed by Signature/Title    ...................................................              ..............................................  Date                                                            Time

## 2017-07-12 NOTE — BRIEF OP NOTE
[facility] OB/GYN Brief Operative Note    Pre-operative diagnosis: Abnormal uterine bleeding    Post-operative diagnosis: Same  S/p below procedure   Procedure: Procedure(s):  ENDOMETRIAL SAMPLING (BIOPSY)  INSERT INTRAUTERINE DEVICE     Surgeon: Dr. Sultana   Assistant(s): Dariela Isaacs MD, MPH G4  Reece Sharma MS3  Larissa Crespo MD G1      Anesthesia: Local anesthesia and MAC     Estimated blood loss: 5mL   Total IV fluids: 500 mL, crystalloid   Blood transfusion: No transfusion was given during surgery   Total urine output: Not collected   Drains: None   Specimens: Endometrial tissue    Implants: Mirena IUD   Complications: None apparent    Condition: Patient taken to recovery in stable condition.     Findings: EUA unable to palpate cervix or uterus due to body habitus. Patient had redundant vaginal tissue. Cervix visualized with extra large graves, and no abnormalities noted. Dilated with michelle dilators to 2. Difficult to dilate due to acute angle of uterus and redundant vaginal tissue not allowing correct angle to be obtained.  When trying to dilate, fibrous band felt in cervix at internal os.  Used os finder to dilate further. Uterus sounded to 10 cm with endometrial pipelle. Pipelle returned moderate amount of endometrial tissue on two passes. IUD placed and strings cut to 4cm. Tenaculum spot hemostatic.     Mirena IUD information  Lot number: [id number]  Expiration date 1/2020      Larissa Crespo MD PGY1  7/12/2017 11:01 AM  OB/GYN Resident

## 2017-07-12 NOTE — DISCHARGE INSTRUCTIONS
Same-Day Surgery   Adult Discharge Orders & Instructions     For 24 hours after surgery:  1. Get plenty of rest.  A responsible adult must stay with you for at least 24 hours after you leave the hospital.   2. Pain medication can slow your reflexes. Do not drive or use heavy equipment.  If you have weakness or tingling, don't drive or use heavy equipment until this feeling goes away.  3. Mixing alcohol and pain medication can cause dizziness and slow your breathing. It can even be fatal. Do not drink alcohol while taking pain medication.  4. Avoid strenuous or risky activities.  Ask for help when climbing stairs.   5. You may feel lightheaded.  If so, sit for a few minutes before standing.  Have someone help you get up.   6. If you have nausea (feel sick to your stomach), drink only clear liquids such as apple juice, ginger ale, broth or 7-Up.  Rest may also help.  Be sure to drink enough fluids.  Move to a regular diet as you feel able. Take pain medications with a small amount of solid food, such as toast or crackers, to avoid nausea.   7. A slight fever is normal. Call the doctor if your fever is over 100 F (37.7 C) (taken under the tongue) or lasts longer than 24 hours.  8. You may have a dry mouth, muscle aches, trouble sleeping or a sore throat.  These symptoms should go away after 24 hours.  9. Do not make important or legal decisions.   Pain Management:      1. Take pain medication (if prescribed) for pain as directed by your physician.        2. WARNING: If the pain medication you have been prescribed contains Tylenol  (acetaminophen), DO NOT take additional doses of Tylenol (acetaminophen).     Call your doctor for any of the followin.  Signs of infection (fever, growing tenderness at the surgery site, severe pain, a large amount of drainage or bleeding, foul-smelling drainage, redness, swelling).    2.  It has been over 8 to 10 hours since surgery and you are still not able to urinate (pee).    3.   Headache for over 24 hours.    4.  Numbness, tingling or weakness the day after surgery (if you had spinal anesthesia).  To contact a doctor, call _____________________________________ or:      [phone] and ask for the Resident On Call for:          __________________________________________ (answered 24 hours a day)      Emergency Department:  [place] [facility]: [phone]  [place] [facility]: [phone]               Rev. 10/2014

## 2017-07-12 NOTE — OP NOTE
[facility] Gynecology Operative Note    Deisi Miner   MRN: [id number]   : 1975    Date of Service: 2017     Pre-procedure Diagnosis:    AUB- menorrhagia  Thickened EMS on US  Morbid obesity, BMI 46  Inability to adequately visualize cervix in office, unable to complete biopsy  Desires Mirena IUD placement    Post-procedure Diagnosis:    Same s/p below stated procedure  Stenotic cervix with scar tissue band at internal os  Sharply anteverted uterus  Mirena IUD in place    Procedure: Exam under anesthesia, dilation of cervix, endometrial sampling, Mirena IUD placement     Surgeon:  Dr. Sultana    Assistant: Dariela Isaacs MD, MPH OB/GYN G4, Larissa Crespo MD G4, Reece Sharma MS3    Anesthesia: MAC with paracervical block using 1% lidocaine local anesthetic    Complications: None apparent    EBL: 5  mL    Fluids: 500 mL crystalloid IVF    Urine Output: none, voided on call to OR    Indications: Deisi Miner is a 42 year old female  who presented with AUB and US showing thickened EMS. Given her body habitus, endometrial sampling was recommended. This was attempted in office, however, given body habitus provider was unable to visualize cervix even with long graves speculum and continued trying for up to 10 minutes. Patient was agreeable to EMB and Mirena IUD placement in OR due to this. Risks, benefits and alternatives to above stated procedure were discussed. Patient desired to proceed and written informed consent was obtained prior to proceeding.     Findings:  EUA unable to palpate cervix or uterus due to body habitus. Patient had a lot of redundant vaginal tissue making visualization difficult. Cervix visualized with no abnormalities. Cervix was not dilated & stenotic, it was dilated with much difficulty using os finder as hegar dilators could not pass. Fibrous band felt at internal os when trying to dilate. Cervical canal with acute anteverted angle making nonbendable instruments were  unable to pass through canal. Uterus sounded with endometrial pipelle to 10 cm. Pipelle returned a moderate amount of endometrial tissue on two passes. Mirena IUD placed and strings cut to 4cm from external os. Tenaculum sites hemostatic at end of case.      Specimens: endometrial currettings    Procedure:   Patient was taken to the OR where she was placed under MAC anesthesia without difficulty. SCDs were placed prior to anesthesia. No antibiotics were indicated or given. Once patient was comfortable she was positioned in the dorsal lithotomy position with her legs up in stirrups.  Exam under anesthesia was performed with findings as above. She was then prepped and draped in the normal sterile fashion.  A presurgical pause was completed and the proper patient and procedure were identified.  A sterile speculum was placed in the vagina and the cervix was not easily visualized. A single toothed tenaculum was placed on the anterior lip at the 12 o'clock position. The 4 and 8 o'clock positions of the cervicovaginal junction were then injected with 5mL of  1% lidocaine on each side. Given poor visualization, the speculum was removed and a longer graves speculum placed. Visualization was improved moderately; however, right angle retractors were still needed to retract vaginal tissue on each side. The cervix was closed and stenotic. The hegar dilators would not pass through canal given acute anteverted angle of cervical canal. The tenaculum was removed from the anterior lip and placed on posterior lip of cervix to assist in further straightening out the uterus. The os finder was finally able to pass through cervical canal with some difficulty to dilate the cervix. The endometrial pipelle was then inserted through os and uterus sounded to 10 cm. Moderate amount of endometrial tissue was returned on two passes. Each time an instrument was removed from the cervix, the os finder had to be used again to open the internal os.  There was fibrous, stenotic tissue noted at the internal os. The Mirena IUD was loaded and the applicator inserted through cervix. The applicator was advanced to the fundus, retracted 2 cm and the IUD deployed. After a 10 second pause, the IUD was then advanced to the fundus and the applicator removed. IUD strings were trimmed ~ 4 cm from external os and swept behind the cervix. No evidence of the IUD body in the cervical canal noted. The tenaculum was removed and sites noted to be oozing. Good hemostasis was achieved with pressure held using sponge stick. The speculum was then removed. The patient tolerated the procedure well and was taken to the PACU for recovery in stable condition. Instrument, needle and sponge counts correct x2. Dr. Sultana was present for the entire procedure.     Dariela Isaacs MD, MPH  OB/GYN Resident G4  7/12/2017 12:32 PM

## 2017-07-12 NOTE — ANESTHESIA POSTPROCEDURE EVALUATION
Patient: Deisi Miner    Procedure(s):  Endometrial Biopsy with Mirena Intrauterine Device Placement - Wound Class: II-Clean Contaminated   - Wound Class: II-Clean Contaminated    Diagnosis:Menorrhagia with Thickened Endometrium, Unable to Place in Office  Diagnosis Additional Information: No value filed.    Anesthesia Type:  MAC    Note:  Anesthesia Post Evaluation    Patient location during evaluation: Phase 2  Patient participation: Able to fully participate in evaluation  Level of consciousness: awake and alert  Pain management: satisfactory to patient  Airway patency: patent  Cardiovascular status: acceptable  Respiratory status: acceptable  Hydration status: acceptable  PONV: none     Anesthetic complications: None          Last vitals:  Vitals:    07/12/17 1115 07/12/17 1123 07/12/17 1130   BP: (!) 89/63 106/75 113/77   Resp:  18    Temp:      SpO2: 95% 96% 96%         Electronically Signed By: Ailyn Hernandez MD  July 12, 2017  12:29 PM

## 2017-07-12 NOTE — IP AVS SNAPSHOT
MRN:[id number]                      After Visit Summary   7/12/2017    Deisi Miner    MRN: [id number]           Thank you!     Thank you for choosing [facility] for your care. Our goal is always to provide you with excellent care. Hearing back from our patients is one way we can continue to improve our services. Please take a few minutes to complete the written survey that you may receive in the mail after you visit with us. Thank you!        Patient Information     Date Of Birth          1975        About your hospital stay     You were admitted on:  July 12, 2017 You last received care in the:  [facility] PREOP/PHASE II    You were discharged on:  July 12, 2017       Who to Call     For medical emergencies, please call 911.  For non-urgent questions about your medical care, please call your primary care provider or clinic, [phone]  For questions related to your surgery, please call your surgery clinic        Attending Provider     Provider Specialty    Samantha Sultana MD OB/Gyn       Primary Care Provider Office Phone # Fax #    Alejandro Jasiel Gifford [phone]      After Care Instructions     Discharge Instructions       Resume pre procedure diet            Discharge Instructions       Patient may return to work POD  1 or when able            Discharge Instructions       You may take 400-600mg ibuprofen every 6 hours for pain control. You may also take tylenol 650mg every 4 hours as needed.  If your pain worsens or your nausea/vomiting is not resolved with pain medications return to ED for evaluation or go to urgent care if able. You should make follow up appointment with your primary doctor or OB/GYN in 2 weeks if you do not already have an appointment scheduled. Call clinic sooner if you are not feeling well, have questions or other concerns.            Discharge Instructions       Patient to arrange follow up appointment in 2  weeks            Discharge  Instructions       Call the clinic or return to the ED if you have any of the following:   - Temperature greater than 100.4F  - Pain not controlled by pain medications  - Uncontrolled nausea/vomiting  - Foul-smelling vaginal discharge  - Vaginal bleeding soaking 1 pad per hour for 2 hours in a row            Ice to affected area       PRN as tolerated            No alcohol       NO ALCOHOL for 24 hours post procedure            No driving or operating machinery       No driving or operating machinery until day after procedure            Shower        Shower on Post-op day  0.   DO NOT take a bath                  Your next 10 appointments already scheduled     Jul 25, 2017  3:00 PM CDT   Return Visit with Marcela Dobson, PhD MARJORIE   [facility]  ([facility])    [facility]  [address]  [address]  [facility] [address]   [phone]            Aug 09, 2017  3:00 PM CDT   Return Visit with Marcela Dobson, PhD MARJORIE   [facility]  ([facility])    [facility]  [address]  [address]  [facility] [address]   [phone]            Aug 22, 2017  3:00 PM CDT   Return Visit with Marcela Dobsno, PhD    [facility]  ([facility])    [facility]  [address]  [address]  [facility] [address] [phone]            Aug 24, 2017  3:30 PM CDT   (Arrive by 3:15 PM)   Return Weight Management Visit with Teri Ramirez PA-C   [facility] ([facility])    [address] [place] [address]  [address] [facility] [address]   [phone]            Sep 06, 2017  3:00 PM CDT   Return Visit with Marcela Dobson, PhD    [facility]  ([facility])    [facility]  [address]  [address] [facility] [address] [phone]             Sep 19, 2017  3:00 PM CDT   Return Visit with Marcela Dobson, PhD    [facility]  ([facility])    [facility]  [address], [address]  [address]  [facility] [address]   [phone]            Oct 03, 2017  3:00 PM CDT   Return Visit with Marcela Dobson, PhD    [facility]  ([facility])    [facility]  [address]  [address]  [facility] [address]   [phone]            Oct 17, 2017  3:00 PM CDT   Return Visit with Marcela Dobson, PhD    [facility]  ([facility])    [facility]  [address]  [address]  [facility] [address]   [phone]            Nov 01, 2017  3:00 PM CDT   Return Visit with Marcela Dobson, PhD    [facility]  ([facility])    [facility]  [address]  [address]  [facility] [address]   [phone]            Nov 14, 2017  3:00 PM CST   Return Visit with Marcela Dobson, PhD    [facility]  ([facility])    [facility]  [address]  [address]  [facility] [address]   [phone]              Further instructions from your care team       Same-Day Surgery   Adult Discharge Orders & Instructions     For 24 hours after surgery:  1. Get plenty of rest.  A responsible adult must stay with you for at least 24 hours after you leave the hospital.   2. Pain medication can slow your reflexes. Do not drive or use heavy equipment.  If you have weakness or tingling, don't drive or use heavy equipment until this feeling goes away.  3. Mixing alcohol and pain medication can cause dizziness and slow your breathing. It can even be fatal. Do not drink alcohol while taking pain medication.  4. Avoid strenuous or risky activities.  Ask for help when climbing stairs.   5. You may feel  lightheaded.  If so, sit for a few minutes before standing.  Have someone help you get up.   6. If you have nausea (feel sick to your stomach), drink only clear liquids such as apple juice, ginger ale, broth or 7-Up.  Rest may also help.  Be sure to drink enough fluids.  Move to a regular diet as you feel able. Take pain medications with a small amount of solid food, such as toast or crackers, to avoid nausea.   7. A slight fever is normal. Call the doctor if your fever is over 100 F (37.7 C) (taken under the tongue) or lasts longer than 24 hours.  8. You may have a dry mouth, muscle aches, trouble sleeping or a sore throat.  These symptoms should go away after 24 hours.  9. Do not make important or legal decisions.   Pain Management:      1. Take pain medication (if prescribed) for pain as directed by your physician.        2. WARNING: If the pain medication you have been prescribed contains Tylenol  (acetaminophen), DO NOT take additional doses of Tylenol (acetaminophen).     Call your doctor for any of the followin.  Signs of infection (fever, growing tenderness at the surgery site, severe pain, a large amount of drainage or bleeding, foul-smelling drainage, redness, swelling).    2.  It has been over 8 to 10 hours since surgery and you are still not able to urinate (pee).    3.  Headache for over 24 hours.    4.  Numbness, tingling or weakness the day after surgery (if you had spinal anesthesia).  To contact a doctor, call _____________________________________ or:      [phone] and ask for the Resident On Call for:          __________________________________________ (answered 24 hours a day)      Emergency Department:  [facility]: [phone]  [facility]: [phone]               Rev. 10/2014       Pending Results     Date and Time Order Name Status Description    2017 1043 Surgical pathology exam In process             Admission Information     Date &  "Time Provider Department Dept. Phone    7/12/2017 Samantha Sultana MD  PREOP/PHASE -[phone]      Your Vitals Were     Blood Pressure Temperature Respirations Height Weight Last Period    106/75 98.4  F (36.9  C) (Oral) 18 1.575 m (5' 2\") 114.7 kg (252 lb 13.9 oz) 06/17/2017    Pulse Oximetry BMI (Body Mass Index)                96% 46.25 kg/m2          Mobile-XL Information     Mobile-XL gives you secure access to your electronic health record. If you see a primary care provider, you can also send messages to your care team and make appointments. If you have questions, please call your primary care clinic.  If you do not have a primary care provider, please call [phone] and they will assist you.        Care EveryWhere ID     This is your Care EveryWhere ID. This could be used by other organizations to access your [facility] medical records  [id number]        Equal Access to Services     NAVYA HARRY AH: Hadii jose alfredo muellero Sotodd, waaxda lujudithadaha, qaybta kaalmada gila, lilo schafer . So [facility] [place] [phone].    ATENCIÓN: Si habla español, tiene a little disposición servicios gratuitos de asistencia lingüística. Llame al [phone].    We comply with applicable federal civil rights laws and [place] laws. We do not discriminate on the basis of race, color, national origin, age, disability sex, sexual orientation or gender identity.               Review of your medicines      START taking        Dose / Directions    ibuprofen 600 MG tablet   Commonly known as:  ADVIL/MOTRIN   Used for:  Encounter for insertion of mirena IUD        Dose:  600 mg   Take 1 tablet (600 mg) by mouth every 6 hours as needed for pain (mild)   Quantity:  30 tablet   Refills:  0         CONTINUE these medicines which have NOT CHANGED        Dose / Directions    cyanocobalamin 1000 MCG/ML injection   Commonly known as:  VITAMIN B12   Used for:  Bariatric surgery status        Dose:  1 mL   Inject 1 " "mL (1,000 mcg) Subcutaneous every 30 days   Quantity:  1 mL   Refills:  11       phentermine 37.5 MG tablet   Commonly known as:  ADIPEX-P   Used for:  Morbid obesity, unspecified obesity type (H)        Take 1/2 tab in the morning and 1/2 tab at lunch   Quantity:  30 tablet   Refills:  3       sertraline 100 MG tablet   Commonly known as:  ZOLOFT   Used for:  DENISE (generalized anxiety disorder)        Dose:  100 mg   Take 1 tablet (100 mg) by mouth daily   Quantity:  90 tablet   Refills:  3       Tuberculin-Allergy Syringes 25G X 5/8\" 1 ML Misc   Commonly known as:  B-D TB SYRINGE   Used for:  Bariatric surgery status        Use one every 30 days for cyanocobalamin (VITAMIN B12) injection.   Quantity:  12 each   Refills:  0       VITAMIN D (CHOLECALCIFEROL) PO        Take by mouth daily   Refills:  0            Where to get your medicines      These medications were sent to [facility] [address]  [address] [address]     Phone:  [phone]     ibuprofen 600 MG tablet                Protect others around you: Learn how to safely use, store and throw away your medicines at www.disposemymeds.org.             Medication List: This is a list of all your medications and when to take them. Check marks below indicate your daily home schedule. Keep this list as a reference.      Medications           Morning Afternoon Evening Bedtime As Needed    cyanocobalamin 1000 MCG/ML injection   Commonly known as:  VITAMIN B12   Inject 1 mL (1,000 mcg) Subcutaneous every 30 days                                ibuprofen 600 MG tablet   Commonly known as:  ADVIL/MOTRIN   Take 1 tablet (600 mg) by mouth every 6 hours as needed for pain (mild)                                phentermine 37.5 MG tablet   Commonly known as:  ADIPEX-P   Take 1/2 tab in the morning and 1/2 tab at lunch                                sertraline 100 MG tablet   Commonly known as:  ZOLOFT   Take 1 " "tablet (100 mg) by mouth daily                                Tuberculin-Allergy Syringes 25G X 5/8\" 1 ML Misc   Commonly known as:  B-D TB SYRINGE   Use one every 30 days for cyanocobalamin (VITAMIN B12) injection.                                VITAMIN D (CHOLECALCIFEROL) PO   Take by mouth daily                                  "

## 2017-07-12 NOTE — OR NURSING
Discharge instructions reviewed with patient and spouse, verbalizing understanding, given discharge medication. Patient received ibuprofen prior to discharge. Tolerating fluids and crackers. Up to bathroom to void, patient states she has scant vaginal discharge.

## 2017-07-12 NOTE — ANESTHESIA CARE TRANSFER NOTE
Patient: Deisi Miner    Procedure(s):  Endometrial Biopsy with Mirena Intrauterine Device Placement - Wound Class: II-Clean Contaminated   - Wound Class: II-Clean Contaminated    Diagnosis: Menorrhagia with Thickened Endometrium, Unable to Place in Office  Diagnosis Additional Information: No value filed.    Anesthesia Type:   MAC     Note:  Airway :Room Air  Patient transferred to:Phase II  Comments: To phase 2.  Report to RN.  VSS  Sat 97%, HR 94, 110/83, RR 20      Vitals: (Last set prior to Anesthesia Care Transfer)    CRNA VITALS  7/12/2017 1036 - 7/12/2017 1113      7/12/2017             Pulse: 105    SpO2: 95 %    Resp Rate (set): 10                Electronically Signed By: DARIO Mata CRNA  July 12, 2017  11:13 AM

## 2017-07-14 LAB — COPATH REPORT: NORMAL

## 2017-08-09 ENCOUNTER — OFFICE VISIT (OUTPATIENT)
Dept: PSYCHOLOGY | Facility: CLINIC | Age: 42
End: 2017-08-09
Payer: COMMERCIAL

## 2017-08-09 DIAGNOSIS — F41.1 GAD (GENERALIZED ANXIETY DISORDER): Primary | ICD-10-CM
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
## 2017-08-09 NOTE — MR AVS SNAPSHOT
After Visit Summary   8/9/2017    Deisi Miner    MRN: [id number]           Patient Information     Date Of Birth          1975        Visit Information        Provider Department      8/9/2017 3:00 PM Marcela Dobson, PhD  [facility]         Today's Diagnoses     DENISE (generalized anxiety disorder)    -  1       Follow-ups after your visit        Your next 10 appointments already scheduled     Aug 22, 2017  3:00 PM CDT   Return Visit with Marcela Dobson, PhD MARJORIE   [facility]    [facility]  [address]rd [address]  [facility] [address] [phone]            Aug 24, 2017  3:30 PM CDT   (Arrive by 3:15 PM)   Return Weight Management Visit with Teri Ramirez PA-C   [facility] ([facility])    [address] [place] [address]  4th [facility] [address]   [phone]            Sep 06, 2017  3:00 PM CDT   Return Visit with Marcela Dobson, PhD    [facility]    [facility]  [address]rd Flr, [address]  [address]  [facility] [address] [phone]            Sep 19, 2017  3:00 PM CDT   Return Visit with Marcela Dobson, PhD    [facility]    [facility]  [address]rd Flr, [address]  [address]  [facility] [address] [phone]            Oct 03, 2017  3:00 PM CDT   Return Visit with Marcela Dobson, PhD    [facility]    [facility]  [address]rd Flr, [address]  [address]  [facility] [address] [phone]            Oct 17, 2017  3:00 PM CDT   Return Visit with Marcela Dobson, PhD    [facility]    [facility]  [address]rd Flr, [address]  [address]  [facility] [address]    [phone]            Nov 01, 2017  3:00 PM CDT   Return Visit with Marcela Dobson, PhD MARJORIE   [facility]  ([facility])    [facility]  [address]rd Flr, [address]  [address] [facility] [address] [phone]            Nov 14, 2017  3:00 PM CST   Return Visit with Marcela Dobson, PhD MARJORIE   [facility]  ([facility])    [facility]  [address]rd Fl[address], [address]  [address] [facility] [id number] [phone]              Who to contact     Please call your clinic at [phone] to:    Ask questions about your health    Make or cancel appointments    Discuss your medicines    Learn about your test results    Speak to your doctor   If you have compliments or concerns about an experience at your clinic, or if you wish to file a complaint, please contact [facility] Patient Relations at [phone] or email us at [email or web address]         Additional Information About Your Visit        Wag[employer] Information     [employer] gives you secure access to your electronic health record. If you see a primary care provider, you can also send messages to your care team and make appointments. If you have questions, please call your primary care clinic.  If you do not have a primary care provider, please call [phone] and they will assist you.      [employer] is an electronic gateway that provides easy, online access to your medical records. With [employer], you can request a clinic appointment, read your test results, renew a prescription or communicate with your care team.     To access your existing account, please contact your [facility] or call [phone] for assistance.        Care EveryWhere ID     This is your Care EveryWhere ID. This could be used by other organizations to access your [place] medical records  [id number]         Blood Pressure from Last 3  Encounters:   07/12/17 (!) 129/91   07/10/17 120/78   07/05/17 112/90    Weight from Last 3 Encounters:   07/12/17 114.7 kg (252 lb 13.9 oz)   07/10/17 115.3 kg (254 lb 3.2 oz)   07/05/17 114.3 kg (252 lb)              We Performed the Following     Individual Psychotherapy - Psychotherapy with pt and/or family present  60 mins [53+ mins] (91390)        Primary Care Provider Office Phone # Fax #    Alejandro Jasiel Gifford [phone]       [address] [facility] [address]  [facility] [address]        Equal Access to Services     RANDALL HARRY : Cassia Martinez, wajerzy mitchell, leandro kaalmada gila, lilo schafer . So [facility] [phone].    ATENCIÓN: Si habla español, tiene a little disposición servicios gratuitos de asistencia lingüística. Llame al [phone].    We comply with applicable federal civil rights laws and [place] laws. We do not discriminate on the basis of race, color, national origin, age, disability sex, sexual orientation or gender identity.            Thank you!     Thank you for choosing [facility]   for your care. Our goal is always to provide you with excellent care. Hearing back from our patients is one way we can continue to improve our services. Please take a few minutes to complete the written survey that you may receive in the mail after your visit with us. Thank you!             Your Updated Medication List - Protect others around you: Learn how to safely use, store and throw away your medicines at www.disposemymeds.org.          This list is accurate as of: 8/9/17  4:33 PM.  Always use your most recent med list.                   Brand Name Dispense Instructions for use Diagnosis    cyanocobalamin 1000 MCG/ML injection    VITAMIN B12    1 mL    Inject 1 mL (1,000 mcg) Subcutaneous every 30 days    Bariatric surgery status       ibuprofen 600 MG tablet    ADVIL/MOTRIN    30 tablet    Take 1 tablet (600 mg) by mouth  "every 6 hours as needed for pain (mild)    Encounter for insertion of mirena IUD       phentermine 37.5 MG tablet    ADIPEX-P    30 tablet    Take 1/2 tab in the morning and 1/2 tab at lunch    Morbid obesity, unspecified obesity type (H)       sertraline 100 MG tablet    ZOLOFT    90 tablet    Take 1 tablet (100 mg) by mouth daily    DENISE (generalized anxiety disorder)       Tuberculin-Allergy Syringes 25G X 5/8\" 1 ML Misc    B-D TB SYRINGE    12 each    Use one every 30 days for cyanocobalamin (VITAMIN B12) injection.    Bariatric surgery status       VITAMIN D (CHOLECALCIFEROL) PO      Take by mouth daily          "

## 2017-11-08 ENCOUNTER — OFFICE VISIT (OUTPATIENT)
Dept: PSYCHOLOGY | Facility: CLINIC | Age: 42
End: 2017-11-08
Payer: COMMERCIAL

## 2017-11-08 DIAGNOSIS — F41.1 GAD (GENERALIZED ANXIETY DISORDER): Primary | ICD-10-CM

## 2017-11-08 NOTE — MR AVS SNAPSHOT
After Visit Summary   11/8/2017    Deisi Miner    MRN: [id number]           Patient Information     Date Of Birth          1975        Visit Information        Provider Department      11/8/2017 8:00 AM Marcela Dobson, PhD  [facility]         Today's Diagnoses     DENISE (generalized anxiety disorder)    -  1       Follow-ups after your visit        Your next 10 appointments already scheduled     Dec 08, 2017  9:00 AM CST   Return Visit with Marcela Dobson, PhD MARJORIE   [facility]  ([facility])    [place] [facility] [email or web address]  [address]rd [address], [address]  [address]  [facility] [address]   [phone]            Dec 20, 2017  8:00 AM CST   Return Visit with Marcela Dobson, PhD MARJORIE   [facility]  ([facility])    [place] [facility] [employer]  [address]rd [address]  [address]  [facility] [address] [phone]            Jayy 15, 2018  2:00 PM CST   Return Visit with Marcela Dobson, PhD    [facility]  ([facility])    [place] [employer] [facility]  [address]rd Fl[address], [address]  [address]  [facility] [address] [phone]              Who to contact     Please call your clinic at [phone] to:    Ask questions about your health    Make or cancel appointments    Discuss your medicines    Learn about your test results    Speak to your doctor   If you have compliments or concerns about an experience at your clinic, or if you wish to file a complaint, please contact [facility] Patient Relations at [phone] or email us at [email or web address]         Additional Information About Your Visit        MyChart Information     [employer] gives you secure access to your electronic health record. If you see a primary care provider, you can also send messages to your care team and make appointments. If you have questions, please call your  primary care clinic.  If you do not have a primary care provider, please call [phone] and they will assist you.      [employer] is an electronic gateway that provides easy, online access to your medical records. With [employer], you can request a clinic appointment, read your test results, renew a prescription or communicate with your care team.     To access your existing account, please contact your [facility] or call [phone] for assistance.        Care EveryWhere ID     This is your Care EveryWhere ID. This could be used by other organizations to access your [place] medical records  [id number]         Blood Pressure from Last 3 Encounters:   07/12/17 (!) 129/91   07/10/17 120/78   07/05/17 112/90    Weight from Last 3 Encounters:   07/12/17 114.7 kg (252 lb 13.9 oz)   07/10/17 115.3 kg (254 lb 3.2 oz)   07/05/17 114.3 kg (252 lb)              We Performed the Following     Individual Psychotherapy - Psychotherapy with pt and/or family present  60 mins [53+ mins] (36010)        Primary Care Provider Office Phone # Fax #    Alejandro Jasiel Gifford [phone]       [address] [place] [address]        Equal Access to Services     NAVYA HARRY : Hadii jose alfredo ku hadasho Soomaali, waaxda luqadaha, qaybta kaalmada adeegyada, lilo bishop hayjose m saldivar. So [facility] [phone].    ATENCIÓN: Si habla español, tiene a little disposición servicios gratuitos de asistencia lingüística. Llame al [phone].    We comply with applicable federal civil rights laws and [place] laws. We do not discriminate on the basis of race, color, national origin, age, disability, sex, sexual orientation, or gender identity.            Thank you!     Thank you for choosing [facility]   for your care. Our goal is always to provide you with excellent care. Hearing back from our patients is one way we can continue to improve our services. Please  "take a few minutes to complete the written survey that you may receive in the mail after your visit with us. Thank you!             Your Updated Medication List - Protect others around you: Learn how to safely use, store and throw away your medicines at www.disposemymeds.org.          This list is accurate as of: 11/8/17  6:52 PM.  Always use your most recent med list.                   Brand Name Dispense Instructions for use Diagnosis    cyanocobalamin 1000 MCG/ML injection    VITAMIN B12    1 mL    Inject 1 mL (1,000 mcg) Subcutaneous every 30 days    Bariatric surgery status       ibuprofen 600 MG tablet    ADVIL/MOTRIN    30 tablet    Take 1 tablet (600 mg) by mouth every 6 hours as needed for pain (mild)    Encounter for insertion of mirena IUD       phentermine 37.5 MG tablet    ADIPEX-P    30 tablet    Take 1/2 tab in the morning and 1/2 tab at lunch    Morbid obesity, unspecified obesity type (H)       sertraline 100 MG tablet    ZOLOFT    90 tablet    Take 1 tablet (100 mg) by mouth daily    DENISE (generalized anxiety disorder)       Tuberculin-Allergy Syringes 25G X 5/8\" 1 ML [facility]    B-D TB SYRINGE    12 each    Use one every 30 days for cyanocobalamin (VITAMIN B12) injection.    Bariatric surgery status       VITAMIN D (CHOLECALCIFEROL) PO      Take by mouth daily          "
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
## 2017-12-08 ENCOUNTER — OFFICE VISIT (OUTPATIENT)
Dept: PSYCHOLOGY | Facility: CLINIC | Age: 42
End: 2017-12-08
Payer: COMMERCIAL

## 2017-12-08 DIAGNOSIS — F41.1 GAD (GENERALIZED ANXIETY DISORDER): Primary | ICD-10-CM

## 2017-12-08 NOTE — MR AVS SNAPSHOT
After Visit Summary   12/8/2017    Deisi Miner    MRN: [id number]           Patient Information     Date Of Birth          1975        Visit Information        Provider Department      12/8/2017 9:00 AM Marcela Dobson, PhD  [facility]         Today's Diagnoses     DENISE (generalized anxiety disorder)    -  1       Follow-ups after your visit        Your next 10 appointments already scheduled     Dec 20, 2017  8:00 AM CST   Return Visit with Marcela Dobson PhD MARJORIE   [facility]  ([facility])    [facility]  [address]rd Flr, [address]  [address]  [facility] [address]   [phone]            Jayy 15, 2018  2:00 PM CST   Return Visit with Marcela Dobson, PhD MARJORIE   [facility]  ([facility])    [facility]  [address]  [address]  [facility] [address]   [phone]            Feb 19, 2018  9:00 AM CST   Return Visit with Marcela Dobson, PhD    [facility]  ([facility])    [facility]  [address]  [address]  [facility] [address] [phone]            Mar 21, 2018  8:00 AM CDT   Return Visit with Macrela Dobson, PhD    [facility]  ([facility])    [facility]  [address] Fl[address]  [address]  [facility] [address] [phone]              Who to contact     Please call your clinic at [phone] to:    Ask questions about your health    Make or cancel appointments    Discuss your medicines    Learn about your test results    Speak to your doctor   If you have compliments or concerns about an experience at your clinic, or if you wish to file a complaint, please contact [facility] Patient Relations at [phone] or email us at [email or web address]         Additional Information About Your  Visit        [employer] Information     [employer] gives you secure access to your electronic health record. If you see a primary care provider, you can also send messages to your care team and make appointments. If you have questions, please call your primary care clinic.  If you do not have a primary care provider, please call [phone] and they will assist you.      [employer] is an electronic gateway that provides easy, online access to your medical records. With [employer], you can request a clinic appointment, read your test results, renew a prescription or communicate with your care team.     To access your existing account, please contact your [facility] or call [phone] for assistance.        Care EveryWhere ID     This is your Care EveryWhere ID. This could be used by other organizations to access your [place] medical records  [id number]         Blood Pressure from Last 3 Encounters:   07/12/17 (!) 129/91   07/10/17 120/78   07/05/17 112/90    Weight from Last 3 Encounters:   07/12/17 114.7 kg (252 lb 13.9 oz)   07/10/17 115.3 kg (254 lb 3.2 oz)   07/05/17 114.3 kg (252 lb)              We Performed the Following     Individual Psychotherapy - Psychotherapy with pt and/or family present  60 mins [53+ mins] ([address])        Primary Care Provider Office Phone # Fax #    Alejandro Jasiel Gifford [phone]       [address] [facility] [address]  [facility] [address]        Equal Access to Services     NAVYA HARRY : Hadii aad ku hadasho Soomaali, waaxda luqadaha, qaybta kaalmada adeegyada, waxuyen dario schafer . So [facility] [phone].    ATENCIÓN: Si habla español, tiene a little disposición servicios gratuitos de asistencia lingüística. Llame al [phone].    We comply with applicable federal civil rights laws and [place] laws. We do not discriminate on the basis of race, color, national origin, age, disability, sex, sexual orientation, or gender  "identity.            Thank you!     Thank you for choosing [facility]   for your care. Our goal is always to provide you with excellent care. Hearing back from our patients is one way we can continue to improve our services. Please take a few minutes to complete the written survey that you may receive in the mail after your visit with us. Thank you!             Your Updated Medication List - Protect others around you: Learn how to safely use, store and throw away your medicines at www.disposemymeds.org.          This list is accurate as of: 12/8/17  5:07 PM.  Always use your most recent med list.                   Brand Name Dispense Instructions for use Diagnosis    cyanocobalamin 1000 MCG/ML injection    VITAMIN B12    1 mL    Inject 1 mL (1,000 mcg) Subcutaneous every 30 days    Bariatric surgery status       ibuprofen 600 MG tablet    ADVIL/MOTRIN    30 tablet    Take 1 tablet (600 mg) by mouth every 6 hours as needed for pain (mild)    Encounter for insertion of mirena IUD       phentermine 37.5 MG tablet    ADIPEX-P    30 tablet    Take 1/2 tab in the morning and 1/2 tab at lunch    Morbid obesity, unspecified obesity type (H)       sertraline 100 MG tablet    ZOLOFT    90 tablet    Take 1 tablet (100 mg) by mouth daily    DENISE (generalized anxiety disorder)       Tuberculin-Allergy Syringes 25G X 5/8\" 1 ML Misc    B-D TB SYRINGE    12 each    Use one every 30 days for cyanocobalamin (VITAMIN B12) injection.    Bariatric surgery status       VITAMIN D (CHOLECALCIFEROL) PO      Take by mouth daily          "
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
## 2017-12-20 ENCOUNTER — APPOINTMENT (OUTPATIENT)
Dept: PSYCHOLOGY | Facility: CLINIC | Age: 42
End: 2017-12-20
Payer: COMMERCIAL

## 2018-01-20 ENCOUNTER — TELEPHONE (OUTPATIENT)
Dept: FAMILY MEDICINE | Facility: CLINIC | Age: 43
End: 2018-01-20

## 2018-01-24 ENCOUNTER — OFFICE VISIT (OUTPATIENT)
Dept: CARDIOLOGY | Facility: CLINIC | Age: 43
End: 2018-01-24
Payer: COMMERCIAL

## 2018-01-24 VITALS
BODY MASS INDEX: 45.47 KG/M2 | WEIGHT: 248.6 LBS | DIASTOLIC BLOOD PRESSURE: 66 MMHG | SYSTOLIC BLOOD PRESSURE: 112 MMHG | HEART RATE: 91 BPM | OXYGEN SATURATION: 99 %

## 2018-01-24 DIAGNOSIS — R60.0 LOWER EXTREMITY EDEMA: ICD-10-CM

## 2018-01-24 DIAGNOSIS — E66.01 MORBID OBESITY (H): Primary | ICD-10-CM

## 2018-01-24 PROCEDURE — 99203 OFFICE O/P NEW LOW 30 MIN: CPT | Performed by: INTERNAL MEDICINE

## 2018-01-24 NOTE — LETTER
1/24/2018      Alejandro Gifford MD  [address]  [facility] [address]      RE: Deisi Castillon       Dear Colleague,    I had the pleasure of seeing Deisi Miner in the [facility].    Service Date: 01/24/2018      REFERRING PHYSICIAN:  The patient is self-referred.      INDICATION:  Concerns about lower extremity circulation.      HISTORY OF PRESENT ILLNESS:  Mr. Miner is a very pleasant 42-year-old female with no significant past cardiovascular history or cardiovascular risk factors who has been having lower extremity pain and edema for many months, if not years.  She is concerned that this could be related to vascular issues, as she has vascular issues in her family.  Interestingly, she has no cardiovascular risk factors, denying diabetes, hypertension, hyperlipidemia or a prior history of smoking.  She also does not have a family history of premature atherosclerotic disease.      She does not have any claudication-like symptoms but instead has constant pain in her lower extremities.  She has also noticed that she has an increased girth of her right lower extremity versus her left lower extremity.  On exam, she does have some mild bilateral edema.      She denies any other cardiovascular complaints, denying any chest pain, chest pressure, shortness of breath, orthopnea or paroxysmal nocturnal dyspnea.      Please see my separate note with a full list of her allergies, medications, past medical history, social history, family history and review of systems.      Please see my separate note with her full physical examination.      IMPRESSION AND PLAN:  Ms. Miner is a pleasant 42-year-old female with a history of morbid obesity and resulting lower extremity edema.  I suspect the patient has some venous insufficiency.  There are no risk factors to suggest arterial insufficiency, and at this time I do not believe any further cardiovascular testing is warranted.   "I have given her a referral to Dr. Barrera at [facility] to determine if venous insufficiency could be causing her symptoms.  She will follow back with me on a p.r.n. basis.     Outpatient Encounter Prescriptions as of 1/24/2018   Medication Sig Dispense Refill     ibuprofen (ADVIL/MOTRIN) 600 MG tablet Take 1 tablet (600 mg) by mouth every 6 hours as needed for pain (mild) 30 tablet 0     sertraline (ZOLOFT) 100 MG tablet Take 1 tablet (100 mg) by mouth daily 90 tablet 3     VITAMIN D, CHOLECALCIFEROL, PO Take by mouth daily       phentermine (ADIPEX-P) 37.5 MG tablet Take 1/2 tab in the morning and 1/2 tab at lunch (Patient not taking: Reported on 1/24/2018) 30 tablet 3     Tuberculin-Allergy Syringes (B-D TB SYRINGE) 25G X 5/8\" 1 ML MISC Use one every 30 days for cyanocobalamin (VITAMIN B12) injection. (Patient not taking: Reported on 1/24/2018) 12 each 0     cyanocobalamin (VITAMIN B12) 1000 MCG/ML injection Inject 1 mL (1,000 mcg) Subcutaneous every 30 days (Patient not taking: Reported on 1/24/2018) 1 mL 11     No facility-administered encounter medications on file as of 1/24/2018.      Again, thank you for allowing me to participate in the care of your patient.      Sincerely,    Alexander Bermudez MD     [facility]    "

## 2018-01-24 NOTE — PROGRESS NOTES
"HPI and Plan:   See dictation    No orders of the defined types were placed in this encounter.      No orders of the defined types were placed in this encounter.      There are no discontinued medications.      No diagnosis found.    CURRENT MEDICATIONS:  Current Outpatient Prescriptions   Medication Sig Dispense Refill     ibuprofen (ADVIL/MOTRIN) 600 MG tablet Take 1 tablet (600 mg) by mouth every 6 hours as needed for pain (mild) 30 tablet 0     sertraline (ZOLOFT) 100 MG tablet Take 1 tablet (100 mg) by mouth daily 90 tablet 3     VITAMIN D, CHOLECALCIFEROL, PO Take by mouth daily       phentermine (ADIPEX-P) 37.5 MG tablet Take 1/2 tab in the morning and 1/2 tab at lunch (Patient not taking: Reported on 2018) 30 tablet 3     Tuberculin-Allergy Syringes (B-D TB SYRINGE) 25G X 5/8\" 1 ML MISC Use one every 30 days for cyanocobalamin (VITAMIN B12) injection. (Patient not taking: Reported on 2018) 12 each 0     cyanocobalamin (VITAMIN B12) 1000 MCG/ML injection Inject 1 mL (1,000 mcg) Subcutaneous every 30 days (Patient not taking: Reported on 2018) 1 mL 11       ALLERGIES     Allergies   Allergen Reactions     No Known Drug Allergies        PAST MEDICAL HISTORY:  Past Medical History:   Diagnosis Date     Arthritis      Cancer (H)      Cervical high risk HPV (human papillomavirus) test positive 2017: NIL pap, + HR HPV (not 16 or 18) result.      Depression     anxiety, sees therapist     Diabetes (H)      Hypertension      Obesity        PAST SURGICAL HISTORY:  Past Surgical History:   Procedure Laterality Date     C IUD,MIRENA  2017      SECTION N/A 10/18/2014    Procedure:  SECTION;  Surgeon: Edilma Zayas MD;  Location:  [facility]     COLONOSCOPY       ENDOMETRIAL SAMPLING (BIOPSY) N/A 2017    Procedure: ENDOMETRIAL SAMPLING (BIOPSY);  Endometrial Biopsy with Mirena Intrauterine Device Placement;  Surgeon: Samantha Sultana MD;  Location: " [facility]     GYN SURGERY  05/2013    Fallopian Tubes Removed     HC AMNIOCENTESIS DIAGNOSTIC  7/31/2014     HC AMNIOCENTESIS DIAGNOSTIC  7/31/2014     hsg  8/5/11    blockage of both tubes     INSERT INTRAUTERINE DEVICE N/A 7/12/2017    Procedure: INSERT INTRAUTERINE DEVICE;;  Surgeon: Samantha Sultana MD;  [facility]     LAPAROSCOPIC GASTRIC SLEEVE  7/16/2012    Procedure: LAPAROSCOPIC GASTRIC SLEEVE;  Laparoscopic Sleeve Gastrectomy and Hiatal Hernia Repair;  Surgeon: Eze Muhammad MD;  [facility]     LAPAROSCOPIC SALPINGECTOMY  5/7/2013    Procedure: LAPAROSCOPIC SALPINGECTOMY;  Operative Laparoscopy, lysis of adhesions, Bilateral Salpingectomies ;  Surgeon: Abdelrahman Corcoran MD;  Location: [facility]     U/S GUIDANCE FOR TVOR (CLINIC ONLY)  10/2013       FAMILY HISTORY:  Family History   Problem Relation Age of Onset     DIABETES Maternal Grandmother      Hypertension Maternal Grandmother      MENTAL ILLNESS Maternal Grandmother      CEREBROVASCULAR DISEASE Maternal Grandmother      Allergies Father      Hypertension Father      Prostate Cancer Father      MENTAL ILLNESS Father      Obesity Father      Anxiety Disorder Mother      MENTAL ILLNESS Mother      Respiratory Brother      Obesity Brother        SOCIAL HISTORY:  Social History     Social History     Marital status:      Spouse name: N/A     Number of children: 1     Years of education: 18     Occupational History     professional development [employer]     Social History Main Topics     Smoking status: Never Smoker     Smokeless tobacco: Never Used     Alcohol use No      Comment: rarely     Drug use: No     Sexual activity: Yes     Partners: Male     Birth control/ protection: None      Comment: no fallopian tubes     Other Topics Concern     Parent/Sibling W/ Cabg, Mi Or Angioplasty Before 65f 55m? No     Social History Narrative       Review of Systems:  Skin:  Positive for itching legs   Eyes:  Positive for  glasses;contacts    ENT:  Negative      Respiratory:  Negative       Cardiovascular:  Negative      Gastroenterology: Positive for   sleeve gastrictomy  Genitourinary:  Negative      Musculoskeletal:  Positive for back pain;neck pain;joint pain;joint swelling;joint stiffness;foot pain;muscular weakness    Neurologic:    numbness or tingling of feet    Psychiatric:  Positive for anxiety    Heme/Lymph/Imm:  Positive for hay fever;allergies    Endocrine:  Negative        Physical Exam:  Vitals: /66 (BP Location: Right arm, Patient Position: Chair, Cuff Size: Adult Large)  Pulse 91  Wt 112.8 kg (248 lb 9.6 oz)  SpO2 99%  BMI 45.47 kg/m2    Constitutional:  cooperative;in no acute distress obese      Skin:  warm and dry to the touch          Head:  normocephalic        Eyes:  sclera white        Lymph:No Cervical lymphadenopathy present     ENT:  no pallor or cyanosis        Neck:  no stiffness        Respiratory:  clear to auscultation         Cardiac: regular rhythm;normal S1 and S2                pulses full and equal                                        GI:  abdomen soft obese      Extremities and Muscular Skeletal:        trace trace      Neurological:  affect appropriate        Psych:  affect appropriate, oriented to time, person and place        CC  No referring provider defined for this encounter.

## 2018-01-24 NOTE — NURSING NOTE
"Chief Complaint   Patient presents with     Consult     per pts request, family hx  and vascular problems       Initial /66 (BP Location: Right arm, Patient Position: Chair, Cuff Size: Adult Large)  Pulse 91  Wt 112.8 kg (248 lb 9.6 oz)  SpO2 99%  BMI 45.47 kg/m2 Estimated body mass index is 45.47 kg/(m^2) as calculated from the following:    Height as of 7/12/17: 1.575 m (5' 2\").    Weight as of this encounter: 112.8 kg (248 lb 9.6 oz).  Medication Reconciliation: complete   Stehpani Liu MA    "

## 2018-01-24 NOTE — MR AVS SNAPSHOT
After Visit Summary   1/24/2018    Deisi Miner    MRN: [id number]           Patient Information     Date Of Birth          1975        Visit Information        Provider Department      1/24/2018 2:15 PM Alexander Bermudez MD [facility]        Today's Diagnoses     Morbid obesity (H)    -  1    Lower extremity edema           Follow-ups after your visit        Your next 10 appointments already scheduled     Feb 19, 2018  9:00 AM CST   Return Visit with Marcela Dobson, PhD MARJORIE   [facility]  ([facility])    [facility]  [address]  [address] [facility] [address]   [phone]            Mar 21, 2018  8:00 AM CDT   Return Visit with Marcela Dobson, PhD MARJORIE   [facility]  ([facility])    [facility]  [address]  [address] [facility] [address]   [phone]              Who to contact     If you have questions or need follow up information about today's clinic visit or your schedule please contact [facility] directly at [phone].  Normal or non-critical lab and imaging results will be communicated to you by MyChart, letter or phone within 4 business days after the clinic has received the results. If you do not hear from us within 7 days, please contact the clinic through [employer] or phone. If you have a critical or abnormal lab result, we will notify you by phone as soon as possible.  Submit refill requests through [employer] or call your pharmacy and they will forward the refill request to us. Please allow 3 business days for your refill to be completed.          Additional Information About Your Visit        MyChart Information     [employer] gives you secure access to your electronic health record. If you see a primary care provider, you can also send messages to your care team and make  appointments. If you have questions, please call your primary care clinic.  If you do not have a primary care provider, please call [phone] and they will assist you.        Care EveryWhere ID     This is your Care EveryWhere ID. This could be used by other organizations to access your [facility] medical records  [id number]        Your Vitals Were     Pulse Pulse Oximetry BMI (Body Mass Index)             91 99% 45.47 kg/m2          Blood Pressure from Last 3 Encounters:   01/24/18 112/66   07/12/17 (!) 129/91   07/10/17 120/78    Weight from Last 3 Encounters:   01/24/18 112.8 kg (248 lb 9.6 oz)   07/12/17 114.7 kg (252 lb 13.9 oz)   07/10/17 115.3 kg (254 lb 3.2 oz)              Today, you had the following     No orders found for display       Primary Care Provider Office Phone # Fax #    Alejandro Jasiel Gifford [phone]       [address] [address]        Equal Access to Services     [facility]: Hadii aad ku hadasho Soomaali, waaxda luqadaha, qaybta kaalmada adeegyada, lilo schafer . So [facility] [phone].    ATENCIÓN: Si habla español, tiene a little disposición servicios gratuitos de asistencia lingüística. Llame al [phone].    We comply with applicable federal civil rights laws and [place] laws. We do not discriminate on the basis of race, color, national origin, age, disability, sex, sexual orientation, or gender identity.            Thank you!     Thank you for choosing [facility]  for your care. Our goal is always to provide you with excellent care. Hearing back from our patients is one way we can continue to improve our services. Please take a few minutes to complete the written survey that you may receive in the mail after your visit with us. Thank you!             Your Updated Medication List - Protect others around you: Learn how to safely use, store and throw away your medicines at  "www.disposemymeds.org.          This list is accurate as of 1/24/18  2:53 PM.  Always use your most recent med list.                   Brand Name Dispense Instructions for use Diagnosis    cyanocobalamin 1000 MCG/ML injection    VITAMIN B12    1 mL    Inject 1 mL (1,000 mcg) Subcutaneous every 30 days    Bariatric surgery status       ibuprofen 600 MG tablet    ADVIL/MOTRIN    30 tablet    Take 1 tablet (600 mg) by mouth every 6 hours as needed for pain (mild)    Encounter for insertion of mirena IUD       phentermine 37.5 MG tablet    ADIPEX-P    30 tablet    Take 1/2 tab in the morning and 1/2 tab at lunch    Morbid obesity, unspecified obesity type (H)       sertraline 100 MG tablet    ZOLOFT    90 tablet    Take 1 tablet (100 mg) by mouth daily    DENISE (generalized anxiety disorder)       Tuberculin-Allergy Syringes 25G X 5/8\" 1 ML Misc    B-D TB SYRINGE    12 each    Use one every 30 days for cyanocobalamin (VITAMIN B12) injection.    Bariatric surgery status       VITAMIN D (CHOLECALCIFEROL) PO      Take by mouth daily          "

## 2018-01-25 NOTE — PROGRESS NOTES
Service Date: 01/24/2018      REFERRING PHYSICIAN:  The patient is self-referred.      INDICATION:  Concerns about lower extremity circulation.      HISTORY OF PRESENT ILLNESS:  Mr. Miner is a very pleasant 42-year-old female with no significant past cardiovascular history or cardiovascular risk factors who has been having lower extremity pain and edema for many months, if not years.  She is concerned that this could be related to vascular issues, as she has vascular issues in her family.  Interestingly, she has no cardiovascular risk factors, denying diabetes, hypertension, hyperlipidemia or a prior history of smoking.  She also does not have a family history of premature atherosclerotic disease.      She does not have any claudication-like symptoms but instead has constant pain in her lower extremities.  She has also noticed that she has an increased girth of her right lower extremity versus her left lower extremity.  On exam, she does have some mild bilateral edema.      She denies any other cardiovascular complaints, denying any chest pain, chest pressure, shortness of breath, orthopnea or paroxysmal nocturnal dyspnea.      Please see my separate note with a full list of her allergies, medications, past medical history, social history, family history and review of systems.      Please see my separate note with her full physical examination.      IMPRESSION AND PLAN:  Ms. Miner is a pleasant 42-year-old female with a history of morbid obesity and resulting lower extremity edema.  I suspect the patient has some venous insufficiency.  There are no risk factors to suggest arterial insufficiency, and at this time I do not believe any further cardiovascular testing is warranted.  I have given her a referral to Dr. Barrera at [facility] to determine if venous insufficiency could be causing her symptoms.  She will follow back with me on a p.r.n. basis.         WINSOME CORONADO MD             D: 01/24/2018   T:  2018   MT: BABAR      Name:     NEO JERNIGAN   MRN:      [id number]        Account:      [id number]   :      1975           Service Date: 2018      Document: [id number]

## 2018-02-20 ENCOUNTER — OFFICE VISIT (OUTPATIENT)
Dept: PSYCHOLOGY | Facility: CLINIC | Age: 43
End: 2018-02-20
Payer: COMMERCIAL

## 2018-02-20 DIAGNOSIS — F41.1 GAD (GENERALIZED ANXIETY DISORDER): Primary | ICD-10-CM

## 2018-02-20 NOTE — MR AVS SNAPSHOT
After Visit Summary   2/20/2018    Deisi Miner    MRN: [id number]           Patient Information     Date Of Birth          1975        Visit Information        Provider Department      2/20/2018 2:00 PM Marcela Dobson, PhD [facility]         Today's Diagnoses     DENISE (generalized anxiety disorder)    -  1       Follow-ups after your visit        Your next 10 appointments already scheduled     Apr 02, 2018  2:00 PM CDT   Return Visit with Marcela Dobson, PhD MARJORIE   [facility]  ([facility])    [facility]  [address]  [address] [facility] [address] [phone]            May 02, 2018  2:00 PM CDT   Return Visit with Marcela Dobson, PhD MARJORIE   [facility]  ([facility])    [facility]  [address]  [address] [facility] [address] [phone]              Who to contact     Please call your clinic at [phone] to:    Ask questions about your health    Make or cancel appointments    Discuss your medicines    Learn about your test results    Speak to your doctor            Additional Information About Your Visit        [facility] Information     [email or web address] gives you secure access to your electronic health record. If you see a primary care provider, you can also send messages to your care team and make appointments. If you have questions, please call your primary care clinic.  If you do not have a primary care provider, please call [phone] and they will assist you.      [email or web address] is an electronic gateway that provides easy, online access to your medical records. With [email or web address], you can request a clinic appointment, read your test results, renew a prescription or communicate with your care team.     To access your existing account, please contact your [facility] or call [phone] for assistance.         Care EveryWhere ID     This is your Care EveryWhere ID. This could be used by other organizations to access your [place] medical records  [id number]         Blood Pressure from Last 3 Encounters:   01/24/18 112/66   07/12/17 (!) 129/91   07/10/17 120/78    Weight from Last 3 Encounters:   01/24/18 112.8 kg (248 lb 9.6 oz)   07/12/17 114.7 kg (252 lb 13.9 oz)   07/10/17 115.3 kg (254 lb 3.2 oz)              We Performed the Following     Individual Psychotherapy - Psychotherapy with pt and/or family present  60 mins [53+ mins] ([id number])        Primary Care Provider Office Phone # Fax #    Alejandro Jasiel Gifford [phone]       [address] [address]        Equal Access to Services     [facility]: Hadii jose alfredo paz hadasho Sotodd, waaxda luqadaha, qaybta kaalmada adeegyada, lilo bishop hayjose m schafer . So [facility] [phone].    ATENCIÓN: Si habla español, tiene a little disposición servicios gratuitos de asistencia lingüística. Llame al [phone].    We comply with applicable federal civil rights laws and [place] laws. We do not discriminate on the basis of race, color, national origin, age, disability, sex, sexual orientation, or gender identity.            Thank you!     Thank you for choosing [facility]   for your care. Our goal is always to provide you with excellent care. Hearing back from our patients is one way we can continue to improve our services. Please take a few minutes to complete the written survey that you may receive in the mail after your visit with us. Thank you!             Your Updated Medication List - Protect others around you: Learn how to safely use, store and throw away your medicines at www.disposemymeds.org.          This list is accurate as of 2/20/18  4:02 PM.  Always use your most recent med list.                   Brand Name Dispense Instructions for use Diagnosis    cyanocobalamin 1000 MCG/ML injection  "   VITAMIN B12    1 mL    Inject 1 mL (1,000 mcg) Subcutaneous every 30 days    Bariatric surgery status       ibuprofen 600 MG tablet    ADVIL/MOTRIN    30 tablet    Take 1 tablet (600 mg) by mouth every 6 hours as needed for pain (mild)    Encounter for insertion of mirena IUD       phentermine 37.5 MG tablet    ADIPEX-P    30 tablet    Take 1/2 tab in the morning and 1/2 tab at lunch    Morbid obesity, unspecified obesity type (H)       sertraline 100 MG tablet    ZOLOFT    90 tablet    Take 1 tablet (100 mg) by mouth daily    DENISE (generalized anxiety disorder)       Tuberculin-Allergy Syringes 25G X 5/8\" 1 ML Misc    B-D TB SYRINGE    12 each    Use one every 30 days for cyanocobalamin (VITAMIN B12) injection.    Bariatric surgery status       VITAMIN D (CHOLECALCIFEROL) PO      Take by mouth daily          "
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
## 2018-06-02 ENCOUNTER — HEALTH MAINTENANCE LETTER (OUTPATIENT)
Age: 43
End: 2018-06-02

## 2018-06-06 ENCOUNTER — TELEPHONE (OUTPATIENT)
Dept: CARDIOLOGY | Facility: CLINIC | Age: 43
End: 2018-06-06

## 2018-06-06 DIAGNOSIS — M79.604 PAIN IN BOTH LOWER EXTREMITIES: Primary | ICD-10-CM

## 2018-06-06 DIAGNOSIS — M79.605 PAIN IN BOTH LOWER EXTREMITIES: Primary | ICD-10-CM

## 2018-06-06 NOTE — TELEPHONE ENCOUNTER
"Received call from patient regarding referral per . Pt last seen 1/24/18, per OV note \" I have given her a referral to Dr. Barrera at [facility] to determine if venous insufficiency could be causing her symptoms.\"    Pt states insurance company would like formal referral form and supporting documents. Faxed last OV note and referral form to [phone] [facility] Called [facility] and left a message at [phone]. Referral placed for  at [facility].     ADDENDUM: Spoke with Rafat at [facility]. He explained they were requesting prior authorization d/t codes in referral and clinic being outside of network. Pt does have out-of-network benefits. Called pt and advised she call member services and [facility]. TOPHER Escobar    "

## 2018-07-02 DIAGNOSIS — F41.1 GAD (GENERALIZED ANXIETY DISORDER): ICD-10-CM

## 2018-07-02 RX ORDER — SERTRALINE HYDROCHLORIDE 100 MG/1
100 TABLET, FILM COATED ORAL DAILY
Qty: 30 TABLET | Refills: 0 | Status: SHIPPED | OUTPATIENT
Start: 2018-07-02 | End: 2018-12-03

## 2018-07-02 NOTE — TELEPHONE ENCOUNTER
Pending Prescriptions:                       Disp   Refills    sertraline (ZOLOFT) 100 MG tablet         90 tab*0            Sig: Take 1 tablet (100 mg) by mouth daily    Last OV was 7/5/17. Due for AE and phq-9 and melanie-7. Pt is scheduled for 7/30/18. One month refill sent to pharmacy.   Windy Aguillon RN-BSN

## 2018-07-07 ENCOUNTER — HEALTH MAINTENANCE LETTER (OUTPATIENT)
Age: 43
End: 2018-07-07

## 2018-09-24 ENCOUNTER — OFFICE VISIT (OUTPATIENT)
Dept: PODIATRY | Facility: CLINIC | Age: 43
End: 2018-09-24
Payer: COMMERCIAL

## 2018-09-24 VITALS
BODY MASS INDEX: 46.28 KG/M2 | SYSTOLIC BLOOD PRESSURE: 112 MMHG | HEIGHT: 62 IN | WEIGHT: 251.5 LBS | DIASTOLIC BLOOD PRESSURE: 70 MMHG

## 2018-09-24 DIAGNOSIS — M72.2 PLANTAR FASCIAL FIBROMATOSIS: Primary | ICD-10-CM

## 2018-09-24 PROCEDURE — 20550 NJX 1 TENDON SHEATH/LIGAMENT: CPT | Mod: 50 | Performed by: PODIATRIST

## 2018-09-24 PROCEDURE — 99203 OFFICE O/P NEW LOW 30 MIN: CPT | Mod: 25 | Performed by: PODIATRIST

## 2018-09-24 NOTE — MR AVS SNAPSHOT
After Visit Summary   9/24/2018    Deisi Miner    MRN: [id number]           Patient Information     Date Of Birth          1975        Visit Information        Provider Department      9/24/2018 3:00 PM Evin Nj DPM [facility]        Care Instructions    Thank you for choosing [facility]!    DR. NJ'S CLINIC LOCATIONS:   MONDAY - [place] TUESDAY - [place]   [address] [place] [address]  [address]   [place] [address] [place] [address]   [phone]       THURSDAY AM - [place] THURSDAY PM - [place]   [address]   [place] [address] [place] [address]   [phone]       FRIDAY AM - [place] SET UP SURGERY: [phone] [address] APPOINTMENTS: [phone]   [place] [address] BILLING QUESTIONS: [phone] FAX NUMBER: [phone]     Follow Up: as needed    PLANTAR FASCIITIS    Plantar fasciitis is often referred to as heel spurs or heel pain. Plantar fasciitis is a very common problem that affects people of all foot shapes, age, weight and activity level. Pain may be in the arch or on the weight-bearing surface of the heel. The pain may come on without injury or identifiable cause. Pain is generally present when first getting out of bed in the morning or up from a seated break.     CAUSES  The plantar fascia is a dense fibrous band of tissue that stretches across the bottom surface of the foot. The fascia helps support the foot muscles and arch. Plantar fasciitis is thought to be caused by mechanical strain or overload. Frequent walking without shoes or wearing unsupportive shoes is thought to cause structural overload and ultimately inflammation of the plantar fascia. Some people have heel spurs that can be seen on x-ray. The heel spur is actually a minor component of plantar fascitis and is largely ignored.       SELF  TREATMENT   The easiest solution is to stop walking around your home without shoes. Plantar fasciitis is largely a shoe problem. Shoes are either not being worn often enough or your current shoes are inadequate for your weight, foot structure or activity level. The majority of shoes on the market today are not sufficient to resist development of plantar fasciitis or to promote healing. Assume that your current shoes are inadequate and will need to be replaced. Even high quality shoes wear out with 6 months to one year of frequent use. Weight loss is another option. Losing ten pounds in the next two months may be enough to resolve the problem. Ice applied to the area of pain two to three times per day for ten minutes each session can be very helpful. This should continue until the problem resolves. Achilles tendon stretching is essential. Stretch multiple times daily to promote healing and to prevent recurrence in the future.     MEDICAL TREATMENT  Medical treatments often include custom arch supports, cortisone injections, physical therapy, splints to be worn in bed, prescription medications and surgery. The home treatments listed above will be necessary regardless of these advanced medical treatments. Surgery is rarely needed but is very helpful in selected cases.     PROGNOSIS  Plantar fasciitis can last from one day to a lifetime. Some people get intermittent fascitis that is very short-lived. Others suffer daily for years. Excessive body weight, frequent bare foot walking, long hours on the feet, inadequate shoes, predisposing foot structures and excessive activity such as running are all potential issues that lead to chronic and/or recurring plantar fascitis. Having plantar fasciitis means that you are forever prone to this problem and will require modification of some of the above factors. Most people seek treatment within one to four months. Healing usually requires a similar one to four month time frame.  Healing time is relative to the amount of effort spent treating the problem.   Plantar fasciitis is highly recurrent. Risk factors often continue, including return to bare foot walking, inadequate shoes, excessive body weight, excessive activities, etc. Your life style and foot structure may predispose you to recurrent plantar fasciitis. A daily prevention regimen can be very helpful. Ongoing use of shoe inserts, careful attention to appropriate shoes, daily Achilles stretching, etc. may prevent recurrence. Prompt attention at the earliest warning signs of heel pain can resolve the problem in as short as a few days.     EXERCISES    Stair Exercise: Step on the stairs with the ball of your foot and hold your position for at least 15 seconds, then slowly step down with the heels of your foot. You can do this daily and as often as you want.   Picking the Towel: Sit comfortably and then pick the towel up with your toes. You can use any object other than a towel as long as the material can be soft and you can pick it up with your toes.  Rolling the Bottle: Use a small ball or frozen water bottle and then roll it around with your foot.   Flex the Toes: Sit comfortably and then flex your toes by pointing it towards the floor or towards your body. This will relax and flex your foot and exercise your plantar fascia, the calf, and the Achilles tendon. The inability of the foot to stretch often causes the bunching up of the plantar fascia area leading to the pain.  Calf/Achilles Stretching: Lay on you back and raise one foot, then point your toes towards the floor. See photo below:               Hold each stretch for 10 seconds. Stretch 10 times per set, three sets per day. Morning, afternoon and evening. If your heel pain is very severe in the morning, consider doing the first set of stretches before you get out of bed.    THERAPIES COVERED:  1.  Supportive Shoes: minimizing barefoot ambulation helps to provide cushion,  padding and support to the ligament that is inflamed. Socks, flip flops, flats and some slippers are not typically sufficient to provide support. Shoes should be worn even indoors  2.  Insert/Orthotics: ones with an arch support built in to them provide further stress relief for the ligament. See the information below on recommended inserts.  3. Icing: using a frozen water bottle or orange, and rolling it along the bottom of the arch/heel can help to alleviate discomfort, and can act as a tissue massage to the painful, inflamed ligament.  There is evidence that shows icing at least three times daily can be beneficial  4.  Antiinflammatory (NSAID): Ibuprofen, Aleve, as well as Tylenol can be used to help decrease symptoms and improve pain levels. If you have high blood pressure, heart disease, stomach or kidney problems, use antiinflammatories sparingly. Tylenol should not be used if you have liver problems.   5. Activity Modifications: if there are certain things that you do, whether it's going barefoot or certain shoes/activities, you should try to minimize those activities as much as possible until your symptoms are sufficiently resolved. Certainly, some activities, such as running on the treadmill, are easier to take a break from versus others, such as work or chores at home. If there are certain activities that hurt your heel, and you keep doing those activities that hurt your heel, your heel will keep hurting.  **If these initial therapies are insufficient, we have our tier 2 therapies that can more aggressively work to improve your symptoms and get you back to the activities that you enjoy!    OVER THE COUNTER INSERTS    SuperFeet   Sofsole Fit Spenco   Power Step   Walk-Fit Arch Cradles     Most of these can be found at your local [employer], sporting [employer], or online.  **A good high quality over the counter insert should cost around $40-$50      [facility]  [address]  [facility]  [phone]   [address]  [phone] [place]  [address]  [phone]   [place]  [address]  [phone]   [place]  [address]  [phone] [place]  [address]  [phone]   [place]  [address]  [phone]   [place]  [address] [facility]  [phone] [place]  [address]  [phone]           Body Mass Index (BMI)  Many things can cause foot and ankle problems. Foot structure, activity level, foot mechanics and injuries are common causes of pain. One very important issue that often goes unmentioned, is body weight. Extra weight can cause increased stress on muscles, ligaments, bones and tendons. Sometimes just a few extra pounds is all it takes to put one over her/his threshold. Without reducing that stress, it can be difficult to alleviate pain. Some people are uncomfortable addressing this issue, but we feel it is important for you to think about it. As Foot [facility]  Ankle specialists, our job is addressing the lower extremity problem and possible causes. Regarding extra body weight, we encourage patients to discuss diet and weight management plans with their primary care doctors. It is this team approach that gives you the best opportunity for pain relief and getting you back on your feet.              Follow-ups after your visit        Who to contact     If you have questions or need follow up information about today's clinic visit or your schedule please contact [facility] directly at [phone].  Normal or non-critical lab and imaging results will be communicated to you by MyChart, letter or phone within 4 business days after the clinic has received the results. If you do not hear from us within 7 days, please contact the clinic through MyChart or phone. If you have a critical or abnormal lab result, we will notify you by phone as soon as possible.  Submit refill  "requests through [employer] or call your pharmacy and they will forward the refill request to us. Please allow 3 business days for your refill to be completed.          Additional Information About Your Visit        [employer] Information     [employer] gives you secure access to your electronic health record. If you see a primary care provider, you can also send messages to your care team and make appointments. If you have questions, please call your primary care clinic.  If you do not have a primary care provider, please call [phone] and they will assist you.        Care EveryWhere ID     This is your Care EveryWhere ID. This could be used by other organizations to access your [place] medical records  [id number]        Your Vitals Were     Height BMI (Body Mass Index)                5' 2\" (1.575 m) 46 kg/m2           Blood Pressure from Last 3 Encounters:   09/24/18 112/70   01/24/18 112/66   07/12/17 (!) 129/91    Weight from Last 3 Encounters:   09/24/18 251 lb 8 oz (114.1 kg)   01/24/18 248 lb 9.6 oz (112.8 kg)   07/12/17 252 lb 13.9 oz (114.7 kg)              Today, you had the following     No orders found for display       Primary Care Provider Office Phone # Fax #    Alejandro Jasiel Gifford [phone]       [address] [facility] [address]  [facility] [place] [address]        Equal Access to Services     [facility] : Hadii jose alfredo paz hadasho Sotodd, waaxda luqadaha, qaybta kaalmada burtonyada, lilo schafer . So [facility] [phone].    ATENCIÓN: Si habla español, tiene a little disposición servicios gratuitos de asistencia lingüística. Emilee al [phone].    We comply with applicable federal civil rights laws and [place] laws. We do not discriminate on the basis of race, color, national origin, age, disability, sex, sexual orientation, or gender identity.            Thank you!     Thank you for choosing [facility]  for your care. Our goal is always to provide you with " "excellent care. Hearing back from our patients is one way we can continue to improve our services. Please take a few minutes to complete the written survey that you may receive in the mail after your visit with us. Thank you!             Your Updated Medication List - Protect others around you: Learn how to safely use, store and throw away your medicines at www.disposemymeds.org.          This list is accurate as of 9/24/18  3:33 PM.  Always use your most recent med list.                   Brand Name Dispense Instructions for use Diagnosis    cyanocobalamin 1000 MCG/ML injection    VITAMIN B12    1 mL    Inject 1 mL (1,000 mcg) Subcutaneous every 30 days    Bariatric surgery status       ibuprofen 600 MG tablet    ADVIL/MOTRIN    30 tablet    Take 1 tablet (600 mg) by mouth every 6 hours as needed for pain (mild)    Encounter for insertion of mirena IUD       phentermine 37.5 MG tablet    ADIPEX-P    30 tablet    Take 1/2 tab in the morning and 1/2 tab at lunch    Morbid obesity, unspecified obesity type (H)       sertraline 100 MG tablet    ZOLOFT    30 tablet    Take 1 tablet (100 mg) by mouth daily    DENISE (generalized anxiety disorder)       Tuberculin-Allergy Syringes 25G X 5/8\" 1 ML Misc    B-D TB SYRINGE    12 each    Use one every 30 days for cyanocobalamin (VITAMIN B12) injection.    Bariatric surgery status       VITAMIN D (CHOLECALCIFEROL) PO      Take by mouth daily          "

## 2018-09-24 NOTE — PROGRESS NOTES
"Foot & Ankle Surgery  2018    CC: \"very painful feet, plantar fasciitis\"    I was asked to see Deisi Miner regarding the chief complaint by:  self    HPI:  Pt is a 43 year old female who presents with above complaint.  Bilateral lower extremity pain \"right worse than left\" x 3 weeks.  Describes \"shooting pain from my heal - all the time worse w/ no shoes\".  Pain /10 \"everyday\", worse with standing/walking.  She has tried good shoes, soaking feet, ice packs with some improvement.  Describes morning pain and post-static dyskinesia.  No injury.  Worse without shoes.      ROS:   Pos for CC.  The patient denies current nausea, vomiting, chills, fevers, belly pain, calf pain, chest pain or SOB.  Complete remainder of ROS is otherwise neg.    VITALS:    Vitals:    18 1510   BP: 112/70   Weight: 251 lb 8 oz (114.1 kg)   Height: 5' 2\" (1.575 m)       PMH:    Past Medical History:   Diagnosis Date     Arthritis      Cancer (H)      Cervical high risk HPV (human papillomavirus) test positive 2017: NIL pap, + HR HPV (not 16 or 18) result.      Depression     anxiety, sees therapist     Diabetes (H)      Hypertension      Obesity        SXHX:    Past Surgical History:   Procedure Laterality Date     C IUD,MIRENA  2017      SECTION N/A 10/18/2014    Procedure:  SECTION;  Surgeon: Edilma Zayas MD;  [facility]: [facility] L[facility]     COLONOSCOPY       ENDOMETRIAL SAMPLING (BIOPSY) N/A 2017    Procedure: ENDOMETRIAL SAMPLING (BIOPSY);  Endometrial Biopsy with Mirena Intrauterine Device Placement;  Surgeon: Samantha Sultana MD;  Location: [facility] OR     GYN SURGERY  2013    Fallopian Tubes Removed     HC AMNIOCENTESIS DIAGNOSTIC  2014     HC AMNIOCENTESIS DIAGNOSTIC  2014     hsg  11    blockage of both tubes     INSERT INTRAUTERINE DEVICE N/A 2017    Procedure: INSERT INTRAUTERINE DEVICE;;  Surgeon: Samantha Sultana MD;  [facility] OR " "    LAPAROSCOPIC GASTRIC SLEEVE  7/16/2012    Procedure: LAPAROSCOPIC GASTRIC SLEEVE;  Laparoscopic Sleeve Gastrectomy and Hiatal Hernia Repair;  Surgeon: Eze Muhammad MD;  [facility]     LAPAROSCOPIC SALPINGECTOMY  5/7/2013    Procedure: LAPAROSCOPIC SALPINGECTOMY;  Operative Laparoscopy, lysis of adhesions, Bilateral Salpingectomies ;  Surgeon: Abdelrahman Corcoran MD;  Location: [facility] OR     U/S GUIDANCE FOR TVOR (CLINIC ONLY)  10/2013        MEDS:    Current Outpatient Prescriptions   Medication     cyanocobalamin (VITAMIN B12) 1000 MCG/ML injection     ibuprofen (ADVIL/MOTRIN) 600 MG tablet     phentermine (ADIPEX-P) 37.5 MG tablet     sertraline (ZOLOFT) 100 MG tablet     Tuberculin-Allergy Syringes (B-D TB SYRINGE) 25G X 5/8\" 1 ML MISC     VITAMIN D, CHOLECALCIFEROL, PO     No current facility-administered medications for this visit.        ALL:     Allergies   Allergen Reactions     No Known Drug Allergies        FMH:    Family History   Problem Relation Age of Onset     Diabetes Maternal Grandmother      Hypertension Maternal Grandmother      Mental Illness Maternal Grandmother      Cerebrovascular Disease Maternal Grandmother      Allergies Father      Hypertension Father      Prostate Cancer Father      Mental Illness Father      Obesity Father      Anxiety Disorder Mother      Mental Illness Mother      Respiratory Brother      Obesity Brother        SocHx:    Social History     Social History     Marital status:      Spouse name: N/A     Number of children: 1     Years of education: 18     Occupational History     professional development [employer]     Social History Main Topics     Smoking status: Never Smoker     Smokeless tobacco: Never Used     Alcohol use No      Comment: rarely     Drug use: No     Sexual activity: Yes     Partners: Male     Birth control/ protection: None      Comment: no fallopian tubes     Other Topics Concern     Parent/Sibling W/ Cabg, Mi Or " Angioplasty Before 65f 55m? No     Social History Narrative           EXAMINATION:  Gen:   No apparent distress  Neuro:   A&Ox3, no deficits  Psych:    Answering questions appropriately for age and situation with normal affect  Head:    NCAT  Eye:    Visual scanning without deficit  Ear:    Response to auditory stimuli wnl  Lung:    Non-labored breathing on RA noted  Abd:    NTND per patient report  Lymph:    Neg for pitting/non-pitting edema BLE  Vasc:    Pulses palpable, CFT minimally delayed  Neuro:    Light touch sensation intact to all sensory nerve distributions without paresthesias  Derm:    Neg for nodules, lesions or ulcerations  MSK:    Right lower extremity - pain at PF insertion, minimal ICN pain,otherwise neg.  Left lower extremity - pain at PF insertion and with calcaneal compression, otherwise neg  Calf:    Neg for redness, swelling or tenderness      Assessment:  43 year old female with bilateral lower extremity heel pain      Plan:  Discussed etiologies, anatomy and options  1.  Bilateral lower extremity heel pain 2/2 to plantar fasciitis, mariee's neuritis(R) and calcaneal inflammation(L)  -Regarding the plantar fasciitis, the Plantar Fasciitis handout was dispensed and discussed.  We talked about stretching, resting/activity modification, icing, NSAID/tylenol use as tolerated, inserts, supportive/comfortable shoes and minimizing shoeless walking.    -discussed Achilles, plantar fascial and hamstring stretches  -OTC insert information dispensed and discussed   -bilateral plantar fascial insertion steroid injection at patient request    After obtaining written consent, the skin was prepped with alcohol.  The needle was advance to the underlying plantar fascial insertion bilateral, aspiration was done with position change.  1 1/2cc mixture of 2:1 kenalog 40:0.25% marcaine plain was injected.  The patient tolerated the procedure without complication.  Risks that were discussed included possible  joint/soft tissue damage, neuritis/numbness, infection, pigment change, steroid flare.       Follow up:  Prn based on injection or sooner with acute issues      Patient's medical history was reviewed today    Body mass index is 46 kg/(m^2).  Weight management plan: Patient was referred to their PCP to discuss a diet and exercise plan.        Evin Rubio DPM [facility] FACFAOM  Podiatric Foot & Ankle Surgeon  [facility]  [phone]

## 2018-09-24 NOTE — PATIENT INSTRUCTIONS
Thank you for choosing [facility]!    DR. NJ'S CLINIC LOCATIONS:   MONDAY - [place] TUESDAY - [place]   [address] [facility]  [address]   [place] [address] [place] [address]   [phone]       THURSDAY AM - [place] THURSDAY PM - [place]   [address]   [place] [address] [place] [address]   [phone]       FRIDAY AM - [facility] UP SURGERY: [phone] [address] APPOINTMENTS: [phone]   [place] [address] BILLING QUESTIONS: [phone] FAX NUMBER: [phone]     Follow Up: as needed    PLANTAR FASCIITIS    Plantar fasciitis is often referred to as heel spurs or heel pain. Plantar fasciitis is a very common problem that affects people of all foot shapes, age, weight and activity level. Pain may be in the arch or on the weight-bearing surface of the heel. The pain may come on without injury or identifiable cause. Pain is generally present when first getting out of bed in the morning or up from a seated break.     CAUSES  The plantar fascia is a dense fibrous band of tissue that stretches across the bottom surface of the foot. The fascia helps support the foot muscles and arch. Plantar fasciitis is thought to be caused by mechanical strain or overload. Frequent walking without shoes or wearing unsupportive shoes is thought to cause structural overload and ultimately inflammation of the plantar fascia. Some people have heel spurs that can be seen on x-ray. The heel spur is actually a minor component of plantar fascitis and is largely ignored.       SELF TREATMENT   The easiest solution is to stop walking around your home without shoes. Plantar fasciitis is largely a shoe problem. Shoes are either not being worn often enough or your current shoes are inadequate for your weight, foot structure or activity level. The majority of shoes on the market today are  not sufficient to resist development of plantar fasciitis or to promote healing. Assume that your current shoes are inadequate and will need to be replaced. Even high quality shoes wear out with 6 months to one year of frequent use. Weight loss is another option. Losing ten pounds in the next two months may be enough to resolve the problem. Ice applied to the area of pain two to three times per day for ten minutes each session can be very helpful. This should continue until the problem resolves. Achilles tendon stretching is essential. Stretch multiple times daily to promote healing and to prevent recurrence in the future.     MEDICAL TREATMENT  Medical treatments often include custom arch supports, cortisone injections, physical therapy, splints to be worn in bed, prescription medications and surgery. The home treatments listed above will be necessary regardless of these advanced medical treatments. Surgery is rarely needed but is very helpful in selected cases.     PROGNOSIS  Plantar fasciitis can last from one day to a lifetime. Some people get intermittent fascitis that is very short-lived. Others suffer daily for years. Excessive body weight, frequent bare foot walking, long hours on the feet, inadequate shoes, predisposing foot structures and excessive activity such as running are all potential issues that lead to chronic and/or recurring plantar fascitis. Having plantar fasciitis means that you are forever prone to this problem and will require modification of some of the above factors. Most people seek treatment within one to four months. Healing usually requires a similar one to four month time frame. Healing time is relative to the amount of effort spent treating the problem.   Plantar fasciitis is highly recurrent. Risk factors often continue, including return to bare foot walking, inadequate shoes, excessive body weight, excessive activities, etc. Your life style and foot structure may predispose you to  recurrent plantar fasciitis. A daily prevention regimen can be very helpful. Ongoing use of shoe inserts, careful attention to appropriate shoes, daily Achilles stretching, etc. may prevent recurrence. Prompt attention at the earliest warning signs of heel pain can resolve the problem in as short as a few days.     EXERCISES    Stair Exercise: Step on the stairs with the ball of your foot and hold your position for at least 15 seconds, then slowly step down with the heels of your foot. You can do this daily and as often as you want.   Picking the Towel: Sit comfortably and then pick the towel up with your toes. You can use any object other than a towel as long as the material can be soft and you can pick it up with your toes.  Rolling the Bottle: Use a small ball or frozen water bottle and then roll it around with your foot.   Flex the Toes: Sit comfortably and then flex your toes by pointing it towards the floor or towards your body. This will relax and flex your foot and exercise your plantar fascia, the calf, and the Achilles tendon. The inability of the foot to stretch often causes the bunching up of the plantar fascia area leading to the pain.  Calf/Achilles Stretching: Lay on you back and raise one foot, then point your toes towards the floor. See photo below:               Hold each stretch for 10 seconds. Stretch 10 times per set, three sets per day. Morning, afternoon and evening. If your heel pain is very severe in the morning, consider doing the first set of stretches before you get out of bed.    THERAPIES COVERED:  1.  Supportive Shoes: minimizing barefoot ambulation helps to provide cushion, padding and support to the ligament that is inflamed. Socks, flip flops, flats and some slippers are not typically sufficient to provide support. Shoes should be worn even indoors  2.  Insert/Orthotics: ones with an arch support built in to them provide further stress relief for the ligament. See the information  below on recommended inserts.  3. Icing: using a frozen water bottle or orange, and rolling it along the bottom of the arch/heel can help to alleviate discomfort, and can act as a tissue massage to the painful, inflamed ligament.  There is evidence that shows icing at least three times daily can be beneficial  4.  Antiinflammatory (NSAID): Ibuprofen, Aleve, as well as Tylenol can be used to help decrease symptoms and improve pain levels. If you have high blood pressure, heart disease, stomach or kidney problems, use antiinflammatories sparingly. Tylenol should not be used if you have liver problems.   5. Activity Modifications: if there are certain things that you do, whether it's going barefoot or certain shoes/activities, you should try to minimize those activities as much as possible until your symptoms are sufficiently resolved. Certainly, some activities, such as running on the treadmill, are easier to take a break from versus others, such as work or chores at home. If there are certain activities that hurt your heel, and you keep doing those activities that hurt your heel, your heel will keep hurting.  **If these initial therapies are insufficient, we have our tier 2 therapies that can more aggressively work to improve your symptoms and get you back to the activities that you enjoy!    OVER THE COUNTER INSERTS    SuperFeet   Sofsole Fit Spenco   Power Step   Walk-Fit Arch Cradles     Most of these can be found at your local [employer], sporting [employer], or online.  **A good high quality over the counter insert should cost around $40-$50      [facility] LOCATIONS    [address]  [phone]   [address]  [phone] [place]  [address] [place] [address]  [phone]   [place]  [address] [place] [address] [address].  [phone]   [place]  [address]  [phone] [place] [address]  [phone]   [place]  [address] [place] [address]  [phone]    [place]  [address] [facility]  [phone] [place]  [address] [facility] [phone]           Body Mass Index (BMI)  Many things can cause foot and ankle problems. Foot structure, activity level, foot mechanics and injuries are common causes of pain. One very important issue that often goes unmentioned, is body weight. Extra weight can cause increased stress on muscles, ligaments, bones and tendons. Sometimes just a few extra pounds is all it takes to put one over her/his threshold. Without reducing that stress, it can be difficult to alleviate pain. Some people are uncomfortable addressing this issue, but we feel it is important for you to think about it. As Foot &  Ankle specialists, our job is addressing the lower extremity problem and possible causes. Regarding extra body weight, we encourage patients to discuss diet and weight management plans with their primary care doctors. It is this team approach that gives you the best opportunity for pain relief and getting you back on your feet.

## 2018-09-28 RX ORDER — TRIAMCINOLONE ACETONIDE 40 MG/ML
40 INJECTION, SUSPENSION INTRA-ARTICULAR; INTRAMUSCULAR ONCE
Qty: 2 ML | Refills: 0 | OUTPATIENT
Start: 2018-09-28 | End: 2018-09-28

## 2018-10-01 ENCOUNTER — TELEPHONE (OUTPATIENT)
Dept: PODIATRY | Facility: CLINIC | Age: 43
End: 2018-10-01

## 2018-10-01 NOTE — TELEPHONE ENCOUNTER
Routed to Dr. Rubio to advise.    Radha Delgado CMA ([facility])  Podiatry/Foot & Ankle Surgery  [facility]

## 2018-10-01 NOTE — TELEPHONE ENCOUNTER
Reason for call:  Other   Patient called regarding (reason for call): call back  Additional comments: Pain has progressed in right foot since seeing podiatry.  She has been doing exercises and wearing shoes. She is wondering about the night time splint.  Please call to discuss.     Phone number to reach patient:  Cell number on file:    Telephone Information:   Mobile [phone]       Best Time:  any    Can we leave a detailed message on this number?  YES

## 2018-10-05 NOTE — TELEPHONE ENCOUNTER
I called and LVM.  Informed she get a night splint, informed her she can find them at [facility], [facility], [facility],etc, and advised she call with any questions.    MARCELINO WangM FACFAS FACFAOM  Podiatric Foot & Ankle Surgeon  [facility]

## 2018-12-03 ENCOUNTER — OFFICE VISIT (OUTPATIENT)
Dept: OBGYN | Facility: CLINIC | Age: 43
End: 2018-12-03
Payer: COMMERCIAL

## 2018-12-03 VITALS
WEIGHT: 246 LBS | HEIGHT: 62 IN | DIASTOLIC BLOOD PRESSURE: 76 MMHG | SYSTOLIC BLOOD PRESSURE: 124 MMHG | BODY MASS INDEX: 45.27 KG/M2 | HEART RATE: 76 BPM

## 2018-12-03 DIAGNOSIS — Z01.419 ENCOUNTER FOR GYNECOLOGICAL EXAMINATION WITHOUT ABNORMAL FINDING: Primary | ICD-10-CM

## 2018-12-03 DIAGNOSIS — Z13.0 SCREENING FOR IRON DEFICIENCY ANEMIA: ICD-10-CM

## 2018-12-03 DIAGNOSIS — R87.810 CERVICAL HIGH RISK HPV (HUMAN PAPILLOMAVIRUS) TEST POSITIVE: ICD-10-CM

## 2018-12-03 DIAGNOSIS — Z13.29 SCREENING FOR THYROID DISORDER: ICD-10-CM

## 2018-12-03 DIAGNOSIS — F41.1 GAD (GENERALIZED ANXIETY DISORDER): ICD-10-CM

## 2018-12-03 DIAGNOSIS — Z13.1 SCREENING FOR DIABETES MELLITUS: ICD-10-CM

## 2018-12-03 DIAGNOSIS — Z13.220 SCREENING FOR LIPOID DISORDERS: ICD-10-CM

## 2018-12-03 PROCEDURE — 88175 CYTOPATH C/V AUTO FLUID REDO: CPT | Performed by: OBSTETRICS & GYNECOLOGY

## 2018-12-03 PROCEDURE — 99396 PREV VISIT EST AGE 40-64: CPT | Performed by: OBSTETRICS & GYNECOLOGY

## 2018-12-03 PROCEDURE — 87624 HPV HI-RISK TYP POOLED RSLT: CPT | Performed by: OBSTETRICS & GYNECOLOGY

## 2018-12-03 RX ORDER — SERTRALINE HYDROCHLORIDE 100 MG/1
100 TABLET, FILM COATED ORAL DAILY
Qty: 90 TABLET | Refills: 3 | Status: SHIPPED | OUTPATIENT
Start: 2018-12-03 | End: 2018-12-04

## 2018-12-03 ASSESSMENT — ANXIETY QUESTIONNAIRES
6. BECOMING EASILY ANNOYED OR IRRITABLE: MORE THAN HALF THE DAYS
3. WORRYING TOO MUCH ABOUT DIFFERENT THINGS: MORE THAN HALF THE DAYS
2. NOT BEING ABLE TO STOP OR CONTROL WORRYING: MORE THAN HALF THE DAYS
1. FEELING NERVOUS, ANXIOUS, OR ON EDGE: MORE THAN HALF THE DAYS
5. BEING SO RESTLESS THAT IT IS HARD TO SIT STILL: NOT AT ALL

## 2018-12-03 ASSESSMENT — PATIENT HEALTH QUESTIONNAIRE - PHQ9
SUM OF ALL RESPONSES TO PHQ QUESTIONS 1-9: 5
5. POOR APPETITE OR OVEREATING: MORE THAN HALF THE DAYS

## 2018-12-03 NOTE — PROGRESS NOTES
Deisi is a 43 year old  female who presents for annual exam.     Menses are rare   Patient's last menstrual period was 2018.. Using IUD for contraception.  She is not currently considering pregnancy.  Besides routine health maintenance, she has no other health concerns today .  Daughter is now 4 years old.  Has lost 10 pounds since August and happy about that.  Knows she has some weight to go.  GYNECOLOGIC HISTORY:  Menarche: 13  Deisi is sexually active with 1 male partner(s) and is currently in monogamous relationship with .    History sexually transmitted infections:No STD history  STI testing offered?  Declined  BOB exposure: Unknown  History of abnormal Pap smear: YES - updated in Problem List and Health Maintenance accordingly  Family history of breast CA: No  Family history of uterine/ovarian CA: No    Family history of colon CA: No    HEALTH MAINTENANCE:  Cholesterol: (  Cholesterol   Date Value Ref Range Status   2015 181 <200 mg/dL Final     Comment:     LDL Cholesterol is the primary guide to therapy.   The NCEP recommends further evaluation of: patients with cholesterol greater   than 200 mg/dL if additional risk factors are present, cholesterol greater   than   240 mg/dL, triglycerides greater than 150 mg/dL, or HDL less than 40 mg/dL.     2014 152 <200 mg/dL Final     Comment:     LDL Cholesterol is the primary guide to therapy.   The NCEP recommends further evaluation of: patients with cholesterol greater   than 200 mg/dL if additional risk factors are present, cholesterol greater   than   240 mg/dL, triglycerides greater than 150 mg/dL, or HDL less than 40 mg/dL.      History of abnormal lipids: No    Mammo: 08 . History of abnormal Mammo: No.  Regular Self Breast Exams: Yes    Current or Past (Physical, Sexual or Emotional):  No  Do you feel safe in your environment:  Yes    Calcium/Vitamin D intake: source:  dairy, dietary supplement(s) Adequate? Yes   Last TDAP?   Offered today? No    TSH: (  TSH   Date Value Ref Range Status   2014 0.69 0.40 - 4.00 mU/L Final     Comment:     Effective 2014, the reference range for this assay has changed to reflect   new instrumentation/methodology.      )  Pap; (  Lab Results   Component Value Date    PAP NIL 2017    PAP NIL 2012    PAP NIL 2009    )    HISTORY:  Obstetric History       T0      L1     SAB0   TAB0   Ectopic0   Multiple1   Live Births2       # Outcome Date GA Lbr Artur/2nd Weight Sex Delivery Anes PTL Lv   1A  10/18/14 34w1d  2 lb 12.1 oz (1.25 kg) M CS-LTranv Spinal  ND      Name: Altaf      Apgar1:  6                Apgar5: 7   1B  10/18/14 34w1d  2 lb 13.9 oz (1.3 kg) F CS-LTranv Spinal  KARI      Name: Sera      Apgar1:  8                Apgar5: 9        Past Medical History:   Diagnosis Date     Arthritis      Cancer (H)      Cervical high risk HPV (human papillomavirus) test positive 2017: NIL pap, + HR HPV (not 16 or 18) result.      Depression     anxiety, sees therapist     Diabetes (H)      Hypertension      Obesity      Past Surgical History:   Procedure Laterality Date     C IUD,MIRENA  2017      SECTION N/A 10/18/2014    Procedure:  SECTION;  Surgeon: Edilma Zayas MD;  [facility]     COLONOSCOPY       ENDOMETRIAL SAMPLING (BIOPSY) N/A 2017    Procedure: ENDOMETRIAL SAMPLING (BIOPSY);  Endometrial Biopsy with Mirena Intrauterine Device Placement;  Surgeon: Samantha Sultana MD;  Location: [facility] OR     GYN SURGERY  2013    Fallopian Tubes Removed     HC AMNIOCENTESIS DIAGNOSTIC  2014     HC AMNIOCENTESIS DIAGNOSTIC  2014     hsg  11    blockage of both tubes     INSERT INTRAUTERINE DEVICE N/A 2017    Procedure: INSERT INTRAUTERINE DEVICE;;  Surgeon: Samantha Sultana MD;  [facility]     LAPAROSCOPIC GASTRIC SLEEVE  2012    Procedure: LAPAROSCOPIC GASTRIC  SLEEVE;  Laparoscopic Sleeve Gastrectomy and Hiatal Hernia Repair;  Surgeon: Eze Muhammad MD;  [facility]     LAPAROSCOPIC SALPINGECTOMY  5/7/2013    Procedure: LAPAROSCOPIC SALPINGECTOMY;  Operative Laparoscopy, lysis of adhesions, Bilateral Salpingectomies ;  Surgeon: Abdelrahman Corcoran MD;  Location: [facility] OR     U/S GUIDANCE FOR TVOR (CLINIC ONLY)  10/2013     Family History   Problem Relation Age of Onset     Diabetes Maternal Grandmother      Hypertension Maternal Grandmother      Mental Illness Maternal Grandmother      Cerebrovascular Disease Maternal Grandmother      Allergies Father      Hypertension Father      Prostate Cancer Father      Mental Illness Father      Obesity Father      Anxiety Disorder Mother      Mental Illness Mother      Respiratory Brother      Obesity Brother      Social History     Social History     Marital status:      Spouse name: N/A     Number of children: 1     Years of education: 18     Occupational History     professional development [employer]     Social History Main Topics     Smoking status: Never Smoker     Smokeless tobacco: Never Used     Alcohol use No      Comment: rarely     Drug use: No     Sexual activity: Yes     Partners: Male     Birth control/ protection: None      Comment: no fallopian tubes     Other Topics Concern     Parent/Sibling W/ Cabg, Mi Or Angioplasty Before 65f 55m? No     Social History Narrative       Current Outpatient Prescriptions:      ibuprofen (ADVIL/MOTRIN) 600 MG tablet, Take 1 tablet (600 mg) by mouth every 6 hours as needed for pain (mild), Disp: 30 tablet, Rfl: 0     sertraline (ZOLOFT) 100 MG tablet, Take 1 tablet (100 mg) by mouth daily, Disp: 30 tablet, Rfl: 0     VITAMIN D, CHOLECALCIFEROL, PO, Take by mouth daily, Disp: , Rfl:      Allergies   Allergen Reactions     No Known Drug Allergies        Past medical, surgical, social and family history were reviewed and updated in EPIC.      EXAM:  BP  "124/76  Pulse 76  Ht 5' 2\" (1.575 m)  Wt 246 lb (111.6 kg)  LMP 11/04/2018  BMI 44.99 kg/m2   BMI: Body mass index is 44.99 kg/(m^2).  Constitutional: healthy, alert and no distress  Head: Normocephalic. No masses, lesions, tenderness or abnormalities  Neck: Neck supple. Trachea midline. No adenopathy. Thyroid symmetric, normal size.   Cardiovascular: RRR.   Respiratory: Negative.   Breast: Breasts reveal mild symmetric fibrocystic densities, but there are no dominant, discrete, fixed or suspicious masses found.  Gastrointestinal: Abdomen soft, non-tender, non-distended. No masses, organomegaly.  :  Vulva:  No external lesions, normal female hair distribution, no inguinal adenopathy.    Urethra:  Midline, non-tender, well supported, no discharge  Vagina:  Moist, pink, old blood in vault, no lesions **difficult to see with large Aguillon  Uterus:  Normal size, IUD stings at os , non-tender, freely mobile  Ovaries:  No masses appreciated, exam limited by body habitus  Rectal Exam: deferred  Musculoskeletal: extremities normal  Skin: no suspicious lesions or rashes  Psychiatric: Affect appropriate, cooperative,mentation appears normal.     COUNSELING:   Reviewed preventive health counseling, as reflected in patient instructions   reports that she has never smoked. She has never used smokeless tobacco.    Body mass index is 44.99 kg/(m^2).  Weight management plan: She is already working on this and has had bariatric surgery.  FRAX Risk Assessment    ASSESSMENT:  43 year old female with satisfactory annual exam  (Z01.419) Encounter for gynecological examination without abnormal finding  (primary encounter diagnosis)  Comment: mirena IUD  Plan: Given mammogram information if she would like to schedule.    (F41.1) DENISE (generalized anxiety disorder)  Comment: Stable mood  Plan: sertraline (ZOLOFT) 100 MG tablet        Refill was done    (R87.810) Cervical high risk HPV (human papillomavirus) test positive  Comment: " Last Pap nil with other high risk HPV  Plan: Pap imaged thin layer diagnostic with HPV         (select HPV order below), HPV High Risk Types         DNA Cervical        Discussed if this Pap smear is nil with other high risk HPV, would need colposcopy done.  This was briefly discussed at today's visit.    (Z13.220) Screening for lipoid disorders  Plan: Lipid Profile        Return to clinic fasting    (Z13.29) Screening for thyroid disorder  Plan: TSH with free T4 reflex        Return to clinic for lab    (Z13.1) Screening for diabetes mellitus  Plan: Comprehensive metabolic panel, **A1C FUTURE 1yr        Return to clinic fasting    (Z13.0) Screening for iron deficiency anemia  Plan: CBC with platelets        Return to clinic for lab    Samantha Sultana MD

## 2018-12-03 NOTE — TELEPHONE ENCOUNTER
Pharmacy sent refill request for Sertraline 100mg. Pt has appt 12/3. Can get refill at appt.   Windy Aguillon RN-BSN

## 2018-12-03 NOTE — MR AVS SNAPSHOT
After Visit Summary   12/3/2018    Deisi Miner    MRN: [id number]           Patient Information     Date Of Birth          1975        Visit Information        Provider Department      12/3/2018 3:15 PM Samantha Sultana MD [facility]        Today's Diagnoses     Encounter for gynecological examination without abnormal finding    -  1    DENISE (generalized anxiety disorder)        Cervical high risk HPV (human papillomavirus) test positive        Screening for lipoid disorders        Screening for thyroid disorder        Screening for diabetes mellitus        Screening for iron deficiency anemia          Care Instructions    Make lab only appointment fasting (nothing but water 10 hours prior to blood draw) at any [facility].  And do mammogram same day          Follow-ups after your visit        Future tests that were ordered for you today     Open Future Orders        Priority Expected Expires Ordered    CBC with platelets Routine  6/3/2019 12/3/2018    Comprehensive metabolic panel Routine  6/3/2019 12/3/2018    TSH with free T4 reflex Routine  6/3/2019 12/3/2018    Lipid Profile Routine  6/3/2019 12/3/2018    **A1C FUTURE 1yr Routine 6/3/2019 12/3/2019 12/3/2018            Who to contact     If you have questions or need follow up information about today's clinic visit or your schedule please contact [facility] directly at [phone].  Normal or non-critical lab and imaging results will be communicated to you by MyChart, letter or phone within 4 business days after the clinic has received the results. If you do not hear from us within 7 days, please contact the clinic through MyChart or phone. If you have a critical or abnormal lab result, we will notify you by phone as soon as possible.  Submit refill requests through [employer] or call your pharmacy and they will forward the refill request to us. Please allow 3 business days for your refill to be  "completed.          Additional Information About Your Visit        MyChart Information     [employer] gives you secure access to your electronic health record. If you see a primary care provider, you can also send messages to your care team and make appointments. If you have questions, please call your primary care clinic.  If you do not have a primary care provider, please call [phone] and they will assist you.        Care EveryWhere ID     This is your Care EveryWhere ID. This could be used by other organizations to access your [place] medical records  [id number]        Your Vitals Were     Pulse Height Last Period BMI (Body Mass Index)          76 5' 2\" (1.575 m) 11/04/2018 44.99 kg/m2         Blood Pressure from Last 3 Encounters:   12/03/18 124/76   09/24/18 112/70   01/24/18 112/66    Weight from Last 3 Encounters:   12/03/18 246 lb (111.6 kg)   09/24/18 251 lb 8 oz (114.1 kg)   01/24/18 248 lb 9.6 oz (112.8 kg)              We Performed the Following     HPV High Risk Types DNA Cervical     Pap imaged thin layer diagnostic with HPV (select HPV order below)          Where to get your medicines      These medications were sent to [id number] - [place], [place] - [address]  [address] [address]     Phone:  [phone]     sertraline 100 MG tablet          Primary Care Provider Office Phone # Fax #    Alejandro Jasiel Gifford [phone]       [address] [address]        Equal Access to Services     RANDALL HARRY : Hadii aad ku hadasho Soomaali, waaxda luqadaha, qaybta kaalmada adeegyada, lilo schafer . So [facility] [phone].    ATENCIÓN: Si habla español, tiene a little disposición servicios gratuitos de asistencia lingüística. Llame al [phone].    We comply with applicable federal civil rights laws and [place] laws. We do not discriminate on the basis of race, color, national origin, age, disability, sex, " sexual orientation, or gender identity.            Thank you!     Thank you for choosing [facility]  for your care. Our goal is always to provide you with excellent care. Hearing back from our patients is one way we can continue to improve our services. Please take a few minutes to complete the written survey that you may receive in the mail after your visit with us. Thank you!             Your Updated Medication List - Protect others around you: Learn how to safely use, store and throw away your medicines at www.disposemymeds.org.          This list is accurate as of 12/3/18  3:49 PM.  Always use your most recent med list.                   Brand Name Dispense Instructions for use Diagnosis    ibuprofen 600 MG tablet    ADVIL/MOTRIN    30 tablet    Take 1 tablet (600 mg) by mouth every 6 hours as needed for pain (mild)    Encounter for insertion of mirena IUD       sertraline 100 MG tablet    ZOLOFT    90 tablet    Take 1 tablet (100 mg) by mouth daily    DENISE (generalized anxiety disorder)       VITAMIN D (CHOLECALCIFEROL) PO      Take by mouth daily

## 2018-12-03 NOTE — NURSING NOTE
"Chief Complaint   Patient presents with     Physical       Initial /76  Pulse 76  Ht 5' 2\" (1.575 m)  Wt 246 lb (111.6 kg)  LMP 2018  BMI 44.99 kg/m2 Estimated body mass index is 44.99 kg/(m^2) as calculated from the following:    Height as of this encounter: 5' 2\" (1.575 m).    Weight as of this encounter: 246 lb (111.6 kg).  BP completed using cuff size: regular    Questioned patient about current smoking habits.  Pt. has never smoked.          The following HM Due: NONE      The following patient reported/Care Every where data was sent to:  P ABSTRACT QUALITY INITIATIVES [[id number]]        N/a        Bharti Mancera MA              "

## 2018-12-03 NOTE — PATIENT INSTRUCTIONS
Make lab only appointment fasting (nothing but water 10 hours prior to blood draw) at any [facility].  And do mammogram same day

## 2018-12-04 DIAGNOSIS — F41.1 GAD (GENERALIZED ANXIETY DISORDER): ICD-10-CM

## 2018-12-04 RX ORDER — SERTRALINE HYDROCHLORIDE 100 MG/1
100 TABLET, FILM COATED ORAL DAILY
Qty: 90 TABLET | Refills: 3 | Status: SHIPPED | OUTPATIENT
Start: 2018-12-04 | End: 2020-01-23

## 2018-12-04 NOTE — TELEPHONE ENCOUNTER
Signed Prescriptions:                        Disp   Refills    sertraline (ZOLOFT) 100 MG tablet          90 tab*3        Sig: Take 1 tablet (100 mg) by mouth daily  Authorizing Provider: DANE BELL  Ordering User: RENA TALLEY    Rx request to a different pharmacy. Rx sent.  Rena Talley

## 2018-12-07 LAB
COPATH REPORT: NORMAL
PAP: NORMAL

## 2018-12-10 LAB
FINAL DIAGNOSIS: NORMAL
HPV HR 12 DNA CVX QL NAA+PROBE: NEGATIVE
HPV16 DNA SPEC QL NAA+PROBE: NEGATIVE
HPV18 DNA SPEC QL NAA+PROBE: NEGATIVE
SPECIMEN DESCRIPTION: NORMAL
SPECIMEN SOURCE CVX/VAG CYTO: NORMAL

## 2019-05-06 ENCOUNTER — OFFICE VISIT (OUTPATIENT)
Dept: PODIATRY | Facility: CLINIC | Age: 44
End: 2019-05-06
Payer: COMMERCIAL

## 2019-05-06 VITALS
BODY MASS INDEX: 45.27 KG/M2 | DIASTOLIC BLOOD PRESSURE: 70 MMHG | WEIGHT: 246 LBS | HEIGHT: 62 IN | SYSTOLIC BLOOD PRESSURE: 120 MMHG

## 2019-05-06 DIAGNOSIS — M72.2 PLANTAR FASCIAL FIBROMATOSIS: Primary | ICD-10-CM

## 2019-05-06 PROCEDURE — 99213 OFFICE O/P EST LOW 20 MIN: CPT | Mod: 25 | Performed by: PODIATRIST

## 2019-05-06 PROCEDURE — 20550 NJX 1 TENDON SHEATH/LIGAMENT: CPT | Performed by: PODIATRIST

## 2019-05-06 RX ORDER — DEXAMETHASONE SODIUM PHOSPHATE 4 MG/ML
INJECTION, SOLUTION INTRA-ARTICULAR; INTRALESIONAL; INTRAMUSCULAR; INTRAVENOUS; SOFT TISSUE
Qty: 30 ML | Refills: 0 | Status: SHIPPED | OUTPATIENT
Start: 2019-05-06 | End: 2020-03-02

## 2019-05-06 ASSESSMENT — MIFFLIN-ST. JEOR: SCORE: 1724.1

## 2019-05-06 NOTE — PROGRESS NOTES
"Foot & Ankle Surgery   May 6, 2019    S:  Pt is seen today for evaluation of bilateral lower extremity heel pain R>>L.  I saw her 9/24/18 and she underwent bilateral PF steroid injection.  She felt better for a while but the injections wore off.  The right heel is much more sore than the left.  She's frustrated that the initial therapies have not helped overall. She needs \"to fix this\".      Vitals:    05/06/19 0843   BP: 120/70   Weight: 111.6 kg (246 lb)   Height: 1.575 m (5' 2\")   '      ROS - Pos for CC.  Patient denies current nausea, vomiting, chills, fevers, belly pain, calf pain, chest pain or SOB.  Complete remainder of ROS it otherwise neg.      PE:  Gen:   No apparent distress  Eye:    Visual scanning without deficit  Ear:    Response to auditory stimuli wnl  Lung:    Non-labored breathing on RA noted  Abd:    NTND per patient report  Lymph:    Neg for pitting/non-pitting edema BLE  Vasc:    Pulses palpable, CFT minimally delayed  Neuro:    Light touch sensation intact to all sensory nerve distributions without paresthesias  Derm:    Neg for nodules, lesions or ulcerations  MSK:    right lower extremity - pain at plantar medial heel > ICN, while left lower extremity shoes pain at plantar-medial heel with minimal calcaneal compression pain  Calf:    Neg for redness, swelling or tenderness    Assessment:  44 year old female with bilateral R>>L plantar fasciitis       Plan:  Discussed etiologies, anatomy and options  1.  Bilateral R>>L plantar fasciitis   -reviewed tier 1 therapies; continue all  -she inquired into a night splint.  We discussed the splint, as well as 5 min of morning stretches while still in bed. She'll start the latter  -discussed tier 2 options with the patient  -CELESTE PT referra; ionto/dex  -walking cast boot right lower extremity with instructions  -repeat steroid injection today, see procedure note    Regarding the CAM walker, the following proper-usage instructions were discussed and " dispensed:  1.  Do not sleep with the CAM boot on; 2.  Do not wear during long-distance travel, ie long car rides, plane trips(they were instructed not to drive with it if the involved foot is required to operate a vehicle); 3.  The patient was encouraged to remove the boot throughout the day when off their feet;  4.  They are to have the boot on when ambulating, regardless of WB status    After obtaining written consent, the skin was prepped with alcohol.  The needle was advance to the underlying plantar fascial insertion, aspiration was done with position change.  1 1/2cc mixture of 2:1 kenalog 40:0.25% marcaine plain was injected.  The patient tolerated the procedure without complication.  Risks that were discussed included possible joint/soft tissue damage, neuritis/numbness, infection, pigment change, steroid flare.       Follow up:  Prn based on injection or sooner with acute issues      Body mass index is 44.99 kg/m .  Weight management plan: Patient was referred to their PCP to discuss a diet and exercise plan.         Evin Rubio DPM FACFAS FACFAOM  Podiatric Foot & Ankle Surgeon  [facility]  [phone]

## 2019-05-10 ENCOUNTER — ANCILLARY PROCEDURE (OUTPATIENT)
Dept: MAMMOGRAPHY | Facility: CLINIC | Age: 44
End: 2019-05-10
Attending: OBSTETRICS & GYNECOLOGY
Payer: COMMERCIAL

## 2019-05-10 DIAGNOSIS — Z12.31 VISIT FOR SCREENING MAMMOGRAM: ICD-10-CM

## 2019-05-10 PROCEDURE — 77067 SCR MAMMO BI INCL CAD: CPT | Mod: TC | Performed by: INTERNAL MEDICINE

## 2019-05-13 ENCOUNTER — THERAPY VISIT (OUTPATIENT)
Dept: PHYSICAL THERAPY | Facility: CLINIC | Age: 44
End: 2019-05-13
Payer: COMMERCIAL

## 2019-05-13 DIAGNOSIS — M72.2 PLANTAR FASCIAL FIBROMATOSIS: ICD-10-CM

## 2019-05-13 DIAGNOSIS — M79.671 RIGHT FOOT PAIN: ICD-10-CM

## 2019-05-13 PROCEDURE — 97110 THERAPEUTIC EXERCISES: CPT | Mod: GP | Performed by: PHYSICAL THERAPIST

## 2019-05-13 PROCEDURE — 97161 PT EVAL LOW COMPLEX 20 MIN: CPT | Mod: GP | Performed by: PHYSICAL THERAPIST

## 2019-05-13 NOTE — PROGRESS NOTES
[facility] Initial Evaluation  Subjective:  The history is provided by the patient. No  was used.   Deisi Miner is a 44 year old female with a right ankle condition.  Condition occurred with:  Insidious onset.  Condition occurred: for unknown reasons.  This is a chronic condition  Patient reports a gradual onset of heel/arch pain beginning in September 2019 when she started a new job being on her feet.  Patient c/o pain with prolong walking and standing.  Physical therapy was ordered 5/6/2019.  Symptoms have nearly resolved since a second injection on 5/6/2019 and using a boot.  Symptoms returned a couple months after first injection.    Patient reports pain:  Medial calcaneal tuberosity and longitudinal arch.    Pain is described as aching and is constant and reported as 5/10.   Pain is the same all the time.  Symptoms are exacerbated by walking and standing and relieved by rest and other (boot).  Since onset symptoms are rapidly improving (since injection).  Special testing: none.  Previous treatment includes other (injection 2x).  There was significant improvement following previous treatment.  General health as reported by patient is good.  Pertinent medical history includes:  Depression and overweight.  Medical allergies: no.  Other surgeries include:  Other.  Current medications:  Anti-depressants.  Current occupation is  .    Primary job tasks include:  Prolonged standing.    Barriers include:  None as reported by patient.    Red flags:  None as reported by patient.                        Objective:  Standing Alignment:                Ankle/Foot:  Pes planus L and pes planus R    Gait:  Hyperpronation, non-antalgic  Assistive Devices:  CAM and none      Flexibility/Screens:       Lower Extremity:  Decreased left lower extremity flexibility:Gastroc and Soleus    Decreased right lower extremity flexibility:  Gastroc and Soleus          Ankle/Foot  Evaluation  ROM:  AROM is normal.PROM is normal.      Strength:    Dorsiflexion:  Left: 5/5     Pain:   Right: 5/5   Pain:  Plantarflexion: Left: 5/5   Pain:   Right: 5/5  Pain:  Inversion:Left: 5/5  Pain:     Right: 5/5  Pain:  Eversion:Left: 5/5  Pain:  Right: 5/5  Pain:              Strength wnl ankle: Foot intrinsics 4+/5.      PALPATION:       Right ankle tenderness not present at:  plantar fascia or medial calcaneal  EDEMA: normal                                                              General     ROS    Assessment/Plan:    Patient is a 44 year old female with right foot complaints.    Patient has the following significant findings with corresponding treatment plan.                Diagnosis 1:  Plantar fascitis   Pain -  self management, education and home program  Decreased ROM/flexibility - manual therapy, therapeutic exercise, therapeutic activity and home program  Decreased strength - therapeutic exercise, therapeutic activities and home program  Impaired muscle performance - neuro re-education and home program  Decreased function - therapeutic activities and home program    Therapy Evaluation Codes:   1) History comprised of:   Personal factors that impact the plan of care:      None.    Comorbidity factors that impact the plan of care are:      Depression and Overweight.     Medications impacting care: Anti-depressant.  2) Examination of Body Systems comprised of:   Body structures and functions that impact the plan of care:      Foot.   Activity limitations that impact the plan of care are:      Standing and Walking.  3) Clinical presentation characteristics are:   Stable/Uncomplicated.  4) Decision-Making    Low complexity using standardized patient assessment instrument and/or measureable assessment of functional outcome.  Cumulative Therapy Evaluation is: Low complexity.    Previous and current functional limitations:  (See Goal Flow Sheet for this information)    Short term and Long term goals:  (See Goal Flow Sheet for this information)     Communication ability:  Patient appears to be able to clearly communicate and understand verbal and written communication and follow directions correctly.  Treatment Explanation - The following has been discussed with the patient:   RX ordered/plan of care  Anticipated outcomes  Possible risks and side effects  This patient would benefit from PT intervention to resume normal activities.   Rehab potential is good.    Frequency:  1 X week, once daily  Duration:  for 8 weeks  Discharge Plan:  Achieve all LTG.  Independent in home treatment program.  Reach maximal therapeutic benefit.    Please refer to the daily flowsheet for treatment today, total treatment time and time spent performing 1:1 timed codes.

## 2019-05-23 ENCOUNTER — THERAPY VISIT (OUTPATIENT)
Dept: PHYSICAL THERAPY | Facility: CLINIC | Age: 44
End: 2019-05-23
Payer: COMMERCIAL

## 2019-05-23 DIAGNOSIS — M79.671 RIGHT FOOT PAIN: ICD-10-CM

## 2019-05-23 PROCEDURE — 97112 NEUROMUSCULAR REEDUCATION: CPT | Mod: GP | Performed by: PHYSICAL THERAPIST

## 2019-05-23 PROCEDURE — 97110 THERAPEUTIC EXERCISES: CPT | Mod: GP | Performed by: PHYSICAL THERAPIST

## 2019-05-31 NOTE — PROGRESS NOTES
05/01/17: NIL pap, + HR HPV (not 16 or 18) result. Plan cotest in 1 year.  4/17/18 Cotest reminder letter sent (rl)  12/03/18: NIL pap, Neg HR HPV result. Plan cotest in 3 years. Mychart result letter sent to the pt via [employer].    gradual onset

## 2019-07-03 ENCOUNTER — THERAPY VISIT (OUTPATIENT)
Dept: PHYSICAL THERAPY | Facility: CLINIC | Age: 44
End: 2019-07-03
Payer: COMMERCIAL

## 2019-07-03 DIAGNOSIS — M79.671 RIGHT FOOT PAIN: ICD-10-CM

## 2019-07-03 PROCEDURE — 97033 APP MDLTY 1+IONTPHRSIS EA 15: CPT | Mod: GP | Performed by: PHYSICAL THERAPIST

## 2019-07-03 PROCEDURE — 97110 THERAPEUTIC EXERCISES: CPT | Mod: GP | Performed by: PHYSICAL THERAPIST

## 2019-07-08 ENCOUNTER — THERAPY VISIT (OUTPATIENT)
Dept: PHYSICAL THERAPY | Facility: CLINIC | Age: 44
End: 2019-07-08
Payer: COMMERCIAL

## 2019-07-08 DIAGNOSIS — M79.671 RIGHT FOOT PAIN: ICD-10-CM

## 2019-07-08 PROCEDURE — 97110 THERAPEUTIC EXERCISES: CPT | Mod: GP | Performed by: PHYSICAL THERAPIST

## 2019-07-08 PROCEDURE — 97033 APP MDLTY 1+IONTPHRSIS EA 15: CPT | Mod: GP | Performed by: PHYSICAL THERAPIST

## 2019-07-15 ENCOUNTER — THERAPY VISIT (OUTPATIENT)
Dept: PHYSICAL THERAPY | Facility: CLINIC | Age: 44
End: 2019-07-15
Payer: COMMERCIAL

## 2019-07-15 DIAGNOSIS — M79.671 RIGHT FOOT PAIN: ICD-10-CM

## 2019-07-15 PROCEDURE — 97033 APP MDLTY 1+IONTPHRSIS EA 15: CPT | Mod: GP | Performed by: PHYSICAL THERAPIST

## 2019-07-15 PROCEDURE — 97140 MANUAL THERAPY 1/> REGIONS: CPT | Mod: GP | Performed by: PHYSICAL THERAPIST

## 2019-07-15 PROCEDURE — 97110 THERAPEUTIC EXERCISES: CPT | Mod: GP | Performed by: PHYSICAL THERAPIST

## 2019-07-22 ENCOUNTER — THERAPY VISIT (OUTPATIENT)
Dept: PHYSICAL THERAPY | Facility: CLINIC | Age: 44
End: 2019-07-22
Payer: COMMERCIAL

## 2019-07-22 DIAGNOSIS — M79.671 RIGHT FOOT PAIN: ICD-10-CM

## 2019-07-22 PROCEDURE — 97110 THERAPEUTIC EXERCISES: CPT | Mod: GP | Performed by: PHYSICAL THERAPIST

## 2019-07-22 PROCEDURE — 97033 APP MDLTY 1+IONTPHRSIS EA 15: CPT | Mod: GP | Performed by: PHYSICAL THERAPIST

## 2019-09-09 NOTE — PROGRESS NOTES
Discharge Note    Progress reporting period is from initial evaluation date (please see noted date below) to Jul 22, 2019.  Linked Episodes   Type: Episode: Status: Noted: Resolved: Last update: Updated by:   PHYSICAL THERAPY (R)Foot05/13/2019 Active 5/13/2019 7/22/2019  3:00 PM Iglesia Simon, PT      Comments:       Deisi failed to follow up and current status is unknown.  Please see information below for last relevant information on current status.  Patient seen for 6 visits.    SUBJECTIVE  Subjective changes noted by patient:  Symptoms are improving.  She has been pushing into pain more aggressively with a foot roller at home, and this is helping.  Changes in function:  Yes (See Goal flowsheet attached for changes in current functional level)  Adverse reaction to treatment or activity: None    OBJECTIVE  Changes noted in objective findings:       ASSESSMENT/PLAN  Diagnosis: R Foot   Updated problem list and treatment plan:   Pain - HEP  Decreased ROM/flexibility - HEP  Decreased function - HEP  Decreased strength - HEP  STG/LTGs have been met or progress has been made towards goals:  Yes, please see goal flowsheet for most current information  Assessment of Progress: current status is unknown.    Last current status:     Self Management Plans:  HEP  I have re-evaluated this patient and find that the nature, scope, duration and intensity of the therapy is appropriate for the medical condition of the patient.  Deisi continues to require the following intervention to meet STG and LTG's:  HEP.    Recommendations:  Discharge with current home program.  Patient to follow up with MD as needed.    Please refer to the daily flowsheet for treatment today, total treatment time and time spent performing 1:1 timed codes.

## 2019-10-03 ENCOUNTER — HEALTH MAINTENANCE LETTER (OUTPATIENT)
Age: 44
End: 2019-10-03

## 2020-01-23 DIAGNOSIS — F41.1 GAD (GENERALIZED ANXIETY DISORDER): ICD-10-CM

## 2020-01-23 RX ORDER — SERTRALINE HYDROCHLORIDE 100 MG/1
100 TABLET, FILM COATED ORAL DAILY
Qty: 90 TABLET | Refills: 0 | Status: SHIPPED | OUTPATIENT
Start: 2020-01-23 | End: 2020-03-02

## 2020-01-23 NOTE — TELEPHONE ENCOUNTER
Pending Prescriptions:                       Disp   Refills    sertraline (ZOLOFT) 100 MG tablet         90 tab*0            Sig: Take 1 tablet (100 mg) by mouth daily    Due for AE - scheduled. Rx sent.  Rena Park RN

## 2020-02-08 ENCOUNTER — HEALTH MAINTENANCE LETTER (OUTPATIENT)
Age: 45
End: 2020-02-08

## 2020-03-02 ENCOUNTER — OFFICE VISIT (OUTPATIENT)
Dept: OBGYN | Facility: CLINIC | Age: 45
End: 2020-03-02
Payer: COMMERCIAL

## 2020-03-02 VITALS
HEART RATE: 84 BPM | OXYGEN SATURATION: 98 % | HEIGHT: 62 IN | BODY MASS INDEX: 44.7 KG/M2 | WEIGHT: 242.9 LBS | SYSTOLIC BLOOD PRESSURE: 122 MMHG | DIASTOLIC BLOOD PRESSURE: 86 MMHG

## 2020-03-02 DIAGNOSIS — Z11.3 ROUTINE SCREENING FOR STI (SEXUALLY TRANSMITTED INFECTION): ICD-10-CM

## 2020-03-02 DIAGNOSIS — Z01.419 ENCOUNTER FOR GYNECOLOGICAL EXAMINATION WITHOUT ABNORMAL FINDING: Primary | ICD-10-CM

## 2020-03-02 DIAGNOSIS — Z13.1 SCREENING FOR DIABETES MELLITUS: ICD-10-CM

## 2020-03-02 DIAGNOSIS — Z13.29 SCREENING FOR THYROID DISORDER: ICD-10-CM

## 2020-03-02 DIAGNOSIS — F41.1 GAD (GENERALIZED ANXIETY DISORDER): ICD-10-CM

## 2020-03-02 DIAGNOSIS — Z12.4 PAP SMEAR FOR CERVICAL CANCER SCREENING: ICD-10-CM

## 2020-03-02 DIAGNOSIS — Z13.220 SCREENING FOR LIPOID DISORDERS: ICD-10-CM

## 2020-03-02 DIAGNOSIS — M25.561 CHRONIC PAIN OF RIGHT KNEE: ICD-10-CM

## 2020-03-02 DIAGNOSIS — G89.29 CHRONIC PAIN OF RIGHT KNEE: ICD-10-CM

## 2020-03-02 LAB
ERYTHROCYTE [DISTWIDTH] IN BLOOD BY AUTOMATED COUNT: 16.2 % (ref 10–15)
HBA1C MFR BLD: 5.7 % (ref 0–5.6)
HBV SURFACE AG SERPL QL IA: NONREACTIVE
HCT VFR BLD AUTO: 37.5 % (ref 35–47)
HCV AB SERPL QL IA: NONREACTIVE
HGB BLD-MCNC: 12.3 G/DL (ref 11.7–15.7)
HIV 1+2 AB+HIV1 P24 AG SERPL QL IA: NONREACTIVE
MCH RBC QN AUTO: 26.7 PG (ref 26.5–33)
MCHC RBC AUTO-ENTMCNC: 32.8 G/DL (ref 31.5–36.5)
MCV RBC AUTO: 81 FL (ref 78–100)
PLATELET # BLD AUTO: 302 10E9/L (ref 150–450)
RBC # BLD AUTO: 4.61 10E12/L (ref 3.8–5.2)
TSH SERPL DL<=0.005 MIU/L-ACNC: 2.51 MU/L (ref 0.4–4)
WBC # BLD AUTO: 8.1 10E9/L (ref 4–11)

## 2020-03-02 PROCEDURE — 86780 TREPONEMA PALLIDUM: CPT | Performed by: OBSTETRICS & GYNECOLOGY

## 2020-03-02 PROCEDURE — 87340 HEPATITIS B SURFACE AG IA: CPT | Performed by: OBSTETRICS & GYNECOLOGY

## 2020-03-02 PROCEDURE — 83036 HEMOGLOBIN GLYCOSYLATED A1C: CPT | Performed by: OBSTETRICS & GYNECOLOGY

## 2020-03-02 PROCEDURE — 85027 COMPLETE CBC AUTOMATED: CPT | Performed by: OBSTETRICS & GYNECOLOGY

## 2020-03-02 PROCEDURE — 87624 HPV HI-RISK TYP POOLED RSLT: CPT | Performed by: OBSTETRICS & GYNECOLOGY

## 2020-03-02 PROCEDURE — 87591 N.GONORRHOEAE DNA AMP PROB: CPT | Performed by: OBSTETRICS & GYNECOLOGY

## 2020-03-02 PROCEDURE — 87389 HIV-1 AG W/HIV-1&-2 AB AG IA: CPT | Performed by: OBSTETRICS & GYNECOLOGY

## 2020-03-02 PROCEDURE — 87491 CHLMYD TRACH DNA AMP PROBE: CPT | Performed by: OBSTETRICS & GYNECOLOGY

## 2020-03-02 PROCEDURE — 99396 PREV VISIT EST AGE 40-64: CPT | Performed by: OBSTETRICS & GYNECOLOGY

## 2020-03-02 PROCEDURE — 84443 ASSAY THYROID STIM HORMONE: CPT | Performed by: OBSTETRICS & GYNECOLOGY

## 2020-03-02 PROCEDURE — 88175 CYTOPATH C/V AUTO FLUID REDO: CPT | Performed by: OBSTETRICS & GYNECOLOGY

## 2020-03-02 PROCEDURE — 86803 HEPATITIS C AB TEST: CPT | Performed by: OBSTETRICS & GYNECOLOGY

## 2020-03-02 PROCEDURE — 36415 COLL VENOUS BLD VENIPUNCTURE: CPT | Performed by: OBSTETRICS & GYNECOLOGY

## 2020-03-02 RX ORDER — SERTRALINE HYDROCHLORIDE 100 MG/1
100 TABLET, FILM COATED ORAL DAILY
Qty: 90 TABLET | Refills: 4 | Status: SHIPPED | OUTPATIENT
Start: 2020-03-02 | End: 2021-07-09

## 2020-03-02 SDOH — HEALTH STABILITY: MENTAL HEALTH: HOW OFTEN DO YOU HAVE A DRINK CONTAINING ALCOHOL?: NEVER

## 2020-03-02 ASSESSMENT — PATIENT HEALTH QUESTIONNAIRE - PHQ9
SUM OF ALL RESPONSES TO PHQ QUESTIONS 1-9: 10
5. POOR APPETITE OR OVEREATING: NEARLY EVERY DAY

## 2020-03-02 ASSESSMENT — ANXIETY QUESTIONNAIRES
GAD7 TOTAL SCORE: 12
6. BECOMING EASILY ANNOYED OR IRRITABLE: MORE THAN HALF THE DAYS
1. FEELING NERVOUS, ANXIOUS, OR ON EDGE: NEARLY EVERY DAY
7. FEELING AFRAID AS IF SOMETHING AWFUL MIGHT HAPPEN: NOT AT ALL
3. WORRYING TOO MUCH ABOUT DIFFERENT THINGS: MORE THAN HALF THE DAYS
5. BEING SO RESTLESS THAT IT IS HARD TO SIT STILL: SEVERAL DAYS
2. NOT BEING ABLE TO STOP OR CONTROL WORRYING: SEVERAL DAYS

## 2020-03-02 ASSESSMENT — MIFFLIN-ST. JEOR: SCORE: 1705.04

## 2020-03-02 NOTE — PROGRESS NOTES
Deisi is a 45 year old  female who presents for annual exam.     Menses are regular q 28-30 days and normal lasting 2 days.  Menses flow: light.  No LMP recorded.. Using Mirena IUD  for contraception.  She is not currently considering pregnancy.  Besides routine health maintenance, she has no other health concerns today . Is associate principle.  Daughter is 6 y/o now. Wt down 4 lbs since last visit.  Taking Zoloft 100 mg daily and she thinks her mood is okay.  PHQ= 10 and melanie=12.  Requests STI testing and did not return to clinic for fasting labs last year.  Has been having right knee pain.  GYNECOLOGIC HISTORY:  Menarche:   Deisi is sexually active with 1male partner(s) and is currently in monogamous relationship.    History sexually transmitted infections:HPV  STI testing offered?  Accepted  BOB exposure: Unknown  History of abnormal Pap smear: YES - updated in Problem List and Health Maintenance accordingly  Family history of breast CA: No  Family history of uterine/ovarian CA: No    Family history of colon CA: No    HEALTH MAINTENANCE:  Cholesterol: (  Cholesterol   Date Value Ref Range Status   2015 181 <200 mg/dL Final     Comment:     LDL Cholesterol is the primary guide to therapy.   The NCEP recommends further evaluation of: patients with cholesterol greater   than 200 mg/dL if additional risk factors are present, cholesterol greater   than   240 mg/dL, triglycerides greater than 150 mg/dL, or HDL less than 40 mg/dL.     2014 152 <200 mg/dL Final     Comment:     LDL Cholesterol is the primary guide to therapy.   The NCEP recommends further evaluation of: patients with cholesterol greater   than 200 mg/dL if additional risk factors are present, cholesterol greater   than   240 mg/dL, triglycerides greater than 150 mg/dL, or HDL less than 40 mg/dL.      History of abnormal lipids: No  Mammo: 5/10/19 . History of abnormal Mammo: Not applicable.  Regular Self Breast Exams:  Yes  Calcium/Vitamin D intake: source:  dairy, daily vitamin Adequate? Yes  TSH: (  TSH   Date Value Ref Range Status   2014 0.69 0.40 - 4.00 mU/L Final     Comment:     Effective 2014, the reference range for this assay has changed to reflect   new instrumentation/methodology.      )  Pap; (  Lab Results   Component Value Date    PAP NIL 2018    PAP NIL 2017    PAP NIL 2012    )    HISTORY:  OB History    Para Term  AB Living   1 1 0 1 0 1   SAB TAB Ectopic Multiple Live Births   0 0 0 1 2      # Outcome Date GA Lbr Artur/2nd Weight Sex Delivery Anes PTL Lv   1A  10/18/14 34w1d  1.25 kg (2 lb 12.1 oz) M CS-LTranv Spinal  ND      Name: Altaf      Apgar1: 6  Apgar5: 7   1B  10/18/14 34w1d  1.3 kg (2 lb 13.9 oz) F CS-LTranv Spinal  KARI      Name: Sera      Apgar1: 8  Apgar5: 9     Past Medical History:   Diagnosis Date     Arthritis      Cancer (H)      Cervical high risk HPV (human papillomavirus) test positive 2017: NIL pap, + HR HPV (not 16 or 18) result.      Depression     anxiety, sees therapist     Diabetes (H)      Hypertension      Obesity      Past Surgical History:   Procedure Laterality Date     C IUD,MIRENA  2017      SECTION N/A 10/18/2014    Procedure:  SECTION;  Surgeon: Edilma Zayas MD;  Location: [facility] L[facility]     COLONOSCOPY       ENDOMETRIAL SAMPLING (BIOPSY) N/A 2017    Procedure: ENDOMETRIAL SAMPLING (BIOPSY);  Endometrial Biopsy with Mirena Intrauterine Device Placement;  Surgeon: Samantha Sultana MD;  Location: [facility] OR     GYN SURGERY  2013    Fallopian Tubes Removed     HC AMNIOCENTESIS DIAGNOSTIC  2014     HC AMNIOCENTESIS DIAGNOSTIC  2014     hsg  11    blockage of both tubes     INSERT INTRAUTERINE DEVICE N/A 2017    Procedure: INSERT INTRAUTERINE DEVICE;;  Surgeon: Samantha Sultana MD;  Location: [facility]     LAPAROSCOPIC GASTRIC SLEEVE  2012     Procedure: LAPAROSCOPIC GASTRIC SLEEVE;  Laparoscopic Sleeve Gastrectomy and Hiatal Hernia Repair;  Surgeon: Eze Muhammad MD;  [facility]: [facility]     LAPAROSCOPIC SALPINGECTOMY  5/7/2013    Procedure: LAPAROSCOPIC SALPINGECTOMY;  Operative Laparoscopy, lysis of adhesions, Bilateral Salpingectomies ;  Surgeon: Abdelrahman Corcoran MD;  Location:  OR     U/S GUIDANCE FOR TVOR (CLINIC ONLY)  10/2013     Family History   Problem Relation Age of Onset     Diabetes Maternal Grandmother      Hypertension Maternal Grandmother      Mental Illness Maternal Grandmother      Cerebrovascular Disease Maternal Grandmother      Allergies Father      Hypertension Father      Prostate Cancer Father      Mental Illness Father      Obesity Father      Anxiety Disorder Mother      Mental Illness Mother      Respiratory Brother      Obesity Brother      Social History     Socioeconomic History     Marital status:      Spouse name: Not on file     Number of children: 1     Years of education: 18     Highest education level: Not on file   Occupational History     Occupation: professional development     Employer: [employer]     Comment: middle school   Social Needs     Financial resource strain: Not on file     Food insecurity:     Worry: Not on file     Inability: Not on file     Transportation needs:     Medical: Not on file     Non-medical: Not on file   Tobacco Use     Smoking status: Never Smoker     Smokeless tobacco: Never Used   Substance and Sexual Activity     Alcohol use: No     Comment: rarely     Drug use: No     Sexual activity: Yes     Partners: Male     Birth control/protection: None     Comment: no fallopian tubes   Lifestyle     Physical activity:     Days per week: Not on file     Minutes per session: Not on file     Stress: Not on file   Relationships     Social connections:     Talks on phone: Not on file     Gets together: Not on file     Attends Catholic service: Not on file     Active  "member of club or organization: Not on file     Attends meetings of clubs or organizations: Not on file     Relationship status: Not on file     Intimate partner violence:     Fear of current or ex partner: Not on file     Emotionally abused: Not on file     Physically abused: Not on file     Forced sexual activity: Not on file   Other Topics Concern     Parent/sibling w/ CABG, MI or angioplasty before 65F 55M? No   Social History Narrative     Not on file       Current Outpatient Medications:      Multiple Vitamin (DAILY VITAMIN PO), , Disp: , Rfl:      Probiotic Product (PROBIOTIC PO), , Disp: , Rfl:      sertraline (ZOLOFT) 100 MG tablet, Take 1 tablet (100 mg) by mouth daily, Disp: 90 tablet, Rfl: 4     ibuprofen (ADVIL/MOTRIN) 600 MG tablet, Take 1 tablet (600 mg) by mouth every 6 hours as needed for pain (mild), Disp: 30 tablet, Rfl: 0     Allergies   Allergen Reactions     No Known Drug Allergies        Past medical, surgical, social and family history were reviewed and updated in EPIC.    EXAM:  /86   Pulse 84   Ht 1.575 m (5' 2\")   Wt 110.2 kg (242 lb 14.4 oz)   SpO2 98%   BMI 44.43 kg/m     BMI: Body mass index is 44.43 kg/m .  Constitutional: healthy, alert and no distress  Head: Normocephalic. No masses, lesions, tenderness or abnormalities  Neck: Neck supple. Trachea midline. No adenopathy. Thyroid symmetric, normal size.   Cardiovascular: RRR.   Respiratory: Negative.   Breast: Breasts reveal mild symmetric fibrocystic densities, but there are no dominant, discrete, fixed or suspicious masses found.  Gastrointestinal: Abdomen soft, non-tender, non-distended. No masses, organomegaly.  :  Vulva:  No external lesions, normal female hair distribution, no inguinal adenopathy.    Urethra:  Midline, non-tender, well supported, no discharge  Vagina:  Moist, pink, no abnormal discharge, no lesions  Uterus/Adnexae: exam is limited secondary to body habitus  No masses appreciated  Rectal Exam: " deferred  Musculoskeletal: extremities normal  Skin: no suspicious lesions or rashes  Psychiatric: Affect appropriate, cooperative,mentation appears normal.     COUNSELING:   Reviewed preventive health counseling, as reflected in patient instructions       Regular exercise       Healthy diet/nutrition   reports that she has never smoked. She has never used smokeless tobacco.    Body mass index is 44.43 kg/m .  Weight management plan: Working on weight loss and has not increased in over 2 years  FRAX Risk Assessment    ASSESSMENT:  45 year old female with satisfactory annual exam  (Z01.419) Encounter for gynecological examination without abnormal finding  (primary encounter diagnosis)  Comment: Mirena 7/17  Plan: CBC with platelets        Had mammogram done.    (F41.1) DENISE (generalized anxiety disorder)  Comment: Stable  Plan: sertraline (ZOLOFT) 100 MG tablet        We will recheck her mood in about 6 weeks and discussed possibly adding Wellbutrin if needed.    (Z11.3) Routine screening for STI (sexually transmitted infection)  Comment: Patient request  Plan: Hepatitis B surface antigen, Hepatitis C         antibody, HIV Antigen Antibody Combo, NEISSERIA        GONORRHOEA PCR, CHLAMYDIA TRACHOMATIS PCR,         Treponema Abs w Reflex to RPR and Titer        We will let her know lab results when available    (Z13.1) Screening for diabetes mellitus  Comment: Return to clinic fasting for glucose check A1c today  Plan: Hemoglobin A1c, Glucose    (Z13.29) Screening for thyroid disorder  Plan: TSH with free T4 reflex        Check labs today    (Z12.4) Pap smear for cervical cancer screening  Plan: HPV High Risk Types DNA Cervical, Pap imaged         thin layer screen with HPV - recommended age 30        - 65 years (select HPV order below)        Discussed sending pap smear for HPV typing as well and that if both pap and HPV are negative, then can have q5 year pap smears.    (Z13.220) Screening for lipoid  disorders  Comment: Return to clinic fasting  Plan: Lipid Profile        Needs to return  fasting for lab    (M25.561,  G89.29) Chronic pain of right knee  Plan: [facility] PT, HAND, AND CHIROPRACTIC REFERRAL        Refer to physical therapy for exercises for her knee.      Samantha Sultana MD

## 2020-03-02 NOTE — PATIENT INSTRUCTIONS
Make lab only appointment fasting (nothing but water 10 hours prior to blood draw) at any [facility].

## 2020-03-03 LAB
C TRACH DNA SPEC QL NAA+PROBE: NEGATIVE
N GONORRHOEA DNA SPEC QL NAA+PROBE: NEGATIVE
SPECIMEN SOURCE: NORMAL
SPECIMEN SOURCE: NORMAL
T PALLIDUM AB SER QL: NONREACTIVE

## 2020-03-03 ASSESSMENT — ANXIETY QUESTIONNAIRES: GAD7 TOTAL SCORE: 12

## 2020-03-05 LAB
COPATH REPORT: NORMAL
PAP: NORMAL

## 2020-03-09 PROBLEM — R87.810 CERVICAL HIGH RISK HPV (HUMAN PAPILLOMAVIRUS) TEST POSITIVE: Status: ACTIVE | Noted: 2017-05-01

## 2020-03-14 DIAGNOSIS — Z13.220 SCREENING FOR LIPOID DISORDERS: ICD-10-CM

## 2020-03-14 DIAGNOSIS — Z13.1 SCREENING FOR DIABETES MELLITUS: ICD-10-CM

## 2020-03-14 LAB
CHOLEST SERPL-MCNC: 199 MG/DL
GLUCOSE SERPL-MCNC: 91 MG/DL (ref 70–99)
HDLC SERPL-MCNC: 50 MG/DL
LDLC SERPL CALC-MCNC: 134 MG/DL
NONHDLC SERPL-MCNC: 149 MG/DL
TRIGL SERPL-MCNC: 75 MG/DL

## 2020-03-14 PROCEDURE — 82947 ASSAY GLUCOSE BLOOD QUANT: CPT | Performed by: OBSTETRICS & GYNECOLOGY

## 2020-03-14 PROCEDURE — 80061 LIPID PANEL: CPT | Performed by: OBSTETRICS & GYNECOLOGY

## 2020-03-14 PROCEDURE — 36415 COLL VENOUS BLD VENIPUNCTURE: CPT | Performed by: OBSTETRICS & GYNECOLOGY

## 2020-03-16 ENCOUNTER — THERAPY VISIT (OUTPATIENT)
Dept: PHYSICAL THERAPY | Facility: CLINIC | Age: 45
End: 2020-03-16
Attending: OBSTETRICS & GYNECOLOGY
Payer: COMMERCIAL

## 2020-03-16 DIAGNOSIS — G89.29 CHRONIC PAIN OF RIGHT KNEE: ICD-10-CM

## 2020-03-16 DIAGNOSIS — M25.561 RIGHT KNEE PAIN: ICD-10-CM

## 2020-03-16 DIAGNOSIS — M25.561 CHRONIC PAIN OF RIGHT KNEE: ICD-10-CM

## 2020-03-16 PROCEDURE — 97161 PT EVAL LOW COMPLEX 20 MIN: CPT | Mod: GP | Performed by: PHYSICAL THERAPIST

## 2020-03-16 PROCEDURE — 97110 THERAPEUTIC EXERCISES: CPT | Mod: GP | Performed by: PHYSICAL THERAPIST

## 2020-03-16 ASSESSMENT — ACTIVITIES OF DAILY LIVING (ADL)
HOW_WOULD_YOU_RATE_THE_CURRENT_FUNCTION_OF_YOUR_KNEE_DURING_YOUR_USUAL_DAILY_ACTIVITIES_ON_A_SCALE_FROM_0_TO_100_WITH_100_BEING_YOUR_LEVEL_OF_KNEE_FUNCTION_PRIOR_TO_YOUR_INJURY_AND_0_BEING_THE_INABILITY_TO_PERFORM_ANY_OF_YOUR_USUAL_DAILY_ACTIVITIES?: 75
STIFFNESS: THE SYMPTOM AFFECTS MY ACTIVITY MODERATELY
SIT WITH YOUR KNEE BENT: ACTIVITY IS NOT DIFFICULT
WALK: ACTIVITY IS MINIMALLY DIFFICULT
STAND: ACTIVITY IS MINIMALLY DIFFICULT
LIMPING: THE SYMPTOM AFFECTS MY ACTIVITY SLIGHTLY
KNEEL ON THE FRONT OF YOUR KNEE: ACTIVITY IS VERY DIFFICULT
KNEE_ACTIVITY_OF_DAILY_LIVING_SCORE: 60
KNEE_ACTIVITY_OF_DAILY_LIVING_SUM: 42
RAW_SCORE: 42
PAIN: THE SYMPTOM AFFECTS MY ACTIVITY SLIGHTLY
HOW_WOULD_YOU_RATE_THE_OVERALL_FUNCTION_OF_YOUR_KNEE_DURING_YOUR_USUAL_DAILY_ACTIVITIES?: ABNORMAL
RISE FROM A CHAIR: ACTIVITY IS MINIMALLY DIFFICULT
GO DOWN STAIRS: ACTIVITY IS MINIMALLY DIFFICULT
AS_A_RESULT_OF_YOUR_KNEE_INJURY,_HOW_WOULD_YOU_RATE_YOUR_CURRENT_LEVEL_OF_DAILY_ACTIVITY?: ABNORMAL
GO UP STAIRS: ACTIVITY IS MINIMALLY DIFFICULT
SQUAT: ACTIVITY IS FAIRLY DIFFICULT
GIVING WAY, BUCKLING OR SHIFTING OF KNEE: THE SYMPTOM AFFECTS MY ACTIVITY SEVERELY
WEAKNESS: THE SYMPTOM AFFECTS MY ACTIVITY MODERATELY
SWELLING: THE SYMPTOM AFFECTS MY ACTIVITY SLIGHTLY

## 2020-08-19 ENCOUNTER — NURSE TRIAGE (OUTPATIENT)
Dept: NURSING | Facility: CLINIC | Age: 45
End: 2020-08-19

## 2020-08-19 ENCOUNTER — E-VISIT (OUTPATIENT)
Dept: FAMILY MEDICINE | Facility: CLINIC | Age: 45
End: 2020-08-19
Payer: COMMERCIAL

## 2020-08-19 DIAGNOSIS — R20.2 FACIAL TINGLING SENSATION: Primary | ICD-10-CM

## 2020-08-19 PROCEDURE — 99422 OL DIG E/M SVC 11-20 MIN: CPT | Performed by: FAMILY MEDICINE

## 2020-08-19 NOTE — TELEPHONE ENCOUNTER
Symptoms over last week    Starting yesterday lips are going a little numb   -the top middle of lips  -no swelling  One day where vision got blurry for a few min  -only once     Feels tingly    Has been under a lot of stress     No fever  No sore throat   No dental problems  No pain     Triaged to a disposition of See PCP within 2 weeks. Patient plans to go to  tomorrow.     COVID 19 Nurse Triage Plan/Patient Instructions    Please be aware that novel coronavirus (COVID-19) may be circulating in the community. If you develop symptoms such as fever, cough, or SOB or if you have concerns about the presence of another infection including coronavirus (COVID-19), please contact your health care provider or visit [email or web address].     Disposition/Instructions    Virtual Visit with provider recommended. Reference Visit Selection Guide.    Thank you for taking steps to prevent the spread of this virus.  o Limit your contact with others.  o Wear a simple mask to cover your cough.  o Wash your hands well and often.    Resources    [facility]: About COVID-19: [email or web address]    CDC: What to Do If You're Sick: www.cdc.gov/coronavirus/2019-ncov/about/steps-when-sick.html    CDC: Ending Home Isolation: www.cdc.gov/coronavirus/2019-ncov/hcp/disposition-in-home-patients.html     CDC: Caring for Someone: www.cdc.gov/coronavirus/2019-ncov/if-you-are-sick/care-for-someone.html     [facility]: Interim Guidance for Hospital Discharge to Home: www.health.[facility].mn.us/diseases/coronavirus/hcp/hospdischarge.pdf    [facility] clinical trials (COVID-19 research studies): [email or web address].[email or web address]/n-clinical-trials     Below are the COVID-19 hotlines at the [facility] of [facility] ([facility]). Interpreters are available.   o For health questions: Call 643-220-8901 or 1-376.331.7321 (7 a.m. to 7 p.m.)  o For questions about schools and childcare: Call 910-518-6076 or 1-481.145.3658 (7 a.m. to 7 p.m.)     Reason  for Disposition    All other mouth symptoms (Exceptions: dry mouth from not drinking enough liquids, chapped lips)    Additional Information    Negative: Severe difficulty breathing (e.g., struggling for each breath, speaks in single words, stridor)    Negative: Sounds like a life-threatening emergency to the triager    Negative: Injury to tooth or teeth    Negative: Injury to mouth    Negative: Canker sore suspected (i.e., aphthous ulcer) in the mouth    Negative: Cold sore suspected (i.e., fever blister sore) on the outer lip    Negative: Tooth is painful or swelling around a tooth    Negative: Mouth is painful    Negative: Throat is painful    Negative: Tongue swelling    Negative: Lip swelling    Negative: [1] Drooling or spitting out saliva (because can't swallow) AND [2] new onset    Negative: [1] Bleeding from mouth AND [2] won't stop after 10 minutes of direct pressure    Negative: Electrical burn of mouth    Negative: Chemical burn of mouth    Negative: [1] Difficulty breathing AND [2] not severe    Negative: Patient sounds very sick or weak to the triager    Negative: Receiving chemotherapy or radiation therapy    Negative: White patches that stick to tongue or inner cheek    Negative: [1] Dry mouth AND [2] new onset AND [3] unexplained(Exceptions: recurrent ongoing problem or dry mouth from mild dehydration)    Negative: Weak immune system (e.g., HIV positive, cancer chemo, splenectomy, organ transplant, chronic steroids)    Negative: Dentures rub and cause pain    Negative: Painless lump in mouth    Negative: Red patch in mouth    Negative: White patch in mouth    Negative: Dry mouth is a chronic symptom (recurrent or ongoing AND present > 4 weeks)    Negative: Bleeding from mouth is a chronic symptom (recurrent or ongoing AND present > 4 weeks)    Negative: Cracked skin at corners of mouth (i.e. where lips come together)    Negative: [1] Chapped lips AND [2] no improvement using CARE ADVICE    Protocols  used: MOUTH SYMPTOMS-A-AH    Paulette Aburto RN on 8/19/2020 at 4:49 AM

## 2020-08-20 RX ORDER — FAMCICLOVIR 500 MG/1
500 TABLET ORAL 3 TIMES DAILY
Qty: 21 TABLET | Refills: 0 | Status: SHIPPED | OUTPATIENT
Start: 2020-08-20 | End: 2020-09-21

## 2020-08-20 NOTE — TELEPHONE ENCOUNTER
Encounter Diagnosis   Name Primary?     Facial tingling sensation Yes    no current eye involvement  Has a couple of scabbing over papulae/? Vesicles onlip       Would be HSV or zoster starting  Start   Orders Placed This Encounter     famciclovir (FAMVIR) 500 MG tablet     Sig: Take 1 tablet (500 mg) by mouth 3 times daily for 7 days     Dispense:  21 tablet     Refill:  0     Follow up by phone in 3 days if not improving. Call ASAP if any eye involvement  Provider E-Visit time total (minutes): 12

## 2020-09-21 ENCOUNTER — E-VISIT (OUTPATIENT)
Dept: FAMILY MEDICINE | Facility: CLINIC | Age: 45
End: 2020-09-21
Payer: COMMERCIAL

## 2020-09-21 DIAGNOSIS — R20.2 FACIAL TINGLING SENSATION: ICD-10-CM

## 2020-09-21 PROCEDURE — 99421 OL DIG E/M SVC 5-10 MIN: CPT | Performed by: FAMILY MEDICINE

## 2020-09-21 RX ORDER — FAMCICLOVIR 500 MG/1
500 TABLET ORAL 3 TIMES DAILY
Qty: 21 TABLET | Refills: 0 | Status: SHIPPED | OUTPATIENT
Start: 2020-09-21 | End: 2021-07-09

## 2020-09-21 NOTE — TELEPHONE ENCOUNTER
Encounter Diagnosis   Name Primary?     Facial tingling sensation       Orders Placed This Encounter     famciclovir (FAMVIR) 500 MG tablet     Sig: Take 1 tablet (500 mg) by mouth 3 times daily     Dispense:  21 tablet     Refill:  0     Follow up only if unimproved. Provider E-Visit time total (minutes): 12

## 2020-09-30 ENCOUNTER — THERAPY VISIT (OUTPATIENT)
Dept: PHYSICAL THERAPY | Facility: CLINIC | Age: 45
End: 2020-09-30
Payer: COMMERCIAL

## 2020-09-30 DIAGNOSIS — M25.561 RIGHT KNEE PAIN: ICD-10-CM

## 2020-09-30 PROCEDURE — 97110 THERAPEUTIC EXERCISES: CPT | Mod: GP | Performed by: PHYSICAL THERAPIST

## 2020-09-30 PROCEDURE — 97164 PT RE-EVAL EST PLAN CARE: CPT | Mod: GP | Performed by: PHYSICAL THERAPIST

## 2020-09-30 NOTE — PROGRESS NOTES
"PROGRESS  REPORT    Progress reporting period is from 3/16/20 to 9/30/20.       SUBJECTIVE  Subjective changes noted by patient:  Patient didn't come in because of COVID.  Ready to start working with therapy again.  Patient reports the right knee continues to be a problem.  Almost always painful.  The right knee has given out a few times.  Left foot has been much better and wearing more supportive shoes.  Worse with getting active and better with rest.  Knee is getting slight work.        Current pain level is 4/10  .     Previous pain level was  4/10  .   Changes in function:  None (See Goal flowsheet attached for changes in current functional level)  Adverse reaction to treatment or activity: None    OBJECTIVE  Changes noted in objective findings:  Yes, Right knee quad 4/5, with pain flexion 4+/5, fair/ppor control 6\" step up on right.  Gluteus medius 4+/5, gluteus max 4/5, hip flexion right 4+/5 .        ASSESSMENT/PLAN  Updated problem list and treatment plan: Diagnosis 1:  Right knee pain  Pain -  hot/cold therapy, self management, education, directional preference exercise and home program  Decreased strength - therapeutic exercise, therapeutic activities and home program  Impaired muscle performance - neuro re-education and home program  Decreased function - therapeutic activities and home program  STG/LTGs have been met or progress has been made towards goals:  None  Assessment of Progress: The patient's condition is unchanged.  Self Management Plans:  Patient has been instructed in a home treatment program.  I have re-evaluated this patient and find that the nature, scope, duration and intensity of the therapy is appropriate for the medical condition of the patient.  Edisi continues to require the following intervention to meet STG and LTG's:  PT    Recommendations:  This patient would benefit from continued therapy.     Frequency:  1 X every other week, once daily  Duration:  for 8 weeks        Please refer " to the daily flowsheet for treatment today, total treatment time and time spent performing 1:1 timed codes.

## 2020-11-02 ENCOUNTER — TELEPHONE (OUTPATIENT)
Dept: FAMILY MEDICINE | Facility: CLINIC | Age: 45
End: 2020-11-02

## 2020-11-02 NOTE — TELEPHONE ENCOUNTER
Called patient and she states she is feeling uncomfortable pressure that worse with deep breathing in the middle of her chest since Friday.  No fever, no shortness or breath or difficulty breathing, no headache, no diarrhea and no nausea.  Also, patient denies dizziness, pain in the neck,shoulder or arms,not go anywhere else.    Patient states that she is coming for this appointment to make sure her heart, lungs and everything is okay. Patient has scheduled appointment with you tomorrow.    Patient advised that she should go to ER if symptoms get worse.    Routed to [place] Paris PA-C please review and advise    Shubham Olmstead RN

## 2020-11-15 ENCOUNTER — VIRTUAL VISIT (OUTPATIENT)
Dept: FAMILY MEDICINE | Facility: OTHER | Age: 45
End: 2020-11-15
Payer: COMMERCIAL

## 2020-11-15 PROCEDURE — 99421 OL DIG E/M SVC 5-10 MIN: CPT | Performed by: FAMILY MEDICINE

## 2020-11-15 NOTE — PROGRESS NOTES
"Date: 2020 23:38:26  Clinician: Va Monaco  Clinician NPI: [id number]  Patient: Deisi Miner  Patient : 1975  Patient Address: [address] [place] [address]  Patient Phone: [phone]  Visit Protocol: Oral mouth sores  Patient Summary:  Deisi is a 45 year old ( : 1975 ) female who initiated a [facility] Visit to prevent future outbreaks of cold sores but reports having active sores. When asked the question \"Please sign me up to receive news, health information and promotions from [facility].\", Deisi responded \"Yes\".   A synchronous visit is necessary because the patient reported the following abnormal symptoms:   Sores have been present for more than 2 weeks   Images of her skin condition were uploaded.   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   Symptom details  Her symptoms started more than 2 weeks ago. The symptoms consist of tingling and burning.   The sores are located on the lips on both sides of the face. The sores are located together, in a cluster. She has 1 to 2 sores.   Deisi denies having pain around the affected area. She also denies feeling feverish. Deisi is not experiencing similar rash or sores on other parts of the body.   Precipitating events  Deisi experienced pain or unusual sensations (such as itching, burning, or tingling) in the location of sores/rash before it appeared.   Pertinent medical history  Her last outbreak was 2 to 4 weeks ago. She has had 5 outbreak(s) in the past three months. Her sores are typically non-recurrent.   Deisi has taken Valacyclovir (Valtrex) pills to treat the sores in the past. The following medications were effective in treating the sores: Valacyclovir (Valtrex) pills. Deisi has not taken oral antivirals for long-term suppression of the oral sores in the past 12 months.   Deisi needs a return to work/school note.   Deisi does not smoke or use smokeless tobacco.   She denies pregnancy and denies " breastfeeding. She is currently menstruating.     MEDICATIONS: Zoloft oral, ALLERGIES: NKDA  Clinician Response:  Dear Deisi,  Based on the information provided, the lesions or sores you have are most likely cold sores, also known as fever blisters.  The technical term is 'herpes simplex virus type 1'. Common symptoms include a tingling sensation on the mouth or lips, fluid filled blisters, or crusting on the skin.  A person who has this condition can transmit the infection to others through direct contact. After you have had cold sores, the virus remains dormant in your skin cells and can come back at a later time causing another cold sore.   Medication information  I am prescribing:     Valacyclovir 1 gram oral tablet. Take 2 tablets by mouth every 12 hours for 1 day. If you think your oral sores are returning, refill the medication and use it according to the instructions. Your prescription includes 1 refill.   If you become pregnant during this course of treatment with medication, stop taking the medication and contact your primary care provider.   Self care  To reduce the spread of the sores to other people and to other parts of your body, please take the following precautions:      Try not to itch or scratch the sores    If the lesions are draining, keep them covered    Be sure to wash your hands with soap and water often and after touching the sores    Wash towels and bed linens in hot water and dry them on high heat    When you have active sores:    Avoid direct contact such as kissing    Do not share silverware, cups, or towels           When to seek care  Please make an appointment to be seen in a clinic or urgent care if any of the following occur:     Your sores do not heal within 2 weeks    You develop new symptoms or your symptoms become worse      Diagnosis: Herpes Simplex (Cold Sores)  Diagnosis ICD: B00.9  Triage Notes: I reviewed the patient's history, verified their identity, and explained the  [facility] Visit process.    Several outbreaks recently, works well to start valtrex right away. Will follow-up with PCP if this pattern of frequent coldsores continues.  Synchronous Triage: phone, status: completed, duration: 260 seconds  Prescription: valacyclovir (Valtrex) 1 gram oral tablet 4 tablet, 1 days supply. Take 2 tablets by mouth every 12 hours for 1 day. Refills: 1, Refill as needed: no, Allow substitutions: yes  Pharmacy: [id number] - [phone] - [phone] [place],  [place] [address]

## 2021-01-06 ENCOUNTER — VIRTUAL VISIT (OUTPATIENT)
Dept: PSYCHOLOGY | Facility: CLINIC | Age: 46
End: 2021-01-06
Payer: COMMERCIAL

## 2021-01-06 DIAGNOSIS — F43.23 ADJUSTMENT DISORDER WITH MIXED ANXIETY AND DEPRESSED MOOD: Primary | ICD-10-CM

## 2021-01-06 PROCEDURE — 90791 PSYCH DIAGNOSTIC EVALUATION: CPT | Mod: TEL

## 2021-01-06 NOTE — PROGRESS NOTES
"  Name:  Deisi Miner  Mrn: [id number]  Date of visit: 2021    PSYCHOLOGY OUTPATIENT VISIT NOTE- telephone     Telehealth visit appropriate.    Referral information: previous patient, see EMR.    PRESENTING PROBLEMS/SYMPTOMS:  Difficulty coping with work transitions, anxiety and sadness.  Last visit 18.   ( = Altagracia; Daughter = Moira, son = Altaf (twin)-  14)      Social History and Context  Nuclear family - Moira 5yo, school hybrid model.  , sold house and moved to [place], [employer], still unpacking \"a bucket of stress\", renos.   working from home since 2020.  Want to re-establish family meetings, using white board for chores.  Marriage sad, not what she would like it to be.  No me-time, Moira has dance and H has his stuff, me?   Family of origin - was very involved in [employer], scandal at [employer] and realized it was a \"scam-cult.\"  Now mom reaching out to connect.  Uncertain how to do this in healthy way.     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   for 20yrs leaving, \"another abandoning me.\"  The hurt that I felt, she liked the former principal better.  Recognizing thoughts not logical.  Found a new candidate.    Social support - sparse with pandemic  Self Care - getting manicures, no other self care.  Little me time and when alone uncertain what to do, end up \"doing nothing.\"      Psychological Symptoms  Sad - intermittent  Increase or Decrease in Appetite/Weight (episodes of dysregulation, missing meals, less healthy fast choices)  Increase or Decrease in Sleep (dysregulation - middle insomnia; staying in bed up to 45min past alarm, don't want to get up)  Decreased Energy  Irritable  Self esteem - issues of confidence at work  Anxiety - yes    ASSESSMENT:  Expressing readiness to re-initiate treatment.  Multiple stressors.  Mental Status Assessment:  Appearance:   unknown  Eye Contact:   n/a  Psychomotor " Behavior: unknown  Attitude:   Cooperative   Orientation:   All  Speech   Rate / Production: Normal    Volume:  Normal   Mood:    Anxious  Dysphoric  Thought Content:  Clear   Thought Form:  Coherent  Logical   Insight:    Good   Strengths: education , employment, housing,   DIAGNOSIS:    Axis I:  Adjustment disorder with mixed anxiety and depressed mood (r/o MDD recurrent; r/o anxiety disorder)  Axis II:  deferred  Axis III:  obesity  Axis IV:  Social support  Axis V:  GAF current = 55-60    Summary and Impressions:  Deisi Miner is 44yo,  and  to a male partner.  She is known to this provider as she engaged in psychotherapy for over 6yrs (last visit 2/20/18) in the context of fertility problems, anxiety and depressed mood.  The couple engaged in ART and had twins who had a NICU stay with Altaf dying in early infancy (11/15/14).  Ms Miner is currently experiencing stressors including changes in connection with her family of origin, first year as principal and in a pandemic, child hybrid learning, recent move to a new home.  She is engaging in little self care or social connection.  Psychotherapy is recommended and she initiated this visit with that goal, is in agreement with recommendation.    PLAN:    Patient to schedule followup visit.       Total time spent equals 55 minutes, psychological assessment.  Telephone  Start: 10:00-55

## 2021-02-08 ENCOUNTER — VIRTUAL VISIT (OUTPATIENT)
Dept: PSYCHOLOGY | Facility: CLINIC | Age: 46
End: 2021-02-08
Attending: PATHOLOGY
Payer: COMMERCIAL

## 2021-02-08 DIAGNOSIS — F32.A DEPRESSION, UNSPECIFIED DEPRESSION TYPE: ICD-10-CM

## 2021-02-08 DIAGNOSIS — F41.9 ANXIETY DISORDER, UNSPECIFIED TYPE: Primary | ICD-10-CM

## 2021-02-08 PROCEDURE — 90834 PSYTX W PT 45 MINUTES: CPT | Mod: TEL

## 2021-02-08 NOTE — PROGRESS NOTES
"  Name:  Deisi Miner  Mrn: [id number]  Date of visit: 2021    PSYCHOLOGY OUTPATIENT VISIT NOTE     The author of this note documented a reason for not sharing it with the patient.    The patient has been notified of the following:      \"We have found that certain health care needs can be provided without the need for a face to face visit.  This service lets us provide the care you need with a phone conversation.       I will have full access to your [place] medical record during this entire phone call.   I will be taking notes for your medical record.      Since this is like an office visit, we will bill your insurance company for this service.        Confidentiality still applies for telephone services, and nobody will record the visit.  It is important to be in a quiet, private space that is free of distractions during the visit.??      Consent has been obtained for this service by care team member: Yes       PRESENTING PROBLEMS/SYMPTOMS:  Sad. Disturbance in appetite and sleep. Energy lower.  Anxiety with irritability, fidgetiness, insomnia (middle), fatigue.  Relational difficulties.   ( = Altagracia; Daughter = Moira, son = Altaf (twin)-  14)    INTERVENTION AND RESPONSE:  Cognitive Behavioral therapy, individual.  This is patient's first psychotherapy visit following the initial psychological assessment.  Patient presented walking to her office at work, explained that she will prefer to have provider her call her 15min later for these visits.  Agreed.  She reported coping with work stressors.  More uncertain with managing stressors in marital relaitonship: resentment towards H, his seeming disinterest in parenting, chores etc. \"I might as well be a single mom.\"  No physical intimacy for a couple of yrs, feeding into her sense of \"being broken.\"  His \"rude comments\" and jokes.  Discussed and supported.  Importance of considering this info in developing tx goals. Worked on tx plan, to " complete.    ASSESSMENT:  Laughing when describing stressors.  Uncertain re marital relationship continuing but expressed love for H.  Further review of sxs (see Behavioral Tx Plan, EMR), suggests that she is experiencing symptoms consistent with an anxiety disorder and depressive disorder which has been more chronic than adjustments to stressors in the past year.  She stated a plan to consult with prescribing provider re dosage of psychiatric meds.    Mental Status Assessment:  Appearance:   unknown  Eye Contact:   n/a  Psychomotor Behavior: unknown  Attitude:   Cooperative   Orientation:   All  Speech   Rate / Production: Normal    Volume:  Normal   Mood:    Anxious  Sad   Thought Content:  Clear   Thought Form:  Coherent  Logical   Insight:    Fair     DIAGNOSIS:  Unspecified Anxiety disorder, Unspecified depressive disorder.      PLAN:    Patient to schedule followup visit.       Total time spent equals 45 minutes, individual psychotherapy.  Telephone  3:15-4:00

## 2021-03-02 PROBLEM — M25.561 RIGHT KNEE PAIN: Status: RESOLVED | Noted: 2020-03-16 | Resolved: 2021-03-02

## 2021-03-08 ENCOUNTER — TELEPHONE (OUTPATIENT)
Dept: PSYCHOLOGY | Facility: CLINIC | Age: 46
End: 2021-03-08

## 2021-03-08 NOTE — TELEPHONE ENCOUNTER
Called pt 3x for today's visit.  No answer, left v/ms.  Encouraged pt to reschedule or contact provider via [facility] with any questions/concerns.

## 2021-03-27 ENCOUNTER — HEALTH MAINTENANCE LETTER (OUTPATIENT)
Age: 46
End: 2021-03-27

## 2021-05-16 ENCOUNTER — HEALTH MAINTENANCE LETTER (OUTPATIENT)
Age: 46
End: 2021-05-16

## 2021-07-09 ENCOUNTER — OFFICE VISIT (OUTPATIENT)
Dept: OBGYN | Facility: CLINIC | Age: 46
End: 2021-07-09
Payer: COMMERCIAL

## 2021-07-09 VITALS
SYSTOLIC BLOOD PRESSURE: 114 MMHG | HEART RATE: 81 BPM | OXYGEN SATURATION: 99 % | HEIGHT: 62 IN | WEIGHT: 247.3 LBS | DIASTOLIC BLOOD PRESSURE: 85 MMHG | BODY MASS INDEX: 45.51 KG/M2

## 2021-07-09 DIAGNOSIS — Z13.220 SCREENING FOR LIPID DISORDERS: ICD-10-CM

## 2021-07-09 DIAGNOSIS — Z01.419 ENCOUNTER FOR GYNECOLOGICAL EXAMINATION WITHOUT ABNORMAL FINDING: Primary | ICD-10-CM

## 2021-07-09 DIAGNOSIS — F41.1 GAD (GENERALIZED ANXIETY DISORDER): ICD-10-CM

## 2021-07-09 DIAGNOSIS — Z13.1 SCREENING FOR DIABETES MELLITUS: ICD-10-CM

## 2021-07-09 DIAGNOSIS — E66.01 MORBID OBESITY (H): ICD-10-CM

## 2021-07-09 LAB — HBA1C MFR BLD: 5.8 % (ref 0–5.6)

## 2021-07-09 PROCEDURE — 80061 LIPID PANEL: CPT | Performed by: OBSTETRICS & GYNECOLOGY

## 2021-07-09 PROCEDURE — 83036 HEMOGLOBIN GLYCOSYLATED A1C: CPT | Performed by: OBSTETRICS & GYNECOLOGY

## 2021-07-09 PROCEDURE — 99396 PREV VISIT EST AGE 40-64: CPT | Performed by: OBSTETRICS & GYNECOLOGY

## 2021-07-09 PROCEDURE — 36415 COLL VENOUS BLD VENIPUNCTURE: CPT | Performed by: OBSTETRICS & GYNECOLOGY

## 2021-07-09 PROCEDURE — 82947 ASSAY GLUCOSE BLOOD QUANT: CPT | Performed by: OBSTETRICS & GYNECOLOGY

## 2021-07-09 RX ORDER — BUPROPION HYDROCHLORIDE 150 MG/1
150 TABLET ORAL EVERY MORNING
Qty: 90 TABLET | Refills: 3 | Status: SHIPPED | OUTPATIENT
Start: 2021-07-09 | End: 2021-10-12

## 2021-07-09 ASSESSMENT — ANXIETY QUESTIONNAIRES
GAD7 TOTAL SCORE: 13
6. BECOMING EASILY ANNOYED OR IRRITABLE: MORE THAN HALF THE DAYS
7. FEELING AFRAID AS IF SOMETHING AWFUL MIGHT HAPPEN: NOT AT ALL
2. NOT BEING ABLE TO STOP OR CONTROL WORRYING: MORE THAN HALF THE DAYS
5. BEING SO RESTLESS THAT IT IS HARD TO SIT STILL: SEVERAL DAYS
3. WORRYING TOO MUCH ABOUT DIFFERENT THINGS: MORE THAN HALF THE DAYS
1. FEELING NERVOUS, ANXIOUS, OR ON EDGE: NEARLY EVERY DAY

## 2021-07-09 ASSESSMENT — PATIENT HEALTH QUESTIONNAIRE - PHQ9
SUM OF ALL RESPONSES TO PHQ QUESTIONS 1-9: 6
5. POOR APPETITE OR OVEREATING: NEARLY EVERY DAY

## 2021-07-09 ASSESSMENT — MIFFLIN-ST. JEOR: SCORE: 1720

## 2021-07-09 NOTE — PATIENT INSTRUCTIONS
Plan evisit in about 6 weeks (before school starts and st fair) to recheck anxiety score.     Make lab only appointment fasting (nothing but water 10 hours prior to blood draw) at any [facility].

## 2021-07-09 NOTE — PROGRESS NOTES
"Deisi is a 46 year old  female who presents for annual exam.     Menses are irregular   No LMP recorded. (Menstrual status: IUD).. Using IUD for contraception.  She is not currently considering pregnancy.  Besides routine health maintenance,  she would like to discuss iud removal.  Has Mirena IUD in place and feels like she might be going through perimenopausal symptoms, wonders if it should be removed so that she can \"see what is really happening\"?    Daughter will 7 this fall and going into first grade.  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  Ran out of her Zoloft prescription so just stopped taking it.  Today her DENISE=13 and PHQ=6  She and her spouse are no longer sexually active and not sure what the future holds.  Has gained another 5 pounds since last years visit.  GYNECOLOGIC HISTORY:  Menarche:  Deisi is not sexually active  History sexually transmitted infections:No STD history  STI testing offered?  Declined  BOB exposure: Unknown  History of abnormal Pap smear: NO - age 30-65 PAP every 5 years with negative HPV co-testing recommended  Family history of breast CA: No  Family history of uterine/ovarian CA: No    Family history of colon CA: No    HEALTH MAINTENANCE:  Cholesterol: (  Cholesterol   Date Value Ref Range Status   2020 199 <200 mg/dL Final   2015 181 <200 mg/dL Final     Comment:     LDL Cholesterol is the primary guide to therapy.   The NCEP recommends further evaluation of: patients with cholesterol greater   than 200 mg/dL if additional risk factors are present, cholesterol greater   than   240 mg/dL, triglycerides greater than 150 mg/dL, or HDL less than 40 mg/dL.      History of abnormal lipids: No  Mammo: 2019 . History of abnormal Mammo: No.  Regular Self Breast Exams: Yes  Calcium/Vitamin D intake: source:  dairy Adequate? Yes  TSH: (  TSH   Date Value Ref Range Status   2020 2.51 0.40 - 4.00 mU/L Final    )  Pap; (  Lab Results   Component Value Date    PAP " NIL 2020    PAP NIL 2018    PAP NIL 2017    )    HISTORY:  OB History    Para Term  AB Living   1 1 0 1 0 1   SAB TAB Ectopic Multiple Live Births   0 0 0 1 2      # Outcome Date GA Lbr Artur/2nd Weight Sex Delivery Anes PTL Lv   1A  10/18/14 34w1d  1.25 kg (2 lb 12.1 oz) M CS-LTranv Spinal  ND      Name: Altaf      Apgar1: 6  Apgar5: 7   1B  10/18/14 34w1d  1.3 kg (2 lb 13.9 oz) F CS-LTranv Spinal  KARI      Name: Sera      Apgar1: 8  Apgar5: 9     Past Medical History:   Diagnosis Date     Arthritis      Cancer (H)      Cervical high risk HPV (human papillomavirus) test positive 2017: NIL pap, + HR HPV (not 16 or 18) result.      Depression     anxiety, sees therapist     Diabetes (H)      Hypertension      Obesity      Past Surgical History:   Procedure Laterality Date     C IUD,MIRENA  2017      SECTION N/A 10/18/2014    Procedure:  SECTION;  Surgeon: Edilma Zayas MD;  Location: [facility] L[facility]     COLONOSCOPY       ENDOMETRIAL SAMPLING (BIOPSY) N/A 2017    Procedure: ENDOMETRIAL SAMPLING (BIOPSY);  Endometrial Biopsy with Mirena Intrauterine Device Placement;  Surgeon: Samantha Sultana MD;  Location: [facility] OR     GYN SURGERY  2013    Fallopian Tubes Removed     HC AMNIOCENTESIS DIAGNOSTIC  2014     HC AMNIOCENTESIS DIAGNOSTIC  2014     hsg  11    blockage of both tubes     INSERT INTRAUTERINE DEVICE N/A 2017    Procedure: INSERT INTRAUTERINE DEVICE;;  Surgeon: Samantha Sultana MD;  [facility]: [facility]     LAPAROSCOPIC GASTRIC SLEEVE  2012    Procedure: LAPAROSCOPIC GASTRIC SLEEVE;  Laparoscopic Sleeve Gastrectomy and Hiatal Hernia Repair;  Surgeon: Eze Muhammad MD;  [facility]     LAPAROSCOPIC SALPINGECTOMY  2013    Procedure: LAPAROSCOPIC SALPINGECTOMY;  Operative Laparoscopy, lysis of adhesions, Bilateral Salpingectomies ;  Surgeon: Abdelrahman Corcoran MD;  Location: [facility] OR      U/S GUIDANCE FOR TVOR (CLINIC ONLY)  10/2013     Family History   Problem Relation Age of Onset     Diabetes Maternal Grandmother      Hypertension Maternal Grandmother      Mental Illness Maternal Grandmother      Cerebrovascular Disease Maternal Grandmother      Allergies Father      Hypertension Father      Prostate Cancer Father      Mental Illness Father      Obesity Father      Anxiety Disorder Mother      Mental Illness Mother      Respiratory Brother      Obesity Brother      Social History     Socioeconomic History     Marital status:      Spouse name: Not on file     Number of children: 1     Years of education: 18     Highest education level: Not on file   Occupational History     Occupation: professional development     Employer: [employer]     Comment: middle school   Social Needs     Financial resource strain: Not on file     Food insecurity     Worry: Not on file     Inability: Not on file     Transportation needs     Medical: Not on file     Non-medical: Not on file   Tobacco Use     Smoking status: Never Smoker     Smokeless tobacco: Never Used   Substance and Sexual Activity     Alcohol use: No     Frequency: Never     Comment: rarely     Drug use: No     Sexual activity: Yes     Partners: Male     Birth control/protection: I.U.D.     Comment: no fallopian tubes   Lifestyle     Physical activity     Days per week: Not on file     Minutes per session: Not on file     Stress: Not on file   Relationships     Social connections     Talks on phone: Not on file     Gets together: Not on file     Attends [employer] service: Not on file     Active member of club or organization: Not on file     Attends meetings of clubs or organizations: Not on file     Relationship status: Not on file     Intimate partner violence     Fear of current or ex partner: Not on file     Emotionally abused: Not on file     Physically abused: Not on file     Forced sexual activity: Not on file   Other  "Topics Concern     Parent/sibling w/ CABG, MI or angioplasty before 65F 55M? No   Social History Narrative     Not on file     No current outpatient medications on file.     Allergies   Allergen Reactions     No Known Drug Allergies        Past medical, surgical, social and family history were reviewed and [facility] in [facility].      EXAM:  /85   Pulse 81   Ht 1.575 m (5' 2\")   Wt 112.2 kg (247 lb 4.8 oz)   SpO2 99%   BMI 45.23 kg/m     BMI: Body mass index is 45.23 kg/m .  Constitutional: healthy, alert and no distress  Head: Normocephalic. No masses, lesions, tenderness or abnormalities  Neck: Neck supple. Trachea midline. No adenopathy. Thyroid symmetric, normal size.   Cardiovascular: RRR.   Respiratory: Negative.   Breast: Breasts reveal mild symmetric fibrocystic densities, but there are no dominant, discrete, fixed or suspicious masses found.  Gastrointestinal: Abdomen soft, non-tender, non-distended. No masses, organomegaly.  :  Vulva:  No external lesions, normal female hair distribution, no inguinal adenopathy.    Urethra:  Midline, non-tender, well supported, no discharge  Vagina:  Moist, pink, no abnormal discharge, no lesions  Uterus:  Normal size, IUD strings felt on exam, non-tender, freely mobile  Ovaries:  No masses appreciated, non-tender, mobile  Rectal Exam: deferred  Musculoskeletal: extremities normal  Skin: no suspicious lesions or rashes  Psychiatric: Affect appropriate, cooperative,mentation appears normal.     COUNSELING:   Reviewed preventive health counseling, as reflected in patient instructions       ([place])menopause management   reports that she has never smoked. She has never used smokeless tobacco.    Body mass index is 45.23 kg/m .  Weight management plan: Patient referred to endocrine and/or weight management specialty  FRAX Risk Assessment    ASSESSMENT:  46 year old female with satisfactory annual exam  (Z01.419) Encounter for gynecological examination without abnormal " finding  (primary encounter diagnosis)  Comment: Mirena 2017  Plan: Discussed perimenopause and that likely she would be right back here to get a new Mirena if we took it out since this was placed for cycle management.  Suggested next year we do a removal replacement with the Mirena IUD and questions were answered.    (F41.1) DENSIE (generalized anxiety disorder)  Comment: Worse  Plan: buPROPion (WELLBUTRIN XL) 150 MG 24 hr tablet        As she was not fully relieved of her anxiety symptoms on Zoloft, will start Wellbutrin today and plan for E visit in 6 to 8 weeks to recheck anxiety.  Side effects were discussed.    (E66.01) Morbid obesity (H)  Comment: BMI 45  Plan: COMPREHENSIVE WEIGHT MANAGEMENT        Patient has had prior gastric sleeve and therefore will go to weight management center to see about surgery versus medication management.    (Z13.220) Screening for lipid disorders  Comment: Fasting  Plan: Lipid Profile        Check lab today as this was elevated last year    (Z13.1) Screening for diabetes mellitus  Comment: Borderline diabetes  Plan: Glucose, Hemoglobin A1c        Fasting and check lab today.      Samantha Sultana MD

## 2021-07-10 ENCOUNTER — APPOINTMENT (OUTPATIENT)
Dept: URGENT CARE | Facility: CLINIC | Age: 46
End: 2021-07-10
Payer: COMMERCIAL

## 2021-07-10 LAB
CHOLEST SERPL-MCNC: 189 MG/DL
GLUCOSE SERPL-MCNC: 77 MG/DL (ref 70–99)
HDLC SERPL-MCNC: 48 MG/DL
LDLC SERPL CALC-MCNC: 119 MG/DL
NONHDLC SERPL-MCNC: 141 MG/DL
TRIGL SERPL-MCNC: 110 MG/DL

## 2021-07-10 ASSESSMENT — ANXIETY QUESTIONNAIRES: GAD7 TOTAL SCORE: 13

## 2021-07-13 NOTE — TELEPHONE ENCOUNTER
REFERRAL INFORMATION:    Referring Provider:  Dr. Samantha Sultana     Referring Clinic:  [facility] [place]     Reason for Visit/Diagnosis: New Re-Establish WLS, BMI 45.2, 5/10/12 Gastric Sleeve       FUTURE VISIT INFORMATION:    Appointment Date: 8/12/2021    Appointment Time: 7:30 AM      NOTES RECORD STATUS  DETAILS   OFFICE NOTE from Referring Provider Internal 7/9/2021 Office visit with Dr. Sultana      OFFICE NOTE from Other Specialists Internal 5/22/17, 4/24/17 Office visit with Teri Ramirez PA-C ([facility])     2/10/16, 11/24/15 Office visit with Dr. Lam Maher (Weight Mgmt Adult)     6/16/14, 7/1/13, 10/19/12 Office visit with [place] Carr CNP (Surgery Clinic) - more office visits in [facility]    2/13/13, 5/10/12 Office visit with Dr. Eze Muhammad (Surgery Clinic)    ** More office visits in [facility]       HOSPITAL DISCHARGE SUMMARY/ ED VISITS  N/A    OPERATIVE REPORT Internal Laparoscopic Sleeve Gastrectomy and Hiatal Hernia Repair: 7/16/12   ENDOSCOPY (EGD)  N/A    PERTINENT LABS Internal    PATHOLOGY REPORTS (RELATED) N/A    IMAGING (CT, MRI, US, XR)  N/A

## 2021-08-11 ENCOUNTER — TELEPHONE (OUTPATIENT)
Dept: ENDOCRINOLOGY | Facility: CLINIC | Age: 46
End: 2021-08-11

## 2021-08-11 NOTE — TELEPHONE ENCOUNTER
Patient had LSG weight loss surgery 2012 at [facility].      Scheduled to see CNP or JT at 0730, followed by an appt with a dietitian at 0830.    Spoke to patient.      Instructed to complete pre-visit questionnaires on [facility], or arrive 15 minutes early to complete.    [phone] contact center phone number.    Judy Salazar, EMT

## 2021-08-12 ENCOUNTER — VIRTUAL VISIT (OUTPATIENT)
Dept: ENDOCRINOLOGY | Facility: CLINIC | Age: 46
End: 2021-08-12
Payer: COMMERCIAL

## 2021-08-12 ENCOUNTER — TELEPHONE (OUTPATIENT)
Dept: ENDOCRINOLOGY | Facility: CLINIC | Age: 46
End: 2021-08-12

## 2021-08-12 ENCOUNTER — PRE VISIT (OUTPATIENT)
Dept: ENDOCRINOLOGY | Facility: CLINIC | Age: 46
End: 2021-08-12

## 2021-08-12 ENCOUNTER — E-VISIT (OUTPATIENT)
Dept: OBGYN | Facility: CLINIC | Age: 46
End: 2021-08-12

## 2021-08-12 VITALS — WEIGHT: 242 LBS | HEIGHT: 62 IN | BODY MASS INDEX: 44.53 KG/M2

## 2021-08-12 DIAGNOSIS — Z98.84 S/P LAPAROSCOPIC SLEEVE GASTRECTOMY: ICD-10-CM

## 2021-08-12 DIAGNOSIS — E66.01 OBESITY, CLASS III, BMI 40-49.9 (MORBID OBESITY) (H): ICD-10-CM

## 2021-08-12 DIAGNOSIS — E66.01 MORBID OBESITY (H): ICD-10-CM

## 2021-08-12 DIAGNOSIS — F41.1 GENERALIZED ANXIETY DISORDER: Primary | ICD-10-CM

## 2021-08-12 DIAGNOSIS — R73.03 PREDIABETES: ICD-10-CM

## 2021-08-12 DIAGNOSIS — E66.01 OBESITY, CLASS III, BMI 40-49.9 (MORBID OBESITY) (H): Primary | ICD-10-CM

## 2021-08-12 DIAGNOSIS — Z71.3 NUTRITIONAL COUNSELING: Primary | ICD-10-CM

## 2021-08-12 PROCEDURE — 99421 OL DIG E/M SVC 5-10 MIN: CPT | Performed by: OBSTETRICS & GYNECOLOGY

## 2021-08-12 PROCEDURE — 97802 MEDICAL NUTRITION INDIV IN: CPT | Mod: GT | Performed by: DIETITIAN, REGISTERED

## 2021-08-12 PROCEDURE — 99205 OFFICE O/P NEW HI 60 MIN: CPT | Mod: 95 | Performed by: NURSE PRACTITIONER

## 2021-08-12 RX ORDER — SEMAGLUTIDE 0.25 MG/.5ML
0.25 INJECTION, SOLUTION SUBCUTANEOUS
Qty: 2 ML | Refills: 0 | Status: SHIPPED | OUTPATIENT
Start: 2021-08-12 | End: 2021-08-17

## 2021-08-12 RX ORDER — SEMAGLUTIDE 0.5 MG/.5ML
0.5 INJECTION, SOLUTION SUBCUTANEOUS
Qty: 2 ML | Refills: 0 | Status: SHIPPED | OUTPATIENT
Start: 2021-08-12 | End: 2021-08-17

## 2021-08-12 ASSESSMENT — PAIN SCALES - GENERAL: PAINLEVEL: NO PAIN (0)

## 2021-08-12 ASSESSMENT — ANXIETY QUESTIONNAIRES
3. WORRYING TOO MUCH ABOUT DIFFERENT THINGS: MORE THAN HALF THE DAYS
GAD7 TOTAL SCORE: 12
8. IF YOU CHECKED OFF ANY PROBLEMS, HOW DIFFICULT HAVE THESE MADE IT FOR YOU TO DO YOUR WORK, TAKE CARE OF THINGS AT HOME, OR GET ALONG WITH OTHER PEOPLE?: SOMEWHAT DIFFICULT
2. NOT BEING ABLE TO STOP OR CONTROL WORRYING: MORE THAN HALF THE DAYS
GAD7 TOTAL SCORE: 12
1. FEELING NERVOUS, ANXIOUS, OR ON EDGE: NEARLY EVERY DAY
5. BEING SO RESTLESS THAT IT IS HARD TO SIT STILL: SEVERAL DAYS
4. TROUBLE RELAXING: NEARLY EVERY DAY
6. BECOMING EASILY ANNOYED OR IRRITABLE: SEVERAL DAYS
7. FEELING AFRAID AS IF SOMETHING AWFUL MIGHT HAPPEN: NOT AT ALL
GAD7 TOTAL SCORE: 12
7. FEELING AFRAID AS IF SOMETHING AWFUL MIGHT HAPPEN: NOT AT ALL

## 2021-08-12 ASSESSMENT — MIFFLIN-ST. JEOR: SCORE: 1695.95

## 2021-08-12 NOTE — PROGRESS NOTES
"New Re-establish Bariatric Surgery Consultation Note    2021    RE: Deisi Miner  MR#: [id number]  : 1975      Referring provider: No flowsheet data found.    Chief Complaint/Reason for visit: re-establish bariatric care    Dear Alejandro Gifford MD (General),    I had the pleasure of seeing your patient, Deisi Miner, to re-establish bariatric care. As you know, Deisi Miner is 46 year old.  She has a height of 5' 2\", a weight of 242 lbs 0 oz, and calculated Body mass index is 44.26 kg/m .. She has a history of Sleeve Gastrectomy in  at [facility] with Dr. Muhammad.      Assessment & Plan   Problem List Items Addressed This Visit        Digestive    Obesity, Class III, BMI 40-49.9 (morbid obesity) (H) - Primary     9 years s/p sleeve gastrectomy. Pregnancy 2 years post op with Post bariatric surgery weight gain. Mostly weight stable over the last 3-5 years but struggling to lose weight. Portions have increased over time but sill can't eat like she used to. Has added some liquid calories. Does note some emotional eating. Now, daughter is starting first grade and feels she needs to prioritize herself again. New diagnosis of prediabetes last month.     Will start trial of Wegovy- ozempic if no AOM coverage. No hx pancreatitis.     Dietitian visit today.          Relevant Medications    Semaglutide-Weight Management (WEGOVY) 0.25 MG/0.5ML SOAJ    Semaglutide-Weight Management (WEGOVY) 0.5 MG/0.5ML SOAJ    Other Relevant Orders    Vitamin A    Vitamin B12    Vitamin D Deficiency    Parathyroid Hormone Intact    CBC with platelets    Comprehensive metabolic panel    Med Therapy Management Referral       Endocrine    Prediabetes    Relevant Medications    Semaglutide-Weight Management (WEGOVY) 0.25 MG/0.5ML SOAJ    Semaglutide-Weight Management (WEGOVY) 0.5 MG/0.5ML SOAJ    Other Relevant Orders    Vitamin A    Vitamin B12    Vitamin D Deficiency    Parathyroid Hormone " Intact    CBC with platelets    Comprehensive metabolic panel    Med Therapy Management Referral       Other    S/P laparoscopic sleeve gastrectomy     9years post op. Not taking vitamins currently. No recent bariatric labs. Labs ordered.     No reflux. No constipation. No abdominal pain.   Continued restriction but portions have grown over time. Not  meals from liquids. Does have some carbonation. Does drink some caloric beverages.          Relevant Medications    Semaglutide-Weight Management (WEGOVY) 0.25 MG/0.5ML SOAJ    Semaglutide-Weight Management (WEGOVY) 0.5 MG/0.5ML SOAJ    Other Relevant Orders    Vitamin A    Vitamin B12    Vitamin D Deficiency    Parathyroid Hormone Intact    CBC with platelets    Comprehensive metabolic panel    Med Therapy Management Referral           70 minutes spent on the date of the encounter doing chart review, history and exam, documentation and further activities per the note    Deisi Miner is a 46 year old female who presents to clinic today for the following health issues         HISTORY OF PRESENT ILLNESS:  Weight Loss History Reviewed with Patient 2021   How long have you been overweight? Since early childhood   What is the most that you have ever weighed? 260   What is the most weight you have lost? 70   I have tried the following methods to lose weight Watching portions or calories, Exercise, Weight Watchers, Weight Loss Surgery   I have tried the following weight loss medications? (Check all that apply) None   Have you ever had weight loss surgery? Yes   Please select the type of weight loss surgery you had (select all that apply): sleeve gastrectomy     Sleeve and HH repair 2021 with Dr. DE LA ROSA   Consult 245lb   Day of surgery 228 (BMI 41)   1yr po 158lb (BMI 28.9)    Pregnancy - twins, one of the babies - daughter is now starting first grader    Last seen in our clinic 2017 with Teri Ramirez 259lb at that time     No reflux unless  specific food triggers. Still gets uncomfortable with rice/ bread   Constipation- tries to increase water, not problematic     Phentermine- felt hungry evening/ nighttime   topiramate- bloating, tingling, dry mouth     CO-MORBIDITIES OF OBESITY INCLUDE:     2021   I have the following health issues associated with obesity: Pre-Diabetes     New Pre-diabetes A1C 5.8 2021     PAST MEDICAL HISTORY:  Past Medical History:   Diagnosis Date     Arthritis      Cancer (H)      Cervical high risk HPV (human papillomavirus) test positive 2017: NIL pap, + HR HPV (not 16 or 18) result.      Depression     anxiety, sees therapist     Diabetes (H)      Hypertension      Obesity        PAST SURGICAL HISTORY:  Past Surgical History:   Procedure Laterality Date     C IUD,MIRENA  2017      SECTION N/A 10/18/2014    Procedure:  SECTION;  Surgeon: Edilma Zayas MD;  Location: [facility] L[facility]D     COLONOSCOPY       ENDOMETRIAL SAMPLING (BIOPSY) N/A 2017    Procedure: ENDOMETRIAL SAMPLING (BIOPSY);  Endometrial Biopsy with Mirena Intrauterine Device Placement;  Surgeon: Samantha Sultana MD;  Location: [facility] OR     GYN SURGERY  2013    Fallopian Tubes Removed     HC AMNIOCENTESIS DIAGNOSTIC  2014     HC AMNIOCENTESIS DIAGNOSTIC  2014     hsg  11    blockage of both tubes     INSERT INTRAUTERINE DEVICE N/A 2017    Procedure: INSERT INTRAUTERINE DEVICE;;  Surgeon: Samatnha Sultana MD;  Location: [facility]     LAPAROSCOPIC GASTRIC SLEEVE  2012    Procedure: LAPAROSCOPIC GASTRIC SLEEVE;  Laparoscopic Sleeve Gastrectomy and Hiatal Hernia Repair;  Surgeon: Eze Muhammad MD;  [facility]     LAPAROSCOPIC SALPINGECTOMY  2013    Procedure: LAPAROSCOPIC SALPINGECTOMY;  Operative Laparoscopy, lysis of adhesions, Bilateral Salpingectomies ;  Surgeon: Abdelrahman Corcoran MD;  Location: [facility] OR     U/S GUIDANCE FOR TVOR (CLINIC ONLY)  10/2013       FAMILY  HISTORY:   Family History   Problem Relation Age of Onset     Diabetes Maternal Grandmother      Hypertension Maternal Grandmother      Mental Illness Maternal Grandmother      Cerebrovascular Disease Maternal Grandmother      Allergies Father      Hypertension Father      Prostate Cancer Father      Mental Illness Father      Obesity Father      Anxiety Disorder Mother      Mental Illness Mother      Respiratory Brother      Obesity Brother        SOCIAL HISTORY:   Social History Questions Reviewed With Patient 8/11/2021   Which best describes your employment status (select all that apply) I work full-time   If you work, what is your occupation?    Which best describes your marital status:    Do you have children? Yes   Who do you have in your support network that can be available to help you for the first 2 weeks after surgery? Yes   Who can you count on for support throughout your weight loss surgery journey? Family and friends   Can you afford 3 meals a day?  Yes   Can you afford 50-60 dollars a month for vitamins? 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 Monthly or less   If you do drink alcohol, how many drinks might you have in a day? (one drink = 5 oz. wine, 1 can/bottle of beer, 1 shot liquor) 1 or 2   Have you ever used any of the following nicotine products? No   Have you or are you currently using street drugs or prescription strength medication for which you do not have a prescription for? No   Do you have a history of chemical dependency (alcohol or drug abuse)? No       PSYCHOLOGICAL HISTORY:   Psychological History Reviewed With Patient 8/11/2021   Have you ever attempted suicide? Never.   Have you had thoughts of suicide in the past year? No   Have you ever been hospitalized for mental illness or a suicide attempt? Never.   Do you have a history of chronic pain? No   Have you ever been diagnosed with fibromyalgia? No    Are you currently being treated for any of the following? (select all that apply) Anxiety   Are you currently seeing a therapist or counselor?  No   Are you currently seeing a psychiatrist? No       ROS:     8/11/2021   Skin:  Leg swelling, Varicose veins   HEENT: None of these   Musculoskeletal: Back pain, Swelling of legs   Cardiovascular: None of the above   Pulmonary: None of the above   Gastrointestinal: None of the above   Genitourinary: None of the above   Hematological: None of the above   Neurological: None of the above   Female only: Birth control       EATING BEHAVIORS:     8/11/2021   Have you or anyone else thought that you had an eating disorder? No   Do you currently binge eat (eat a large amount of food in a short time)? No   Are you an emotional eater? Yes   Do you get up to eat after falling asleep? No      Typically has 3 meals a day. Will bring a snack with to work    Breakfast- oatmeal or eggs   Lunch- salad, fruit   Snack-  Dinner- meat, veggie     Couple days a week needs to eat out     Craves chips, soda- tries to avoid buying those things     Needs to drink water very cold- brings water bottle to school     Prefers to avoid artificial sweeteners     EXERCISE:     8/11/2021   How often do you exercise? 1 to 2 times per week   What is the duration of your exercise (in minutes)? 20 Minutes   What types of exercise do you do? walking, swimming, home gym, other   What keeps you from being more active?  Pain, Lack of Time       MEDICATIONS:  Current Outpatient Medications   Medication Sig Dispense Refill     buPROPion (WELLBUTRIN XL) 150 MG 24 hr tablet Take 1 tablet (150 mg) by mouth every morning 90 tablet 3     Semaglutide-Weight Management (WEGOVY) 0.25 MG/0.5ML SOAJ Inject 0.25 mg Subcutaneous every 7 days 2 mL 0     Semaglutide-Weight Management (WEGOVY) 0.5 MG/0.5ML SOAJ Inject 0.5 mg Subcutaneous every 7 days Start after done with 0.25mg pens 2 mL 0       ALLERGIES:  Allergies   Allergen  "Reactions     No Known Drug Allergies        Office Visit on 07/09/2021   Component Date Value Ref Range Status     Cholesterol 07/09/2021 189  <200 mg/dL Final     Triglycerides 07/09/2021 110  <150 mg/dL Final    Fasting specimen     HDL Cholesterol 07/09/2021 48* >49 mg/dL Final     LDL Cholesterol Calculated 07/09/2021 119* <100 mg/dL Final    Comment: Above desirable:  100-129 mg/dl  Borderline High:  130-159 mg/dL  High:             160-189 mg/dL  Very high:       >189 mg/dl       Non HDL Cholesterol 07/09/2021 141* <130 mg/dL Final    Comment: Above Desirable:  130-159 mg/dl  Borderline high:  160-189 mg/dl  High:             190-219 mg/dl  Very high:       >219 mg/dl       Glucose 07/09/2021 77  70 - 99 mg/dL Final    Fasting specimen     Hemoglobin A1C 07/09/2021 5.8* 0 - 5.6 % Final    Comment: Normal <5.7% Prediabetes 5.7-6.4%  Diabetes 6.5% or higher - adopted from ADA   consensus guidelines.           PHYSICAL EXAM:  Objective    Ht 1.575 m (5' 2\")   Wt 109.8 kg (242 lb)   BMI 44.26 kg/m    Vitals - Patient Reported  Pain Score: No Pain (0)      Vitals:  No vitals were obtained today due to virtual visit.    Physical Exam   GENERAL: Healthy, alert and no distress  EYES: Eyes grossly normal to inspection.  No discharge or erythema, or obvious scleral/conjunctival abnormalities.  RESP: No audible wheeze, cough, or visible cyanosis.  No visible retractions or increased work of breathing.    SKIN: Visible skin clear. No significant rash, abnormal pigmentation or lesions.  NEURO: Cranial nerves grossly intact.  Mentation and speech appropriate for age.  PSYCH: Mentation appears normal, affect normal/bright, judgement and insight intact, normal speech and appearance well-groomed.      In summary, Deisi Miner has Class III obesity with a body mass index of Body mass index is 44.26 kg/m . kg/m2 and the comorbidities stated above. She has a history of Sleeve Gastrectomy in 2012 and is here to re-establish " bariatric care and discuss weight regain.    MEDICATIONS:        - Trial of wegovy- discussed if not covered by insurance, try to get coupon. Otherwise, could try ozempic given new prediabetes        - Continue other medications without change  CONSULTATION/REFERRAL to Lauren Bloch MTM Pharmacist    FUTURE LABS:       - Schedule a non-fasting blood draw- bariatric follow up labs-   FUTURE APPOINTMENTS:       - Follow-up visit in 2-3 months       Sincerely,     Marcella Bro NP

## 2021-08-12 NOTE — PATIENT INSTRUCTIONS
Goals:  1. Get lab work done    2. Review supplement recommendations, look into buying ones needed. Message me once labs are in and if any questions    3. Mindfulness with portions, listen to body (great job doing this already!). Pay attention to intake - be sure to focus on protein/vegetables first. Moderation with carbohydrates. Want to pair carbohydrates with protein/fat when having. Use caution with sugary beverages - not recommended due to effect on body.     A few post op guidelines we reviewed today:  - Separate fluids from meal time by 30 min before/after  - Carbonation not recommended   - Portions ~1 cup on average, pay attention to your needs    Carbohydrates  [email or web address]     Keeping Up Your Diet after Weight Loss Surgery  [email or web address]    Preventing Low Blood Sugar after Weight Loss Surgery  [email or web address]     Preventing Dumping Syndrome after Weight Loss Surgery  [email or web address]     -Take the following after a Sleeve Gastrectomy:    Multivitamin/minerals: adult dose 1-2 times daily  Ideally want one that provides:   5,000 - 10,000 international units vitamin A daily   800 mg oral folate daily  8 - 22 mg zinc and 1 - 2 mg copper daily    Chewable Multivitamin Options for After Surgery:  Bariatric Advantage Advanced Multi EA chewable (https://www.bariatricadEferioage.RegBinder/item/chewable-advanced-multi-ea)  - Discount code: UOFMINN (for 15% off order) and NEW10 (additional 10% off for new customers)     YouFigte Multi Complete 45 (https://Bioxiness Pharmaceuticals/products/nrvps-oinqulax-22?[email or web address])    Jumblets's Complete, or generic (with 18 mg iron per chew) (https://www.ForwardMetrics.com/p/kids-39-complete-multivitamin-chewable-tablets-orange-grape-38-cherry-150ct-up-38-up-8482/-/A-[email or web address]#lnk=sametab)       Iron: 45-60 mg elemental (18-36 mg if low risk) - may partly or fully be covered in multivitamin       Calcium Citrate containing  vitamin D: 500 mg 3 times daily or 600 mg 2 times daily    --Separate the calcium from your multivitmain or iron by at least 2 hours.    - Must be a chewable calcium citrate until post-op 3 months    - Options for calcium citrate: Mikal calcium citrate chewable, bariatric advantage calcium citrate chewable, Celebrate vitamins calcium citrate chewable, Bariatric Fusion calcium citrate chewable      Vit D - at least 3,000 international units/day between all supplements      Vitamin B12: sublingual form of at least 500 mcg daily or injection of 1000 mcg monthly       B-50 Complex once daily (depending on MVI chosen)  Want 12 mg thiamin between B complex and MVI

## 2021-08-12 NOTE — PROGRESS NOTES
"Deisi Miner is a 46 year old female who is being evaluated via a billable video visit.      The patient has been notified of following:     \"This video visit will be conducted via a call between you and your physician/provider. We have found that certain health care needs can be provided without the need for an in-person physical exam.  This service lets us provide the care you need with a video conversation.  If a prescription is necessary we can send it directly to your pharmacy.  If lab work is needed we can place an order for that and you can then stop by our lab to have the test done at a later time.    Video visits are billed at different rates depending on your insurance coverage.  Please reach out to your insurance provider with any questions.    If during the course of the call the physician/provider feels a video visit is not appropriate, you will not be charged for this service.\"    How would you like to obtain your AVS? MyChart  If the video visit is dropped, the invitation should be resent by: Text to cell phone: [phone]  Will anyone else be joining your video visit? No    Video-Visit Details    Type of service:  Video Visit    Total time spent with pt: 45 mins  *Utilized telephone and video due to issues with sound and connection.     Originating Location (pt. Location): Home     Distant Location (provider location):  [facility] [place]      Platform used for Video Visit: [employer]    During this virtual visit the patient is located in [place], patient verifies this as the location during the entirety of this visit.     Nutrition Assessment  Reason For Visit:  Deisi Miner is a 46 year old female presents today for new re-establish nutrition visit. Pt with history of sleeve gastrectomy in 2012 with Dr. Muhammad    Patient referred by Marcella Bro on August 12, 2021.    Patient with Co-morbidities of obesity including:  Type II DM no, prediabetes as of  A1C 5.8 " "  Renal Failure no  Sleep apnea no  Hypertension no   Dyslipidemia no  Joint pain yyes - pt reported per chart review  Back pain no  GERD no    HH with Dr. DE LA ROSA on 7/16/21    Anthropometrics:  Consult weight: 245 lbs  Day of surgery weight: 228 lbs, BMI 41  1 year po: 158 lbs    Weight 8/12/21: 242 lbs    Estimated body mass index is 44.26 kg/m  as calculated from the following:    Height as of an earlier encounter on 8/12/21: 1.575 m (5' 2\").    Weight as of an earlier encounter on 8/12/21: 109.8 kg (242 lb).    Current Vitamins/Minerals:   Not taking any currently     Was doing B12, MVI and Vit D. None for past 2-3 years    Needs labs done per pt/Marcella    Medications:   PA sent in for Wegovy    Phentermine- felt hungry evening/ nighttime   topiramate- bloating, tingling, dry mouth    New Pre-diabetes A1C 5.8 7/2021      Nutrition History:  Hx of sleeve in 2012, has not follow-up with RD since about 2015.    Was on good track, then got pregnant and hard to keep on track.     Goals for today: review post op diet guidelines, how to help prevent pre-diabetes     Fluids: aware of recommendation to separate, no carbonation, etc.  - Likes bubbly so sometimes does sparkling water mixed with a little juice (45 kcal per pt- Anabel)  - Tries to do less sugary options  - Tries to separate fluids  - Mostly drinks water, a little juice (OJ/fruit punch), bubbly water in can, Anabel, soda rarely - tries to get mini cans    Portions: ~ 1.5 to 2 cups and tries to stop when feeling full. Knows limits, use to get sick and throw up initally after surgery  - Still feels restriction. If going to [employer] - can only eat about 1/4 of her meal physically. Friends with her who are skinnier are able to eat whole meal and she is not able to even if she wanted.     Has been trying to take a salad to work - discussed plan for going forward to focus on protein/vegetable person first.    Discussed pre-diabetes and diabetes. Pt wanted to " know how to help prevent diabetes and what diabetes is. Pt wants to learn more about what carbohydrates are and what to limit/avoid.    Physical Activity:  Did not discuss     Additional information:  Regain with pregnancy 2 years postop- twins, lost one of the babies. Postparum weight gain also    FT     Recall Diet Questions Reviewed With Patient 8/11/2021   Describe what you typically consume for breakfast (typical or most recent): oatmeal, or a banana   Describe what you typically consume for lunch (typical or most recent): Salad or sandwich, fruit, juice   Describe what you typically consume for supper (typical or most recent): meat, veggie, bread   Describe what you typically consume as snacks (typical or most recent): nuts, fresh fruit, veggies   How many ounces of water, or other low calorie drinks, do you drink daily (8 oz=1 glass)? 32 oz   How many ounces of caffeine (coffee, tea, pop) do you drink daily (8 oz=1 glass)? 16 oz   How many ounces of carbonated (pop, beer, sparkling water) drinks do you drinky daily (8 oz=1 glass)? 8 oz   How many ounces of juice, pop, sweet tea, sports drinks, protein drinks, other sweetened drinks, do you drink daily (8 oz=1 glass)? 8 oz   How often do you drink alcohol? Monthly or less   If you do drink alcohol, how many drinks might you have in a day? (one drink = 5 oz. wine, 1 can/bottle of beer, 1 shot liquor) 1 or 2       Eating Habits 8/11/2021   Do you have any dietary restrictions? No   Do you currently binge eat (eat a large amount of food in a short time)? No   Are you an emotional eater? Yes   Do you get up to eat after falling asleep? No   What foods do you crave? chips, seafood       Nutrition Prescription:  Grams Protein: 50-60 (minimum)  Amount of Fluid: 48-64 oz (48 minimum)    Nutrition Diagnosis  Food and nutrition related knowledge deficit r/t dietary guidelines related to surgery aeb pt report of not remembering all the guidelines, 5-6  years since last meeting with an RD    Intervention  Materials/Education provided on maintenance dietary guidelines after bariatric surgery, portion sizes, separation of beverages from meals, vitamin and mineral supplements after weight loss surgery, and food choices. Reviewed current dietary habits  Discussed goals pt hopes to accomplish in RD appointments  Answered pt questions  Coordination of care   Nutrition education on food groups and diabetes/prediabetes  AVS and handouts via [employer]iting    Expected Engagement: fair-good    Goals:  1. Get lab work done    2. Review supplement recommendations, look into buying ones needed. Message me once labs are in and if any questions    3. Mindfulness with portions, listen to body (great job doing this already!). Pay attention to intake - be sure to focus on protein/vegetables first. Moderation with carbohydrates. Want to pair carbohydrates with protein/fat when having. Use caution with sugary beverages - not recommended due to effect on body.     A few post op guidelines we reviewed today:  - Separate fluids from meal time by 30 min before/after  - Carbonation not recommended   - Portions ~1 cup on average, pay attention to your needs    Carbohydrates  [email or web address]     Keeping Up Your Diet after Weight Loss Surgery  [email or web address]    Preventing Low Blood Sugar after Weight Loss Surgery  [email or web address]. [email or web address]     Preventing Dumping Syndrome after Weight Loss Surgery  [email or web address]     -Take the following after a Sleeve Gastrectomy:    Multivitamin/minerals: adult dose 1-2 times daily  Ideally want one that provides:   5,000 - 10,000 international units vitamin A daily   800 mg oral folate daily  8 - 22 mg zinc and 1 - 2 mg copper daily    Chewable Multivitamin Options for After Surgery:  Bariatric Advantage Advanced Multi EA chewable (https://www.bariatricadvantage.com/item/chewable-advanced-multi-ea)  - Discount  code: UOFMINN (for 15% off order) and NEW10 (additional 10% off for new customers)     Celebrate Multi Complete 45 (https://YaData.VeedMe/products/zwjaf-yjzqiuvk-11?[email or web address])    Flinstone's Complete, or generic (with 18 mg iron per chew) (https://www.target.com/p/kids-39-complete-multivitamin-chewable-tablets-orange-grape-38-cherry-150ct-up-38-up-8482/-/A-[email or web address]#lnk=sametab)       Iron: 45-60 mg elemental (18-36 mg if low risk) - may partly or fully be covered in multivitamin       Calcium Citrate containing vitamin D: 500 mg 3 times daily or 600 mg 2 times daily    --Separate the calcium from your multivitmain or iron by at least 2 hours.    - Must be a chewable calcium citrate until post-op 3 months    - Options for calcium citrate: Mikal calcium citrate chewable, bariatric advantage calcium citrate chewable, Celebrate vitamins calcium citrate chewable, Bariatric Fusion calcium citrate chewable      Vit D - at least 3,000 international units/day between all supplements      Vitamin B12: sublingual form of at least 500 mcg daily or injection of 1000 mcg monthly       B-50 Complex once daily (depending on MVI chosen)  Want 12 mg thiamin between B complex and MVI    Follow-Up:  PRN    Time spent with patient: 45 minutes.  MIRI Boggs, RD, LD

## 2021-08-12 NOTE — NURSING NOTE
"Chief Complaint   Patient presents with     Consult     New  est.       Vitals:    08/12/21 0710   Weight: 109.8 kg (242 lb)   Height: 1.575 m (5' 2\")       Body mass index is 44.26 kg/m .                          Judy Salazar, EMT  "

## 2021-08-12 NOTE — TELEPHONE ENCOUNTER
"Prior Authorization Retail Medication Request    Medication/Dose: Wegovy  ICD code (if different than what is on RX):    Morbid obesity (H) [E66.01]  - Primary       Obesity, Class III, BMI 40-49.9 (morbid obesity) (H) [E66.01]       Prediabetes [R73.03]       S/P laparoscopic sleeve gastrectomy [Z98.84]           Previously Tried and Failed:  Weight loss surgery, history of diet and exercise, Watching portions or calories, Exercise, Weight Watchers, Phentermine, Topiramate  Rationale:  I had the pleasure of seeing your patient, Deisi Miner, to re-establish bariatric care. As you know, Deisi Miner is 46 year old.  She has a height of 5' 2\", a weight of 242 lbs 0 oz, and calculated Body mass index is 44.26 kg/m .. She has a history of Sleeve Gastrectomy in 2012 at [facility] with Dr. Muhammad.    Phentermine- felt hungry evening/ nighttime   topiramate- bloating, tingling, dry mouth    New Pre-diabetes A1C 5.8 7/2021      Insurance Name:    Insurance ID:        Pharmacy Information (if different than what is on RX)  Name:  [facility] [address] [place] [id number] [address]  Phone:  [phone]  "

## 2021-08-12 NOTE — LETTER
"8/12/2021       RE: Deisi Miner  [address] [address]     Dear Colleague,    Thank you for referring your patient, Deisi Miner, to the [facility] WEIGHT MANAGEMENT CLINIC [place] at [facility]. Please see a copy of my visit note below.    Deisi Miner is a 46 year old female who is being evaluated via a billable video visit.      The patient has been notified of following:     \"This video visit will be conducted via a call between you and your physician/provider. We have found that certain health care needs can be provided without the need for an in-person physical exam.  This service lets us provide the care you need with a video conversation.  If a prescription is necessary we can send it directly to your pharmacy.  If lab work is needed we can place an order for that and you can then stop by our lab to have the test done at a later time.    Video visits are billed at different rates depending on your insurance coverage.  Please reach out to your insurance provider with any questions.    If during the course of the call the physician/provider feels a video visit is not appropriate, you will not be charged for this service.\"    How would you like to obtain your AVS? MyChart  If the video visit is dropped, the invitation should be resent by: Text to cell phone: [phone]  Will anyone else be joining your video visit? No    Video-Visit Details    Type of service:  Video Visit    Total time spent with pt: 45 mins  *Utilized telephone and video due to issues with sound and connection.     Originating Location (pt. Location): Home     Distant Location (provider location):  [facility] WEIGHT MANAGEMENT [facility]      Platform used for Video Visit: Zomato    During this virtual visit the patient is located in [place], patient verifies this as the location during the entirety of this visit.     Nutrition " "Assessment  Reason For Visit:  Deisi Miner is a 46 year old female presents today for new re-establish nutrition visit. Pt with history of sleeve gastrectomy in 2012 with Dr. Muhammad    Patient referred by Marcella Bro on August 12, 2021.    Patient with Co-morbidities of obesity including:  Type II DM no, prediabetes as of  A1C 5.8   Renal Failure no  Sleep apnea no  Hypertension no   Dyslipidemia no  Joint pain yyes - pt reported per chart review  Back pain no  GERD no    HH with Dr. DE LA ROSA on 7/16/21    Anthropometrics:  Consult weight: 245 lbs  Day of surgery weight: 228 lbs, BMI 41  1 year po: 158 lbs    Weight 8/12/21: 242 lbs    Estimated body mass index is 44.26 kg/m  as calculated from the following:    Height as of an earlier encounter on 8/12/21: 1.575 m (5' 2\").    Weight as of an earlier encounter on 8/12/21: 109.8 kg (242 lb).    Current Vitamins/Minerals:   Not taking any currently     Was doing B12, MVI and Vit D. None for past 2-3 years    Needs labs done per pt/Marcella    Medications:   PA sent in for Wegovy    Phentermine- felt hungry evening/ nighttime   topiramate- bloating, tingling, dry mouth    New Pre-diabetes A1C 5.8 7/2021      Nutrition History:  Hx of sleeve in 2012, has not follow-up with RD since about 2015.    Was on good track, then got pregnant and hard to keep on track.     Goals for today: review post op diet guidelines, how to help prevent pre-diabetes     Fluids: aware of recommendation to separate, no carbonation, etc.  - Likes bubbly so sometimes does sparkling water mixed with a little juice (45 kcal per pt- Anabel)  - Tries to do less sugary options  - Tries to separate fluids  - Mostly drinks water, a little juice (OJ/fruit punch), bubbly water in can, Anabel, soda rarely - tries to get mini cans    Portions: ~ 1.5 to 2 cups and tries to stop when feeling full. Knows limits, use to get sick and throw up initally after surgery  - Still feels restriction. If going to [employer] - can only eat about 1/4 of her meal physically. Friends with her who are skinnier are able to eat whole meal and she is not able to even if she wanted.     Has been trying to take a salad to work - discussed plan for going forward to focus on protein/vegetable person first.    Discussed pre-diabetes and diabetes. Pt wanted to know how to help prevent diabetes and what diabetes is. Pt wants to learn more about what carbohydrates are and what to limit/avoid.    Physical Activity:  Did not discuss     Additional information:  Regain with pregnancy 2 years postop- twins, lost one of the babies. Postparum weight gain also    FT     Recall Diet Questions Reviewed With Patient 8/11/2021   Describe what you typically consume for breakfast (typical or most recent): oatmeal, or a banana   Describe what you typically consume for lunch (typical or most recent): Salad or sandwich, fruit, juice   Describe what you typically consume for supper (typical or most recent): meat, veggie, bread   Describe what you typically consume as snacks (typical or most recent): nuts, fresh fruit, veggies   How many ounces of water, or other low calorie drinks, do you drink daily (8 oz=1 glass)? 32 oz   How many ounces of caffeine (coffee, tea, pop) do you drink daily (8 oz=1 glass)? 16 oz   How many ounces of carbonated (pop, beer, sparkling water) drinks do you drinky daily (8 oz=1 glass)? 8 oz   How many ounces of juice, pop, sweet tea, sports drinks, protein drinks, other sweetened drinks, do you drink daily (8 oz=1 glass)? 8 oz   How often do you drink alcohol? Monthly or less   If you do drink alcohol, how many drinks might you have in a day? (one drink = 5 oz. wine, 1 can/bottle of beer, 1 shot liquor) 1 or 2       Eating Habits 8/11/2021   Do you have any dietary restrictions? No   Do you currently binge eat (eat a large amount of food in a short time)? No   Are you an emotional eater? Yes   Do you get up to  eat after falling asleep? No   What foods do you crave? chips, seafood       Nutrition Prescription:  Grams Protein: 50-60 (minimum)  Amount of Fluid: 48-64 oz (48 minimum)    Nutrition Diagnosis  Food and nutrition related knowledge deficit r/t dietary guidelines related to surgery aeb pt report of not remembering all the guidelines, 5-6 years since last meeting with an RD    Intervention  Materials/Education provided on maintenance dietary guidelines after bariatric surgery, portion sizes, separation of beverages from meals, vitamin and mineral supplements after weight loss surgery, and food choices. Reviewed current dietary habits  Discussed goals pt hopes to accomplish in RD appointments  Answered pt questions  Coordination of care   Nutrition education on food groups and diabetes/prediabetes  AVS and handouts via [employer]    Expected Engagement: fair-good    Goals:  1. Get lab work done    2. Review supplement recommendations, look into buying ones needed. Message me once labs are in and if any questions    3. Mindfulness with portions, listen to body (great job doing this already!). Pay attention to intake - be sure to focus on protein/vegetables first. Moderation with carbohydrates. Want to pair carbohydrates with protein/fat when having. Use caution with sugary beverages - not recommended due to effect on body.     A few post op guidelines we reviewed today:  - Separate fluids from meal time by 30 min before/after  - Carbonation not recommended   - Portions ~1 cup on average, pay attention to your needs    Carbohydrates  [email or web address]     Keeping Up Your Diet after Weight Loss Surgery  [email or web address]    Preventing Low Blood Sugar after Weight Loss Surgery  [email or web address]     Preventing Dumping Syndrome after Weight Loss Surgery  [email or web address]     -Take the following after a Sleeve Gastrectomy:    Multivitamin/minerals: adult dose 1-2 times  daily  Ideally want one that provides:   5,000 - 10,000 international units vitamin A daily   800 mg oral folate daily  8 - 22 mg zinc and 1 - 2 mg copper daily    Chewable Multivitamin Options for After Surgery:  Bariatric Advantage Advanced Multi EA chewable (https://www.bariatricadMurfie.com/item/chewable-advanced-multi-ea)  - Discount code: UOFMINN (for 15% off order) and NEW10 (additional 10% off for new customers)     Celebrate Multi Complete 45 (https://TrackBill/products/fcuas-sddyxmwv-79?[email or web address])    Flinstone's Complete, or generic (with 18 mg iron per chew) (https://www.target.com/p/kids-39-complete-multivitamin-chewable-tablets-orange-grape-38-cherry-150ct-up-38-up-8482/-/A-[email or web address]#lnk=sametab)       Iron: 45-60 mg elemental (18-36 mg if low risk) - may partly or fully be covered in multivitamin       Calcium Citrate containing vitamin D: 500 mg 3 times daily or 600 mg 2 times daily    --Separate the calcium from your multivitmain or iron by at least 2 hours.    - Must be a chewable calcium citrate until post-op 3 months    - Options for calcium citrate: Mikal calcium citrate chewable, bariatric advantage calcium citrate chewable, Celebrate vitamins calcium citrate chewable, Bariatric Fusion calcium citrate chewable      Vit D - at least 3,000 international units/day between all supplements      Vitamin B12: sublingual form of at least 500 mcg daily or injection of 1000 mcg monthly       B-50 Complex once daily (depending on MVI chosen)  Want 12 mg thiamin between B complex and MVI    Follow-Up:  PRN    Time spent with patient: 45 minutes.  MIRI Boggs, RD, LD

## 2021-08-12 NOTE — PROGRESS NOTES
Deisi is a 46 year old who is being evaluated via a billable video visit.      How would you like to obtain your AVS? MyChart  If the video visit is dropped, the invitation should be resent by: Text to cell phone: [phone]  Will anyone else be joining your video visit? No      Video Start Time: 0730  Video-Visit Details    Type of service:  Video Visit    Video End Time:0811    Originating Location (pt. Location): Home    Distant Location (provider location):  [facility] WEIGHT MANAGEMENT CLINIC [place]     Platform used for Video Visit: [email or web address]

## 2021-08-12 NOTE — PATIENT INSTRUCTIONS
"It was a pleasure meeting with you today.    Thank you for allowing us the privilege of caring for you. We hope we provided you with the excellent service you deserve.   Please let us know if there is anything else we can do for you so that we can be sure you are leaving completely satisfied with your care experience.    To ensure the quality of our services you may be receiving a patient satisfaction survey from an independent patient satisfaction monitoring company.    The greatest compliment you can give is a \"Likely to Recommend\"      Your visit was with Marcella Bro NP today.     Instructions per today's visit:     MEDICATIONS:        - Trial of wegovy- discussed if not covered by insurance, try to get coupon. Otherwise, could try ozempic given new prediabetes        - Continue other medications without change  CONSULTATION/REFERRAL to Lauren Bloch [facility] Pharmacist    FUTURE LABS:       - Schedule a non-fasting blood draw- bariatric follow up labs-   FUTURE APPOINTMENTS:       - Follow-up visit in 2-3 months     To schedule appointments with our team, please call [phone] option #1    Please call during clinic hours Monday through Friday 8:00a - 4:00p if you have questions or you can contact us via [employer] at anytime.      Nurses: [phone]  Fax: [phone]  Surgery Scheduler: [phone]    Please call the hospital at [phone] to speak with our on call MDs if you have urgent needs after hours, during weekends, or holidays.    **Please note if you need to go to the Emergency Room for any reason in the first 90 days after surgery it is important to go to the [facility] on the [place] on [facility] off of the [address] off [address]    *For patients who are currently enrolled in our program and have not yet had weight loss surgery please note*:    You must be at your required goal weight at the time of your Anesthesia clinic visit as well as the day of surgery or your surgery will be " canceled. You will not be able to obtain a new surgery date until you have met your goal weight.    Lab results will be communicated through My Chart or letter (if My Chart not used). Please call the clinic if you have not received communication after 1 week or if you have any questions.?    Main follow-up parameters for all bariatric patients     Patients who have undergone Bariatric surgery:    1. Follow-up interval during the first year: ~1 week, 1 month, 3 month, 6 month,12 months.  Every 6 months until 5 years; and then annually thereafter.  The goal of follow-up sessions is to ensure that food intake behavior (choice of food and eating speed) and exercise/activity level are set appropriately.    2. Yearly lab tests ordered by our clinic.      3. The bariatric team should be aware and potentially evaluate all adverse gastrointestinal symptoms (dysphagia, abdominal pain, nausea, vomiting, diarrhea, heartburn, reflux, etc), which can be a sign of complication.  The bariatric surgeons perform general surgery procedures in addition to bariatric surgery (laparoscopic cholecystectomy, etc.)    4. Inability to tolerate textured food (chicken, steak, fish, eggs, beans) is NOT normal and may need to be evaluated by endoscopy performed by the bariatric surgeon.    _____________________________________________________________________________________________________________________________    Meal Replacement Products:    Here is the link to our new e-store where you can purchase our meal replacement products    [facility] E-Store  Clipik.As It Is/store    The one week starter kit is a great way to sample a variety of products and see what works for you.    If you want more information about the product go to: [employer] Games    Free Shipping for orders over $75     Benefits of meal replacements products:    Portion and calorie control  Improved nutrition  Structured eating  Simplified food  choices  Avoid contact with trigger foods  ______________________________________________________________________________________________________________________________    Healthy Lifestyle Support Group   [facility] Weight Management Program  Virtual Support Group Now Available    60 minutes of small group guided discussion, support and resources    Led by Health , Kath Nunez    For questions, and to receive the invite information, contact Kath at [email or web address]    All our welcome!    One Friday per month, 12:30pm to 1:30pm  SELF COMPASSION: July 31st  CREATING MY WELLNESS VISION: August 28th  MINDFULNESS PRACTICES FOR A CALM BODY & MIND: September 25th  MINDFUL EATING TOOLS: October 30th  HEALTHY& HAPPY HOLIDAYS: November 20th  OPEN FORUM: December 18th  _______________________________________________________________________________________________________________________________    Connections: Bariatric Care support group  Due to the Covid-19 restrictions, we will be doing our support group virtually using Microsoft Teams. You will need to get an invitation to the group from Munir Holly, Ph.D., LP at [email or web address] and to learn about using Microsoft Teams. The next meeting is on Tuesday, July 14 between 6:30 and 8 PM and there will be no formal speaker.    This group meets the second Tuesday of each month from 6:30 to 8 PM and will be held again at [facility] in the [address] [place] [address] (A-B room) when the Covid-19 restrictions are lifted. The group is led by Munir Holly, Ph.D., Licensed Psychologist, [facility] Weight Management Program. There is no cost for group participation and is open to all [facility] (and those external to this program) pre- and post-operative bariatric surgery patients as well as their support system.    _________________________________________________________________________________________________________________________________      Interested in working with a health ?  Health coaches work with you to improve your overall health and wellbeing.  They look at the whole person, and may involve discussion of different areas of life, including, but not limited to the four pillars of health (sleep, exercise, nutrition, and stress management). Discuss with your care team if you would like to start working a health .     Health Coaching-3 Pack:      $99 for three health coaching visits    Visits may be done in person or via phone    Coaching is a partnership between the  and the client; Coaches do not prescribe or diagnose    Coaching helps inspire the client to reach his/her personal goals   __________________________________________________________________________________________________________________________________    [place] [facility] Get Moving Program    Our team of physical therapists is trained to help you understand and take control of your condition. They will perform a thorough evaluation to determine your ability for activity and develop a customized plan to fit your goals and physical ability.  Scheduling: Unsure if the Get Moving program is right for you? Discuss the program with your medical provider or diabetes educator. You can also call us at [phone] to ask questions or schedule an appointment.   [facility] Get Moving Program  ______________________________________________________________________________________________________________________  Bluetooth Scale:    We hope to provide you with high quality telephone and virtual healthcare visits while social distancing for COVID-19 is necessary, as well as in the future when virtual visits may be more convenient for you.     Our technology team made it possible for Skycure scales to send weight measurements to our  electronic medical record. This allows weights from you weighing at home to securely flow into the medical record, which will improve telephone and virtual visits.   Additionally, studies have shown that adults actually lose more weight when their weights are automatically sent to someone else, and also that this process is not stressful for those adults.    Below is a link for purchasing the scale, with a discount code for our patients. You may call your insurance company to see if they will reimburse you for the cost of the scale, as a piece of durable medical equipment. The scales only go up to a weight of 400 pounds. This is an issue and we are working with the developer on increasing this. We found no scales that go over 400lb that have blue-tooth for connecting to [facility].    Scale to purchase: the Augmate  Body  Scale: https://www.CallFire/us/en/body/shop?gclid=[email or web address]=aw.ds    Discount Code: We have a discount code for our patients to bring the cost down to $50, the code is: [place]_Scale_20%off    Steps to link the scale to [facility] via an Android Phone (you can always disconnect at any point in the future):  1. The order must be placed first before the patient can access Track My Health within [facility].  2. Download Google Fit angela from the Google Play Store   a. Log in or register using your Google account   3. Download the [facility] angela from Google Play Store  a. Select add organization   b. Search for Color Eight and select it   c. Log into [facility]  d. Select Track My Health   e. Select the green connect my account button   f. When prompted log into your Google account   g. Select okay to confirm the account   4. Download the [facility] Health Mate angela from Google Play Store  a. [place] for Augmate   b. Go to profile   c. Tap google fit under the Apps section  d. Select the option to activate Google Fit integration   e. Select the same Google  account   f. Select okay to confirm the account  g.   Steps to link the scale to [employer] via an iPhone (you can always disconnect at any point in the future):  **Note [employer] is not available for download on an iPad**  1. The order must be placed first before the patient can access Track My Health within Takeac[employer].  2. Locate the Health yasir on your iPhone.  a. Set up your Apple Health account as prompted  b. The Sources page will show Apps that communicate with your Health yasir. Once all steps are completed, you should see Convertigo and Wysiwygt listed under the Apps section and your iPhone under the devices section.  i. Select Health Mate  1. Under 'ALLOW  Bloomfire  TO WRITE DATA ensure the toggle is on for Weight.  2. This will allow the scale to add your weight to the Apple Health  ii. Select MyChart  1. Under 'ALLOW  [employer]  TO READ DATA ensure the toggle is on for Weight.  2. This allows [employer] to grab the weight from [employer] Technologies so your provider can see your weights.  3. Download the [employer]acoder yasir from the Yasir Store   a. Select add organization                                                  b. Search for Spectral Image and select it  c. Log into Takeacoder  d. Select Track My Health   e. Select the green connect my account button   f. Follow prompts to link your device to [employer]acoder.  4. Download the [facility] health mate yasir in the Yasir Store   a. [place] for PrestoSports   b. Go to profile   c. [employer] Health under the Apps section  d. If prompted to allow access with the Health Yasir, toggle weight on for read and write access.

## 2021-08-12 NOTE — ASSESSMENT & PLAN NOTE
9 years s/p sleeve gastrectomy. Pregnancy 2 years post op with Post bariatric surgery weight gain. Mostly weight stable over the last 3-5 years but struggling to lose weight. Portions have increased over time but sill can't eat like she used to. Has added some liquid calories. Does note some emotional eating. Now, daughter is starting first grade and feels she needs to prioritize herself again. New diagnosis of prediabetes last month.     Will start trial of Wegovy- ozempic if no AOM coverage. No hx pancreatitis.     Dietitian visit today.

## 2021-08-12 NOTE — ASSESSMENT & PLAN NOTE
9years post op. Not taking vitamins currently. No recent bariatric labs. Labs ordered.     No reflux. No constipation. No abdominal pain.   Continued restriction but portions have grown over time. Not  meals from liquids. Does have some carbonation. Does drink some caloric beverages.

## 2021-08-12 NOTE — LETTER
"2021       RE: Deisi Miner  [address] [facility] [address]     Dear Colleague,    Thank you for referring your patient, Deisi Miner, to the [facility] [place] at [facility]. Please see a copy of my visit note below.    New Re-establish Bariatric Surgery Consultation Note    2021    RE: Deisi Miner  MR#: [id number]  : 1975      Referring provider: No flowsheet data found.    Chief Complaint/Reason for visit: re-establish bariatric care    Dear Alejandro Gifford MD (General),    I had the pleasure of seeing your patient, Deisi Miner, to re-establish bariatric care. As you know, Deisi Miner is 46 year old.  She has a height of 5' 2\", a weight of 242 lbs 0 oz, and calculated Body mass index is 44.26 kg/m .. She has a history of Sleeve Gastrectomy in  at [facility] with Dr. Muhammad.      Assessment & Plan   Problem List Items Addressed This Visit        Digestive    Obesity, Class III, BMI 40-49.9 (morbid obesity) (H) - Primary     9 years s/p sleeve gastrectomy. Pregnancy 2 years post op with Post bariatric surgery weight gain. Mostly weight stable over the last 3-5 years but struggling to lose weight. Portions have increased over time but sill can't eat like she used to. Has added some liquid calories. Does note some emotional eating. Now, daughter is starting first grade and feels she needs to prioritize herself again. New diagnosis of prediabetes last month.     Will start trial of Wegovy- ozempic if no AOM coverage. No hx pancreatitis.     Dietitian visit today.          Relevant Medications    Semaglutide-Weight Management (WEGOVY) 0.25 MG/0.5ML SOAJ    Semaglutide-Weight Management (WEGOVY) 0.5 MG/0.5ML SOAJ    Other Relevant Orders    Vitamin A    Vitamin B12    Vitamin D Deficiency    Parathyroid Hormone Intact    CBC with platelets    Comprehensive " metabolic panel    Med Therapy Management Referral       Endocrine    Prediabetes    Relevant Medications    Semaglutide-Weight Management (WEGOVY) 0.25 MG/0.5ML SOAJ    Semaglutide-Weight Management (WEGOVY) 0.5 MG/0.5ML SOAJ    Other Relevant Orders    Vitamin A    Vitamin B12    Vitamin D Deficiency    Parathyroid Hormone Intact    CBC with platelets    Comprehensive metabolic panel    Med Therapy Management Referral       Other    S/P laparoscopic sleeve gastrectomy     9years post op. Not taking vitamins currently. No recent bariatric labs. Labs ordered.     No reflux. No constipation. No abdominal pain.   Continued restriction but portions have grown over time. Not  meals from liquids. Does have some carbonation. Does drink some caloric beverages.          Relevant Medications    Semaglutide-Weight Management (WEGOVY) 0.25 MG/0.5ML SOAJ    Semaglutide-Weight Management (WEGOVY) 0.5 MG/0.5ML SOAJ    Other Relevant Orders    Vitamin A    Vitamin B12    Vitamin D Deficiency    Parathyroid Hormone Intact    CBC with platelets    Comprehensive metabolic panel    Med Therapy Management Referral           70 minutes spent on the date of the encounter doing chart review, history and exam, documentation and further activities per the note    Deisi Miner is a 46 year old female who presents to clinic today for the following health issues         HISTORY OF PRESENT ILLNESS:  Weight Loss History Reviewed with Patient 8/11/2021   How long have you been overweight? Since early childhood   What is the most that you have ever weighed? 260   What is the most weight you have lost? 70   I have tried the following methods to lose weight Watching portions or calories, Exercise, Weight Watchers, Weight Loss Surgery   I have tried the following weight loss medications? (Check all that apply) None   Have you ever had weight loss surgery? Yes   Please select the type of weight loss surgery you had (select all that  apply): sleeve gastrectomy     Sleeve and HH repair 2021 with Dr. DE LA ROSA   Consult 245lb   Day of surgery 228 (BMI 41)   1yr po 158lb (BMI 28.9)    Pregnancy - twins, one of the babies - daughter is now starting first grader    Last seen in our clinic 2017 with Teri Ramirez 259lb at that time     No reflux unless specific food triggers. Still gets uncomfortable with rice/ bread   Constipation- tries to increase water, not problematic     Phentermine- felt hungry evening/ nighttime   topiramate- bloating, tingling, dry mouth     CO-MORBIDITIES OF OBESITY INCLUDE:     2021   I have the following health issues associated with obesity: Pre-Diabetes     New Pre-diabetes A1C 5.8 2021     PAST MEDICAL HISTORY:  Past Medical History:   Diagnosis Date     Arthritis      Cancer (H)      Cervical high risk HPV (human papillomavirus) test positive 2017: NIL pap, + HR HPV (not 16 or 18) result.      Depression     anxiety, sees therapist     Diabetes (H)      Hypertension      Obesity        PAST SURGICAL HISTORY:  Past Surgical History:   Procedure Laterality Date     C IUD,MIRENA  2017      SECTION N/A 10/18/2014    Procedure:  SECTION;  Surgeon: Edilma Zayas MD;  Location: [facility] L+D     COLONOSCOPY       ENDOMETRIAL SAMPLING (BIOPSY) N/A 2017    Procedure: ENDOMETRIAL SAMPLING (BIOPSY);  Endometrial Biopsy with Mirena Intrauterine Device Placement;  Surgeon: Samantha Sultana MD;  Location: [facility] OR     GYN SURGERY  2013    Fallopian Tubes Removed     HC AMNIOCENTESIS DIAGNOSTIC  2014     HC AMNIOCENTESIS DIAGNOSTIC  2014     hsg  11    blockage of both tubes     INSERT INTRAUTERINE DEVICE N/A 2017    Procedure: INSERT INTRAUTERINE DEVICE;;  Surgeon: Samantha Sultana MD;  [facility]: [facility] OR     LAPAROSCOPIC GASTRIC SLEEVE  2012    Procedure: LAPAROSCOPIC GASTRIC SLEEVE;  Laparoscopic Sleeve Gastrectomy and Hiatal Hernia  Repair;  Surgeon: Eze Muhammad MD;  [facility]     LAPAROSCOPIC SALPINGECTOMY  5/7/2013    Procedure: LAPAROSCOPIC SALPINGECTOMY;  Operative Laparoscopy, lysis of adhesions, Bilateral Salpingectomies ;  Surgeon: Abdelrahman Corcoran MD;  Location: [facility] OR     U/S GUIDANCE FOR TVOR (CLINIC ONLY)  10/2013       FAMILY HISTORY:   Family History   Problem Relation Age of Onset     Diabetes Maternal Grandmother      Hypertension Maternal Grandmother      Mental Illness Maternal Grandmother      Cerebrovascular Disease Maternal Grandmother      Allergies Father      Hypertension Father      Prostate Cancer Father      Mental Illness Father      Obesity Father      Anxiety Disorder Mother      Mental Illness Mother      Respiratory Brother      Obesity Brother        SOCIAL HISTORY:   Social History Questions Reviewed With Patient 8/11/2021   Which best describes your employment status (select all that apply) I work full-time   If you work, what is your occupation?    Which best describes your marital status:    Do you have children? Yes   Who do you have in your support network that can be available to help you for the first 2 weeks after surgery? Yes   Who can you count on for support throughout your weight loss surgery journey? Family and friends   Can you afford 3 meals a day?  Yes   Can you afford 50-60 dollars a month for vitamins? 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 Monthly or less   If you do drink alcohol, how many drinks might you have in a day? (one drink = 5 oz. wine, 1 can/bottle of beer, 1 shot liquor) 1 or 2   Have you ever used any of the following nicotine products? No   Have you or are you currently using street drugs or prescription strength medication for which you do not have a prescription for? No   Do you have a history of chemical dependency (alcohol or drug abuse)? No       PSYCHOLOGICAL HISTORY:    Psychological History Reviewed With Patient 8/11/2021   Have you ever attempted suicide? Never.   Have you had thoughts of suicide in the past year? No   Have you ever been hospitalized for mental illness or a suicide attempt? Never.   Do you have a history of chronic pain? No   Have you ever been diagnosed with fibromyalgia? No   Are you currently being treated for any of the following? (select all that apply) Anxiety   Are you currently seeing a therapist or counselor?  No   Are you currently seeing a psychiatrist? No       ROS:     8/11/2021   Skin:  Leg swelling, Varicose veins   HEENT: None of these   Musculoskeletal: Back pain, Swelling of legs   Cardiovascular: None of the above   Pulmonary: None of the above   Gastrointestinal: None of the above   Genitourinary: None of the above   Hematological: None of the above   Neurological: None of the above   Female only: Birth control       EATING BEHAVIORS:     8/11/2021   Have you or anyone else thought that you had an eating disorder? No   Do you currently binge eat (eat a large amount of food in a short time)? No   Are you an emotional eater? Yes   Do you get up to eat after falling asleep? No      Typically has 3 meals a day. Will bring a snack with to work    Breakfast- oatmeal or eggs   Lunch- salad, fruit   Snack-  Dinner- meat, veggie     Couple days a week needs to eat out     Craves chips, soda- tries to avoid buying those things     Needs to drink water very cold- brings water bottle to school     Prefers to avoid artificial sweeteners     EXERCISE:     8/11/2021   How often do you exercise? 1 to 2 times per week   What is the duration of your exercise (in minutes)? 20 Minutes   What types of exercise do you do? walking, swimming, home gym, other   What keeps you from being more active?  Pain, Lack of Time       MEDICATIONS:  Current Outpatient Medications   Medication Sig Dispense Refill     buPROPion (WELLBUTRIN XL) 150 MG 24 hr tablet Take 1 tablet  "(150 mg) by mouth every morning 90 tablet 3     Semaglutide-Weight Management (WEGOVY) 0.25 MG/0.5ML SOAJ Inject 0.25 mg Subcutaneous every 7 days 2 mL 0     Semaglutide-Weight Management (WEGOVY) 0.5 MG/0.5ML SOAJ Inject 0.5 mg Subcutaneous every 7 days Start after done with 0.25mg pens 2 mL 0       ALLERGIES:  Allergies   Allergen Reactions     No Known Drug Allergies        Office Visit on 07/09/2021   Component Date Value Ref Range Status     Cholesterol 07/09/2021 189  <200 mg/dL Final     Triglycerides 07/09/2021 110  <150 mg/dL Final    Fasting specimen     HDL Cholesterol 07/09/2021 48* >49 mg/dL Final     LDL Cholesterol Calculated 07/09/2021 119* <100 mg/dL Final    Comment: Above desirable:  100-129 mg/dl  Borderline High:  130-159 mg/dL  High:             160-189 mg/dL  Very high:       >189 mg/dl       Non HDL Cholesterol 07/09/2021 141* <130 mg/dL Final    Comment: Above Desirable:  130-159 mg/dl  Borderline high:  160-189 mg/dl  High:             190-219 mg/dl  Very high:       >219 mg/dl       Glucose 07/09/2021 77  70 - 99 mg/dL Final    Fasting specimen     Hemoglobin A1C 07/09/2021 5.8* 0 - 5.6 % Final    Comment: Normal <5.7% Prediabetes 5.7-6.4%  Diabetes 6.5% or higher - adopted from ADA   consensus guidelines.           PHYSICAL EXAM:  Objective    Ht 1.575 m (5' 2\")   Wt 109.8 kg (242 lb)   BMI 44.26 kg/m    Vitals - Patient Reported  Pain Score: No Pain (0)      Vitals:  No vitals were obtained today due to virtual visit.    Physical Exam   GENERAL: Healthy, alert and no distress  EYES: Eyes grossly normal to inspection.  No discharge or erythema, or obvious scleral/conjunctival abnormalities.  RESP: No audible wheeze, cough, or visible cyanosis.  No visible retractions or increased work of breathing.    SKIN: Visible skin clear. No significant rash, abnormal pigmentation or lesions.  NEURO: Cranial nerves grossly intact.  Mentation and speech appropriate for age.  PSYCH: Mentation appears " normal, affect normal/bright, judgement and insight intact, normal speech and appearance well-groomed.      In summary, Deisi Miner has Class III obesity with a body mass index of Body mass index is 44.26 kg/m . kg/m2 and the comorbidities stated above. She has a history of Sleeve Gastrectomy in 2012 and is here to re-establish bariatric care and discuss weight regain.    MEDICATIONS:        - Trial of wegovy- discussed if not covered by insurance, try to get coupon. Otherwise, could try ozempic given new prediabetes        - Continue other medications without change  CONSULTATION/REFERRAL to Lauren Bloch [facility] Pharmacist    FUTURE LABS:       - Schedule a non-fasting blood draw- bariatric follow up labs-   FUTURE APPOINTMENTS:       - Follow-up visit in 2-3 months       Sincerely,     Marcella Bro NP           Deisi is a 46 year old who is being evaluated via a billable video visit.      How would you like to obtain your AVS? MyChart  If the video visit is dropped, the invitation should be resent by: Text to cell phone: [phone]  Will anyone else be joining your video visit? No      Video Start Time: 0730  Video-Visit Details    Type of service:  Video Visit    Video End Time:0811    Originating Location (pt. Location): Home    Distant Location (provider location):  [facility] WE[facility]NAGEMENT [facility]INIC [place]     Platform used for Video Visit: [employer]

## 2021-08-13 ENCOUNTER — TELEPHONE (OUTPATIENT)
Dept: ENDOCRINOLOGY | Facility: CLINIC | Age: 46
End: 2021-08-13

## 2021-08-13 ASSESSMENT — ANXIETY QUESTIONNAIRES: GAD7 TOTAL SCORE: 12

## 2021-08-13 NOTE — TELEPHONE ENCOUNTER
Central Prior Authorization Team   Phone: [phone]      PA Initiation    Medication: Wegovy-PA initiated  Insurance Company:    Pharmacy Filling the Rx: [facility] [address] [place] [id number] [address]  Filling Pharmacy Phone: [phone]  Filling Pharmacy Fax:    Start Date: 8/13/2021

## 2021-08-13 NOTE — TELEPHONE ENCOUNTER
LVM, pt to schedule 2 month f/u with [facility] around 10/12/21. Sent [employer].

## 2021-08-17 ENCOUNTER — TELEPHONE (OUTPATIENT)
Dept: ENDOCRINOLOGY | Facility: CLINIC | Age: 46
End: 2021-08-17

## 2021-08-17 DIAGNOSIS — R73.03 PREDIABETES: Primary | ICD-10-CM

## 2021-08-17 DIAGNOSIS — Z98.84 S/P LAPAROSCOPIC SLEEVE GASTRECTOMY: ICD-10-CM

## 2021-08-17 DIAGNOSIS — E66.01 OBESITY, CLASS III, BMI 40-49.9 (MORBID OBESITY) (H): ICD-10-CM

## 2021-08-17 RX ORDER — SEMAGLUTIDE 1.34 MG/ML
INJECTION, SOLUTION SUBCUTANEOUS
Qty: 1.5 ML | Refills: 3 | Status: SHIPPED | OUTPATIENT
Start: 2021-08-17 | End: 2021-09-27

## 2021-08-17 NOTE — TELEPHONE ENCOUNTER
Prior Authorization Not Needed per Insurance-Per [facility] at [facility], this is a plan exclusion and is not eligible for a PA. It will not be reviewed.     Medication: Wegovy-PA Not Needed  Insurance Company: [employer] - Phone [phone] Fax [phone]  Expected CoPay:      [facility] [address] [place] [id number] [facility]  Pharmacy Notified: No  Patient Notified: No

## 2021-08-17 NOTE — TELEPHONE ENCOUNTER
"Prior Authorization Retail Medication Request    Medication/Dose: Ozempic  ICD code (if different than what is on RX):    Prediabetes [R73.03]  - Primary       S/P laparoscopic sleeve gastrectomy [Z98.84]       Obesity, Class III, BMI 40-49.9 (morbid obesity) (H) [E66.01]           Previously Tried and Failed:  Weight loss surgery, history of diet and exercise, Watching portions or calories, Exercise, Weight Watchers, Phentermine, Topiramate  Rationale:   I had the pleasure of seeing your patient, Deisi Miner, to re-establish bariatric care. As you know, Deisi Miner is 46 year old.  She has a height of 5' 2\", a weight of 242 lbs 0 oz, and calculated Body mass index is 44.26 kg/m .. She has a history of Sleeve Gastrectomy in 2012 at [facility] with Dr. Muhammad.     Phentermine- felt hungry evening/ nighttime   topiramate- bloating, tingling, dry mouth    New Pre-diabetes A1C 5.8 7/2021      Wegovy not covered by insurance    Insurance Name:    Insurance ID:        Pharmacy Information (if different than what is on RX)  Name:  [facility] [id number] [address]  Phone:  [phone]  "

## 2021-08-17 NOTE — TELEPHONE ENCOUNTER
Ezekiel kim. Per Marcella Bro, will send Ozempic for pre-diabetes. Patient notified of med change via [employer].

## 2021-08-18 NOTE — TELEPHONE ENCOUNTER
Central Prior Authorization Team   Phone: [phone]      PA Initiation    Medication: Ozempic-PA initiated  Insurance Company: [email or web address] - Phone [phone] Fax [phone]  Pharmacy Filling the Rx: [facility] [address] [place] [address] [address]  Filling Pharmacy Phone: [phone]  Filling Pharmacy Fax:    Start Date: 8/18/2021

## 2021-08-19 RX ORDER — BUSPIRONE HYDROCHLORIDE 10 MG/1
10 TABLET ORAL 2 TIMES DAILY
Qty: 90 TABLET | Refills: 1 | Status: SHIPPED | OUTPATIENT
Start: 2021-08-19 | End: 2021-10-01

## 2021-08-19 NOTE — TELEPHONE ENCOUNTER
PRIOR AUTHORIZATION DENIED    Medication: Ozempic-PA denied    Denial Date: 8/19/2021    Denial Rational:         Appeal Information:

## 2021-09-03 ENCOUNTER — LAB (OUTPATIENT)
Dept: LAB | Facility: CLINIC | Age: 46
End: 2021-09-03
Payer: COMMERCIAL

## 2021-09-03 DIAGNOSIS — R73.03 PREDIABETES: ICD-10-CM

## 2021-09-03 DIAGNOSIS — E66.01 OBESITY, CLASS III, BMI 40-49.9 (MORBID OBESITY) (H): ICD-10-CM

## 2021-09-03 DIAGNOSIS — E66.01 MORBID OBESITY (H): ICD-10-CM

## 2021-09-03 DIAGNOSIS — Z98.84 S/P LAPAROSCOPIC SLEEVE GASTRECTOMY: ICD-10-CM

## 2021-09-03 LAB
ALBUMIN SERPL-MCNC: 3.1 G/DL (ref 3.4–5)
ALP SERPL-CCNC: 39 U/L (ref 40–150)
ALT SERPL W P-5'-P-CCNC: 23 U/L (ref 0–50)
ANION GAP SERPL CALCULATED.3IONS-SCNC: 5 MMOL/L (ref 3–14)
AST SERPL W P-5'-P-CCNC: 16 U/L (ref 0–45)
BILIRUB SERPL-MCNC: 0.4 MG/DL (ref 0.2–1.3)
BUN SERPL-MCNC: 7 MG/DL (ref 7–30)
CALCIUM SERPL-MCNC: 8.7 MG/DL (ref 8.5–10.1)
CHLORIDE BLD-SCNC: 106 MMOL/L (ref 94–109)
CO2 SERPL-SCNC: 26 MMOL/L (ref 20–32)
CREAT SERPL-MCNC: 0.8 MG/DL (ref 0.52–1.04)
DEPRECATED CALCIDIOL+CALCIFEROL SERPL-MC: 28 UG/L (ref 20–75)
ERYTHROCYTE [DISTWIDTH] IN BLOOD BY AUTOMATED COUNT: 13.9 % (ref 10–15)
GFR SERPL CREATININE-BSD FRML MDRD: 89 ML/MIN/1.73M2
GLUCOSE BLD-MCNC: 94 MG/DL (ref 70–99)
HCT VFR BLD AUTO: 38.4 % (ref 35–47)
HGB BLD-MCNC: 12.8 G/DL (ref 11.7–15.7)
MCH RBC QN AUTO: 27.4 PG (ref 26.5–33)
MCHC RBC AUTO-ENTMCNC: 33.3 G/DL (ref 31.5–36.5)
MCV RBC AUTO: 82 FL (ref 78–100)
PLATELET # BLD AUTO: 302 10E3/UL (ref 150–450)
POTASSIUM BLD-SCNC: 3.9 MMOL/L (ref 3.4–5.3)
PROT SERPL-MCNC: 7.4 G/DL (ref 6.8–8.8)
PTH-INTACT SERPL-MCNC: 39 PG/ML (ref 18–80)
RBC # BLD AUTO: 4.67 10E6/UL (ref 3.8–5.2)
SODIUM SERPL-SCNC: 137 MMOL/L (ref 133–144)
VIT B12 SERPL-MCNC: 530 PG/ML (ref 193–986)
WBC # BLD AUTO: 6.7 10E3/UL (ref 4–11)

## 2021-09-03 PROCEDURE — 36415 COLL VENOUS BLD VENIPUNCTURE: CPT

## 2021-09-03 PROCEDURE — 85027 COMPLETE CBC AUTOMATED: CPT

## 2021-09-03 PROCEDURE — 82607 VITAMIN B-12: CPT

## 2021-09-03 PROCEDURE — 83970 ASSAY OF PARATHORMONE: CPT

## 2021-09-03 PROCEDURE — 82306 VITAMIN D 25 HYDROXY: CPT

## 2021-09-03 PROCEDURE — 84590 ASSAY OF VITAMIN A: CPT | Mod: 90

## 2021-09-03 PROCEDURE — 99000 SPECIMEN HANDLING OFFICE-LAB: CPT

## 2021-09-03 PROCEDURE — 80053 COMPREHEN METABOLIC PANEL: CPT

## 2021-09-05 ENCOUNTER — HEALTH MAINTENANCE LETTER (OUTPATIENT)
Age: 46
End: 2021-09-05

## 2021-09-06 LAB
ANNOTATION COMMENT IMP: NORMAL
RETINYL PALMITATE SERPL-MCNC: <0.02 MG/L
VIT A SERPL-MCNC: 0.5 MG/L

## 2021-09-23 ENCOUNTER — VIRTUAL VISIT (OUTPATIENT)
Dept: PHARMACY | Facility: CLINIC | Age: 46
End: 2021-09-23
Payer: COMMERCIAL

## 2021-09-23 DIAGNOSIS — Z53.9 ERRONEOUS ENCOUNTER--DISREGARD: Primary | ICD-10-CM

## 2021-09-23 NOTE — PROGRESS NOTES
Patient was scheduled for MTM visit today. However, patient reports that she was never started on WLM so didn't want to follow up. She reports having difficulty configuring next steps for med start. Had been working with care team to get HRA form filled out to identify if patient could use money from HRA to help to pay for weight loss medication as weight loss medications are excluded from patient's insurance. Patient was encouraged to reach out to HRA to ensure form was received. From there can send in prescription for weight loss medication, awaiting to hear from patient.     Lauren Bloch, PharmD  Medication Therapy Management Pharmacist   [facility] [place]

## 2021-09-27 DIAGNOSIS — E66.01 OBESITY, CLASS III, BMI 40-49.9 (MORBID OBESITY) (H): Primary | ICD-10-CM

## 2021-09-27 NOTE — TELEPHONE ENCOUNTER
Patient to potentially start Qsymia 7.5-46 mg once daily. Patient to utilize HRA if approved via form that was sent in 1 month ago. See [employer] information from today for more information. Routing Qsymia rx for provider to sign.     BP Readings from Last 3 Encounters:   07/09/21 114/85   03/02/20 122/86   05/06/19 120/70     Lauren Bloch, PharmD  Medication Therapy Management Pharmacist   [facility] [place]

## 2021-09-28 RX ORDER — PHENTERMINE AND TOPIRAMATE 7.5; 46 MG/1; MG/1
1 CAPSULE, EXTENDED RELEASE ORAL EVERY MORNING
Qty: 30 CAPSULE | Refills: 2 | Status: SHIPPED | OUTPATIENT
Start: 2021-09-28 | End: 2022-04-10

## 2021-09-29 ENCOUNTER — E-VISIT (OUTPATIENT)
Dept: OBGYN | Facility: CLINIC | Age: 46
End: 2021-09-29
Payer: COMMERCIAL

## 2021-09-29 DIAGNOSIS — F41.1 GENERALIZED ANXIETY DISORDER: ICD-10-CM

## 2021-09-29 PROCEDURE — 99421 OL DIG E/M SVC 5-10 MIN: CPT | Performed by: OBSTETRICS & GYNECOLOGY

## 2021-09-29 ASSESSMENT — ANXIETY QUESTIONNAIRES
2. NOT BEING ABLE TO STOP OR CONTROL WORRYING: NOT AT ALL
1. FEELING NERVOUS, ANXIOUS, OR ON EDGE: SEVERAL DAYS
7. FEELING AFRAID AS IF SOMETHING AWFUL MIGHT HAPPEN: NOT AT ALL
GAD7 TOTAL SCORE: 3
5. BEING SO RESTLESS THAT IT IS HARD TO SIT STILL: NOT AT ALL
7. FEELING AFRAID AS IF SOMETHING AWFUL MIGHT HAPPEN: NOT AT ALL
8. IF YOU CHECKED OFF ANY PROBLEMS, HOW DIFFICULT HAVE THESE MADE IT FOR YOU TO DO YOUR WORK, TAKE CARE OF THINGS AT HOME, OR GET ALONG WITH OTHER PEOPLE?: NOT DIFFICULT AT ALL
GAD7 TOTAL SCORE: 3
GAD7 TOTAL SCORE: 3
3. WORRYING TOO MUCH ABOUT DIFFERENT THINGS: NOT AT ALL
6. BECOMING EASILY ANNOYED OR IRRITABLE: SEVERAL DAYS
4. TROUBLE RELAXING: SEVERAL DAYS

## 2021-09-30 ASSESSMENT — ANXIETY QUESTIONNAIRES: GAD7 TOTAL SCORE: 3

## 2021-09-30 NOTE — TELEPHONE ENCOUNTER
DENISE-7 SCORE 2021   Total Score - - -   Total Score - 12 (moderate anxiety) 3 (minimal anxiety)   Total Score 13 12 3     Deisi Miner is a 46 year old  who is taking Buspar for anxiety. Anxiety improved per DENISE scores. Refills sent.  Edilma Zayas MD       Provider E-Visit time total (minutes): 8

## 2021-10-01 RX ORDER — BUSPIRONE HYDROCHLORIDE 10 MG/1
10 TABLET ORAL 2 TIMES DAILY
Qty: 180 TABLET | Refills: 3 | Status: SHIPPED | OUTPATIENT
Start: 2021-10-01 | End: 2023-05-01

## 2021-10-12 ENCOUNTER — VIRTUAL VISIT (OUTPATIENT)
Dept: ENDOCRINOLOGY | Facility: CLINIC | Age: 46
End: 2021-10-12
Payer: COMMERCIAL

## 2021-10-12 VITALS — HEIGHT: 62 IN | WEIGHT: 232 LBS | BODY MASS INDEX: 42.69 KG/M2

## 2021-10-12 DIAGNOSIS — Z98.84 S/P LAPAROSCOPIC SLEEVE GASTRECTOMY: ICD-10-CM

## 2021-10-12 DIAGNOSIS — E66.01 OBESITY, CLASS III, BMI 40-49.9 (MORBID OBESITY) (H): Primary | ICD-10-CM

## 2021-10-12 PROCEDURE — 99213 OFFICE O/P EST LOW 20 MIN: CPT | Mod: 95 | Performed by: NURSE PRACTITIONER

## 2021-10-12 RX ORDER — TOPIRAMATE 25 MG/1
TABLET, FILM COATED ORAL
Qty: 90 TABLET | Refills: 3 | Status: SHIPPED | OUTPATIENT
Start: 2021-10-12 | End: 2022-04-10

## 2021-10-12 ASSESSMENT — MIFFLIN-ST. JEOR: SCORE: 1650.6

## 2021-10-12 ASSESSMENT — PAIN SCALES - GENERAL: PAINLEVEL: NO PAIN (0)

## 2021-10-12 NOTE — PATIENT INSTRUCTIONS
"Thank you for allowing us the privilege of caring for you. We hope we provided you with the excellent service you deserve.   Please let us know if there is anything else we can do for you so that we can be sure you are completely satisfied with your care experience.    To ensure the quality of our services you may be receiving a patient satisfaction survey from an independent patient satisfaction monitoring company.    The greatest compliment you can give is a \"Likely to Recommend\"    Your visit was with Marcella Bro NP today.    Instructions per today's visit:     Larry Miner, it was great to visit with you today.  Here is a review of our visit.  If our clinic scheduler is not able to reach you please call [phone] to schedule your next appointments.    Hold qsymia   Start topiramate (50 mg- 2 tab) in the evening, increase to 75mg next week   Continue to work on regular meals   Let me know if too hungry in a couple of weeks once off qsymia   Can use melatonin for sleep until phentermine out of your system      Information about Video Visits with [place]: video visit information  _________________________________________________________________________________________________________________________________________________________  Important contact and scheduling information:  Please call our contact center at [phone] to schedule your next appointments.  To find a lab location near you, please call [phone].  For any nursing questions or concerns call Rhoda Alanis LPN at [phone] or Windy Cruz RN at [phone]  Please call during clinic hours Monday through Friday 8:00a - 4:00p if you have questions or you can contact us via MyCabbage at anytime and we will reply during clinic hours.    Lab results will be communicated through My Chart or letter (if My Chart not used). Please call the clinic if you have not received communication after 1 week or if you have any " questions.?  Clinic Fax: [phone]  _________________________________________________________________________________________________________________________________________________________  Meal Replacement Products:    Here is the link to our new e-store where you can purchase our meal replacement products    [facility] E-Store  [employer].Direct Sitters/store    The one week starter kit is a great way to sample a variety of products and see what works for you.    If you want more information about the product go to: Fresh Steps Meals  Fonmatch.Glassful    If you are an employee or [facility] or [facility] please contact your care team for a 10% estore discount    Free Shipping for orders over $75     Benefits of meal replacements products:    Portion and calorie control  Improved nutrition  Structured eating  Simplified food choices  Avoid contact with trigger foods  _________________________________________________________________________________________________________________________________________________________  Interested in working with a health ?  Health coaches work with you to improve your overall health and wellbeing.  They look at the whole person, and may involve discussion of different areas of life, including, but not limited to the four pillars of health (sleep, exercise, nutrition, and stress management). Discuss with your care team if you would like to start working a health .  Health Coaching-3 Pack: Schedule by calling [phone]    $99 for three health coaching visits    Visits may be done in person or via phone    Coaching is a partnership between the  and the client; Coaches do not prescribe or diagnose    Coaching helps inspire the client to reach his/her personal goals   _________________________________________________________________________________________________________________________________________________________  24 Week Healthy  Lifestyle Plan:    Our mission in the 24-week Healthy Lifestyle Plan is to provide you with individualized care by giving you the tools, education and support you need to lose weight and maintain a healthy lifestyle. In your 24-week journey, you ll be supported by a dedicated weight loss team that includes registered dietitians, medical weight management providers, health coaches, and nurses -- all with special expertise in weight loss -- to help you every step of the way.     Monthly meetings with your registered dietician or medical weight management provider help to review your progress, update your care plan, and make any adjustments needed to ensure success. Between these visits, weekly and bi-weekly health  visits will help you focus on the four pillars of weight loss -- stress, sleep, nutrition, and exercise -- and how you can best adapt each to achieve sustainable weight loss results.    In addition, you will be given exclusive access to online wellbeing classes through [employer].  Your initial visit will be with a medical weight management provider who will help to understand your weight loss goals and ensure this program is the right fit for you. Please let our team know if you are interested in the 24 week plan by sending a message to your care team or calling [phone] to schedule.  _________________________________________________________________________________________________________________________________________________________    COMPREHENSIVE WEIGHT MANAGEMENT PROGRAM  VIRTUAL SUPPORT GROUPS    For Support Group Information:      We offer support groups for patients who are working on weight loss and considering, preparing for or have had weight loss surgery.   There is no cost for this opportunity.  You are invited to attend the?Virtual Support Groups?provided by any of the following locations:    1. [place] via Microsoft Teams with La Nena Munguia RN  2.   [place] via TextHub  "with Munir Holly, PhD, LP  3.   [place] via Micro[employer] Solutions Teams with Kath Ferro RN  4.   [facility] via Microbial [employer] Teams with Kath Nunez [facility]    The following Support Group information can also be found on our website:  [email or web address]      [facility] Weight Loss Surgery Support Group    [facility] Weight Loss Surgery Support Group  The support group is a patient-lead forum that meets monthly to share experiences, encouragement and education. It is open to those who have had weight loss surgery, are scheduled for surgery, and those who are considering surgery.   WHEN: This group meets on the 3rd Wednesday of each month from 5:00PM - 6:00PM virtually using Microsoft Teams.   FACILITATOR: Led by La Nena Munguia, the program's Clinical Coordinator.   TO REGISTER: Please contact the clinic via EasyPost or call the nurse line directly at [phone] to inform our staff that you would like an invite sent to you and to let us know the email you would like the invite sent to. Prior to the meeting, a link with directions on how to join the meeting will be sent to you.    2021 Meetings  August 18: \"Let's Talk\" a time for the group to share.  September 15: \"Let's Talk\" a time for the group to share.  October 20: \"Let's Talk\" a time for the group to share.  November 17: Brenda Heart RD, PRERNA \"Protein, Metabolism and Meal Planning\"  December 15: Ravinder Lozano RD, LD will speak, \"Recipe Modification\"    [facility] Support Groups    Connections: Bariatric Care Support Group?  This is open to all [facility] (and those external to this program) pre- and post- operative bariatric surgery patients as well as their support system.   WHEN: This group meets the 2nd Tuesday of each month from 6:30 PM - 8:00 PM virtually using Microsoft Teams.   FACILITATOR: Led by Munir Holly, Ph.D who " "is a Licensed Psychologist with the [facility] Comprehensive Weight Management Program.   TO REGISTER: Please send an email to Munir Holly, Ph.D., LP at?[email or web address]?if you would like an invitation to the group and to learn about using Microsoft Teams.    2021 Meetings  August 10: Open Forum  September 14: Guest Speaker: Kath Ferro RN,CBN, CIC, [facility] Comprehensive Weight Management Program, \"Your Hospital Stay and Post-Operative Compliance\"  October 12: Open Forum  November 9: Guest Speaker: Dori Alves RD,LD, [facility] Comprehensive Weight Management Program,\"Holiday Eating\".  December 14: Guest Speaker Estrella Hu MD, MPH, Located within [facility], [facility], \"Body Contouring Surgery for Bariatric Surgery Patients\"     Connections: Post-Operative Bariatric Surgery Support Group  This is a support group for [facility] bariatric patients (and those external to [facility]) who have had bariatric surgery and are at least 3 months post-surgery.  WHEN: This support group meets the 4th Wednesday of the month from 11:00 AM - 12:00 PM virtually using Microsoft Teams.   FACILITATOR: Led by Certified Bariatric Nurse, Kath Ferro RN.   TO REGISTER: Please send an email to Kath at [email or web address] if you would like an invitation to the group and to learn about using [employer].      [facility] Healthy Lifestyle Virtual Support Group    Healthy Lifestyle Virtual Support Group?  This is 60 minutes of small group guided discussion, support and resources. All are welcome who want a healthy lifestyle.  WHEN: This group meets monthly on a Friday from 12:30 PM - to 1:30 PM virtually using Microsoft Teams.   FACILITATOR: Led by National Board Certified Health , Kath Nunez [facility].   TO REGISTER: Please send an email to Kath [email or web address] to receive monthly invites to the group or if you have any " questions about having a health .  Prior to the meeting, a link with directions on how to join the meeting will be sent to you.    2021 Meetings  August 27: Open Forum  September 24: Sleep and the 7 Types of Rest  October 29: Open Forum  November 19: Gratitude     December 10: Open Forum  _________________________________________________________________________________________________________________________________________________________  [facility] Get Moving Program  Our team of physical therapists is trained to help you understand and take control of your condition. They will perform a thorough evaluation to determine your ability for activity and develop a customized plan to fit your goals and physical ability.  Scheduling: Unsure if the Get Moving program is right for you? Discuss the program with your medical provider or diabetes educator. You can also call us at [phone] to ask questions or schedule an appointment.   [facility] Get Moving Program  _________________________________________________________________________________________________________________________________________________________  [facility] Diabetes Prevention Program (DPP)  If you have prediabetes and Medicare please contact us via [employer] LLChart to learn more about the Diabetes Prevention Program (DPP)  Program Details:  [facility] offers the year-long Diabetes Prevention Program (DPP). The program helps you to make lifestyle changes that prevent or delay type 2 diabetes by supporting healthy eating, increased physical activity, stress reduction and use of coping skills.   On average, previous [facility] DPP cohorts have lost and maintained at least 5% of their starting weight throughout the program and averaged more than 150 minutes of physical activity per week.  Participants meet weekly for one-hour group sessions over sixteen weeks, every other week for the next 8 weeks, and monthly for the last  six months.   A year-long maintenance program is also available for participants who complete the first year.   Location & Cost:   During the COVID-19 Public Health Emergency, the program is offered virtually. When in-person classes can resume, they will be held at [facility].  For people with Medicare, the program is covered in full. A self-pay option will also be available for those with non-Medicare insurance plans.   _________________________________________________________________________________________________________________________________________________________  Bluetooth Scale:    We hope to provide you with high quality virtual healthcare visits while social distancing for COVID-19 is necessary, as well as in the future when virtual visits may be more convenient for you.     Our technology team made it possible for Bluetooth scales to send weight measurements to our electronic medical record. This allows weights from you weighing at home to securely flow into the medical record, which will improve telephone and virtual visits.   Additionally, studies have shown that adults actually lose more weight when their weights are automatically sent to someone else, and also that this process is not stressful for those adults.    Below is a link for purchasing the scale, with a discount code for our patients. You may call your insurance company to see if they will reimburse you for the cost of the scale, as a piece of durable medical equipment. The scales only go up to a weight of 400 pounds. This is an issue and we are working with the developer on increasing this. We found no scales that go over 400lb that have blue-tooth for connecting to [employer].    Scale to purchase: the [employer]  Body  Scale: [email or web address]=aw.ds    Discount Code: We have a discount code for our patients to bring  the cost down to $50, Discount code is: [place]_Scale_20%off      Thank you,   [facility] Comprehensive Weight Management Team

## 2021-10-12 NOTE — NURSING NOTE
"Chief Complaint   Patient presents with     Follow Up     Follow-up Weight Management       Vitals:    10/12/21 1609   Weight: 105.2 kg (232 lb)   Height: 1.575 m (5' 2\")       Body mass index is 42.43 kg/m .                            "

## 2021-10-12 NOTE — LETTER
10/12/2021       RE: Deisi Miner  [id number] [address]     Dear Colleague,    Thank you for referring your patient, Deisi Miner, to the [facility] [place] [facility]. Please see a copy of my visit note below.    Deisi is a 46 year old who is being evaluated via a billable video visit.      How would you like to obtain your AVS? MyChart  If the video visit is dropped, the invitation should be resent by: Text to cell phone: [phone]  Will anyone else be joining your video visit? No      Video Start Time:   Video-Visit Details    Type of service:  Video Visit    Video End Time:    Originating Location (pt. Location): Home    Distant Location (provider location):  [facility] [place]     Platform used for Video Visit: Bilna       27 minutes spent on the date of the encounter doing chart review, history and exam, documentation and further activities per the note    Return Medical Weight Management Note     Deisi Miner  MRN:  [id number]  :  1975  MARIANO:  10/12/2021    Dear Alejandro Gifford MD,    I had the pleasure of seeing your patient Deisi Miner. She is a 46 year old female who I am continuing to see for treatment of obesity related to:       2021   I have the following health issues associated with obesity: Pre-Diabetes       Assessment & Plan   Problem List Items Addressed This Visit        Digestive    Obesity, Class III, BMI 40-49.9 (morbid obesity) (H) - Primary     Down 10lb since last seen. Started Qsymia 4 weeks ago. Decreased thoughts about food and hunger. Decreased urges to eat at night. Does feel more hungry during the day when normally she wouldn't be hungry all day. Eating more regular meals and snacks which is helping. Has eliminated soda. Experiencing some tingling, not bothersome. Insomnia since starting qsymia-  can fall asleep but not stay asleep. Will try stopping the qsymia and switching to topiramate only- start at 50mg in the evening and increase to 75mg in the evening. She will try this for a couple of weeks. Could consider the 8mg phentermine (not controlled release like qsymia) if too hungry on topiramate alone.             Other    S/P laparoscopic sleeve gastrectomy             INTERVAL HISTORY:  Initial visit 8/12/2021- post bariatric surgery weight gain, s/p sleeve. Wanted to try GLP1 but not covered by insurance. Patient hesitant about metformin due to not being able to get away to bathroom frequently enough.     Started Qsymia end of September - insomnia (difficulty staying asleep) for first 3 days.     Today:   Continues to consistently wake up in the middle of the night and     CURRENT WEIGHT:   232 lbs 0 oz    Initial Weight (lbs): 242 lbs  Last Visits Weight: 109.8 kg (242 lb)  Cumulative weight loss (lbs): 10  Weight Loss Percentage: 4.13%    Changes and Difficulties 10/9/2021   I have made the following changes to my diet since my last visit: More fruits, veggies, less sugary drinks, more protien   With regards to my diet, I am still struggling with: Things to drink besides water   I have made the following changes to my activity/exercise since my last visit: More physical movement walking   With regards to my activity/exercise, I am still struggling with: Knee pain-had appointments for ohysical therapy scheduled         MEDICATIONS:   Current Outpatient Medications   Medication Sig Dispense Refill     busPIRone (BUSPAR) 10 MG tablet Take 1 tablet (10 mg) by mouth 2 times daily 180 tablet 3     Phentermine-Topiramate (QSYMIA) 7.5-46 MG CP24 Take 1 capsule by mouth every morning 30 capsule 2       Weight Loss Medication History Reviewed With Patient 10/9/2021   Which weight loss medications are you currently taking on a regular basis?  Qysmia (phentermine/topiramate)   If you are not taking a weight loss  medication that was prescribed to you, please indicate why: -   Are you having any side effects from the weight loss medication that we have prescribed you? Yes   If you are having side effects please describe: Sleeping problems, tingling fingers, trouble focusing       Lab on 09/03/2021   Component Date Value Ref Range Status     Vitamin A 09/03/2021 0.50  0.30 - 1.20 mg/L Final     Retinol Palmitate 09/03/2021 <0.02  0.00 - 0.10 mg/L Final     Vitamin A Interp 09/03/2021 Normal   Final      This test was developed and its performance characteristics   determined by [facility]. It has not been cleared or   approved by the US Food and Drug Administration. This test   was performed in a CLIA certified laboratory and is   intended for clinical purposes.     Vitamin B12 09/03/2021 530  193 - 986 pg/mL Final     Vitamin D, Total (25-Hydroxy) 09/03/2021 28  20 - 75 ug/L Final     Parathyroid Hormone Intact 09/03/2021 39  18 - 80 pg/mL Final     WBC Count 09/03/2021 6.7  4.0 - 11.0 10e3/uL Final     RBC Count 09/03/2021 4.67  3.80 - 5.20 10e6/uL Final     Hemoglobin 09/03/2021 12.8  11.7 - 15.7 g/dL Final     Hematocrit 09/03/2021 38.4  35.0 - 47.0 % Final     MCV 09/03/2021 82  78 - 100 fL Final     MCH 09/03/2021 27.4  26.5 - 33.0 pg Final     MCHC 09/03/2021 33.3  31.5 - 36.5 g/dL Final     RDW 09/03/2021 13.9  10.0 - 15.0 % Final     Platelet Count 09/03/2021 302  150 - 450 10e3/uL Final     Sodium 09/03/2021 137  133 - 144 mmol/L Final     Potassium 09/03/2021 3.9  3.4 - 5.3 mmol/L Final     Chloride 09/03/2021 106  94 - 109 mmol/L Final     Carbon Dioxide (CO2) 09/03/2021 26  20 - 32 mmol/L Final     Anion Gap 09/03/2021 5  3 - 14 mmol/L Final     Urea Nitrogen 09/03/2021 7  7 - 30 mg/dL Final     Creatinine 09/03/2021 0.80  0.52 - 1.04 mg/dL Final     Calcium 09/03/2021 8.7  8.5 - 10.1 mg/dL Final     Glucose 09/03/2021 94  70 - 99 mg/dL Final     Alkaline Phosphatase 09/03/2021 39* 40 - 150 U/L Final      AST 09/03/2021 16  0 - 45 U/L Final     ALT 09/03/2021 23  0 - 50 U/L Final     Protein Total 09/03/2021 7.4  6.8 - 8.8 g/dL Final     Albumin 09/03/2021 3.1* 3.4 - 5.0 g/dL Final     Bilirubin Total 09/03/2021 0.4  0.2 - 1.3 mg/dL Final     GFR Estimate 09/03/2021 89  >60 mL/min/1.73m2 Final    As of July 11, 2021, eGFR is calculated by the CKD-EPI creatinine equation, without race adjustment. eGFR can be influenced by muscle mass, exercise, and diet. The reported eGFR is an estimation only and is only applicable if the renal function is stable.       PHYSICAL EXAM:  Objective      Vitals - Patient Reported  Pain Score: No Pain (0)        GENERAL: Healthy, alert and no distress  EYES: Eyes grossly normal to inspection.  No discharge or erythema, or obvious scleral/conjunctival abnormalities.  RESP: No audible wheeze, cough, or visible cyanosis.  No visible retractions or increased work of breathing.    SKIN: Visible skin clear. No significant rash, abnormal pigmentation or lesions.  NEURO: Cranial nerves grossly intact.  Mentation and speech appropriate for age.  PSYCH: Mentation appears normal, affect normal/bright, judgement and insight intact, normal speech and appearance well-groomed.        Sincerely,    Marcella Bro NP

## 2022-04-10 ENCOUNTER — OFFICE VISIT (OUTPATIENT)
Dept: URGENT CARE | Facility: URGENT CARE | Age: 47
End: 2022-04-10
Payer: COMMERCIAL

## 2022-04-10 VITALS
HEART RATE: 75 BPM | SYSTOLIC BLOOD PRESSURE: 116 MMHG | OXYGEN SATURATION: 99 % | TEMPERATURE: 97.2 F | DIASTOLIC BLOOD PRESSURE: 72 MMHG | RESPIRATION RATE: 16 BRPM

## 2022-04-10 DIAGNOSIS — J02.9 ACUTE PHARYNGITIS, UNSPECIFIED ETIOLOGY: ICD-10-CM

## 2022-04-10 DIAGNOSIS — J20.9 ACUTE BRONCHITIS, UNSPECIFIED ORGANISM: Primary | ICD-10-CM

## 2022-04-10 DIAGNOSIS — R05.9 COUGH: ICD-10-CM

## 2022-04-10 LAB
DEPRECATED S PYO AG THROAT QL EIA: NEGATIVE
GROUP A STREP BY PCR: NOT DETECTED

## 2022-04-10 PROCEDURE — 87651 STREP A DNA AMP PROBE: CPT | Performed by: FAMILY MEDICINE

## 2022-04-10 PROCEDURE — 99213 OFFICE O/P EST LOW 20 MIN: CPT | Performed by: FAMILY MEDICINE

## 2022-04-10 RX ORDER — BENZONATATE 100 MG/1
200 CAPSULE ORAL 3 TIMES DAILY PRN
Qty: 42 CAPSULE | Refills: 3 | Status: SHIPPED | OUTPATIENT
Start: 2022-04-10 | End: 2023-01-09

## 2022-04-10 NOTE — PROGRESS NOTES
SUBJECTIVE:   Deisi Miner is a 47 year old female presenting with a chief complaint of sore throat  for the past two days, cough (worsening yesterday with some disturbance of her sleep, productive of green phlegm) for the past 2.5 days.  , .  Course of illness is worsening.      Treatment measures tried include Nyquil, over the counter cough drops.  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  .      Patient's April 9, 2022, at-home COVID-19 test was negative.     She received the second Pfizer COVID-19 vaccine dose on March 24, 2021.  She got the booster dose in the past.  .      .    Past Medical History:   Diagnosis Date     Arthritis      Cancer (H)      Cervical high risk HPV (human papillomavirus) test positive 05/01/2017 05/01/17: NIL pap, + HR HPV (not 16 or 18) result.      Depression     anxiety, sees therapist     Diabetes (H)      Hypertension      Obesity      Current Outpatient Medications   Medication Sig Dispense Refill     busPIRone (BUSPAR) 10 MG tablet Take 1 tablet (10 mg) by mouth 2 times daily 180 tablet 3     Social History     Tobacco Use     Smoking status: Never Smoker     Smokeless tobacco: Never Used   Substance Use Topics     Alcohol use: No     Comment: rarely       ROS:  CONSTITUTIONAL:NEGATIVE  for fevers.   ENT/MOUTH:  Positive for sore throat, nasal congestion  RESP:  Positive for cough.     OBJECTIVE:  /72   Pulse 75   Temp 97.2  F (36.2  C) (Tympanic)   Resp 16   LMP  (LMP Unknown)   SpO2 99%   Breastfeeding No   GENERAL APPEARANCE: healthy, alert and no distress  HENT: TM's normal bilaterally, nasal turbinates erythematous, swollen and oropharynx is erythematous without exudates.    NECK: supple, nontender, no lymphadenopathy  RESP: lungs clear to auscultation - no rales, rhonchi or wheezes  CV: regular rates and rhythm, normal S1 S2, no murmur noted    LAB:    Results for orders placed or performed in visit on 04/10/22   Streptococcus A Rapid  Screen w/Reflex to PCR     Status: Normal    Specimen: Throat; Swab   Result Value Ref Range    Group A Strep antigen Negative Negative         ASSESSMENT:  Sore Throat  Cough  Acute Bronchitis    PLAN:  For the cough and acute bronchitis:  Rx:  Tessalon Perles  Steam  follow up if not better in 10 days.     For the sore throat:  Pending lab:  Strep PCR Test.   Ibuprofen      Lenard Liang MD

## 2022-04-10 NOTE — PATIENT INSTRUCTIONS
Inhale steam through the mouth.      Drink plenty of water.      Take Ibuprofen to decrease the pain or to decrease swelling at the vocal cords.      Follow up if not better in 10 days.

## 2022-04-17 ENCOUNTER — HEALTH MAINTENANCE LETTER (OUTPATIENT)
Age: 47
End: 2022-04-17

## 2022-10-23 ENCOUNTER — HEALTH MAINTENANCE LETTER (OUTPATIENT)
Age: 47
End: 2022-10-23

## 2023-01-09 ENCOUNTER — VIRTUAL VISIT (OUTPATIENT)
Dept: FAMILY MEDICINE | Facility: CLINIC | Age: 48
End: 2023-01-09
Payer: COMMERCIAL

## 2023-01-09 ENCOUNTER — E-VISIT (OUTPATIENT)
Dept: URGENT CARE | Facility: CLINIC | Age: 48
End: 2023-01-09
Payer: COMMERCIAL

## 2023-01-09 DIAGNOSIS — U07.1 COVID-19 VIRUS INFECTION: Primary | ICD-10-CM

## 2023-01-09 DIAGNOSIS — U07.1 INFECTION DUE TO 2019 NOVEL CORONAVIRUS: Primary | ICD-10-CM

## 2023-01-09 PROCEDURE — 99213 OFFICE O/P EST LOW 20 MIN: CPT | Mod: CS | Performed by: PHYSICIAN ASSISTANT

## 2023-01-09 PROCEDURE — 99207 PR NO CHARGE LOS: CPT | Performed by: FAMILY MEDICINE

## 2023-01-09 ASSESSMENT — PATIENT HEALTH QUESTIONNAIRE - PHQ9
SUM OF ALL RESPONSES TO PHQ QUESTIONS 1-9: 2
10. IF YOU CHECKED OFF ANY PROBLEMS, HOW DIFFICULT HAVE THESE PROBLEMS MADE IT FOR YOU TO DO YOUR WORK, TAKE CARE OF THINGS AT HOME, OR GET ALONG WITH OTHER PEOPLE: NOT DIFFICULT AT ALL
SUM OF ALL RESPONSES TO PHQ QUESTIONS 1-9: 2

## 2023-01-09 NOTE — PROGRESS NOTES
Deisi is a 47 year old who is being evaluated via a billable video visit.      How would you like to obtain your AVS? MyChart  If the video visit is dropped, the invitation should be resent by: Text to cell phone: [phone]  Will anyone else be joining your video visit? No    Assessment & Plan   Problem List Items Addressed This Visit    None  Visit Diagnoses     COVID-19 virus infection    -  Primary    Relevant Medications    nirmatrelvir and ritonavir (PAXLOVID) therapy pack         Impression is COVID-19. Positive home test. Fully vaccinated and boosted x 2. Appears well and non-toxic and I have low suspicion for impending airway obstruction or respiratory distress at this point.  She will push p.o. fluids, use over-the-counter meds for symptoms, complete a course of Paxlovid after discussing risks/benefits, and follow-up with us in 2 weeks if not improving or urgent care/the ER if symptoms worsen/change at any time.    DDx and Dx discussed with and explained to the pt to their satisfaction.  All questions were answered at this time. Pt expressed understanding of and agreement with this dx, tx, and plan. No further workup warranted and standard medication warnings given. I have given the patient a list of pertinent indications for re-evaluation. Will go to the Emergency Department if symptoms worsen or new concerning symptoms arise. Patient left the call in no apparent distress.     Prescription drug management     See Patient Instructions  COVID-19 positive patient.  Encounter for consideration of medication intervention. Patient does qualify for a prescription. Full discussion with patient including medication options, risks and benefits. Potential drug interactions reviewed with patient.     Treatment Planned Paxlovid  Temporary change to home medications: will take half a tablet of buspirone (5mg) twice daily for the next 8 days while on Paxlovid.    Estimated body mass index is 42.43 kg/m  as calculated  "from the following:    Height as of 10/12/21: 1.575 m (5' 2\").    Weight as of 10/12/21: 105.2 kg (232 lb).  GFR Estimate   Date Value Ref Range Status   09/03/2021 89 >60 mL/min/1.73m2 Final     Comment:     As of July 11, 2021, eGFR is calculated by the CKD-EPI creatinine equation, without race adjustment. eGFR can be influenced by muscle mass, exercise, and diet. The reported eGFR is an estimation only and is only applicable if the renal function is stable.   07/10/2017 81 >60 mL/min/1.7m2 Final     Comment:     Non  GFR Calc     No results found for: [id number]    Return in about 2 weeks (around 1/23/2023) for a recheck of your symptoms if not improving, or call 911/go to an ER anytime if worsening.    JEVON Campos  [facility] DOV Lipscomb is a 47 year old presenting for the following health issues:  Suspected Covid      HPI       COVID-19 Symptom Review  How many days ago did these symptoms start? 3 days    Are any of the following symptoms significant for you?    New or worsening difficulty breathing? No    Worsening cough? No    Fever or chills? Yes, I felt feverish or had chills.    Headache: YES    Sore throat: YES    Chest pain: No    Diarrhea: YES    Body aches? YES    What treatments has patient tried? Muccinex, cough drops  Does patient live in a nursing home, group home, or shelter? No  Does patient have a way to get food/medications during quarantined? Yes  No previous history of COVID.    Review of Systems   Constitutional, HEENT, cardiovascular, pulmonary, gi and gu systems are negative, except as otherwise noted.      Objective           Vitals:  No vitals were obtained today due to virtual visit.    Physical Exam   GENERAL: Healthy, alert and no distress  EYES: Eyes grossly normal to inspection.  No discharge or erythema, or obvious scleral/conjunctival abnormalities.  RESP: No audible wheeze, cough, or visible cyanosis.  No visible " retractions or increased work of breathing.    SKIN: Visible skin clear. No significant rash, abnormal pigmentation or lesions.  NEURO: Cranial nerves grossly intact.  Mentation and speech appropriate for age.  PSYCH: Mentation appears normal, affect normal/bright, judgement and insight intact, normal speech and appearance well-groomed.    Video-Visit Details    Type of service:  Video Visit     Originating Location (pt. Location): Home  Distant Location (provider location):  On-site  Platform used for Video Visit: Doximity    Answers for HPI/ROS submitted by the patient on 1/9/2023  If you checked off any problems, how difficult have these problems made it for you to do your work, take care of things at home, or get along with other people?: Not difficult at all  PHQ9 TOTAL SCORE: 2

## 2023-01-09 NOTE — PATIENT INSTRUCTIONS
Yes- you would qualify for Paxlovid!!  Please call [phone] to schedule a virtual visit for COVID treatment-- this cannot be done via an evisit.

## 2023-01-10 NOTE — PATIENT INSTRUCTIONS
John Lipscomb,    Thank you for allowing [facility] to manage your care.    Take half a tablet of buspirone (5mg) twice daily for the next 8 days while on Paxlovid.    If you develop worsening/changing symptoms at any time, please call 911 or go to the emergency department for evaluation.    I sent your prescriptions to your pharmacy.    Drink 8-10 glasses of fluid daily to stay well-hydrated.    For your pain, please use Tylenol 650mg every 6 hours. You may use 400mg of ibuprofen between doses of Tylenol.     Max acetaminophen (Tylenol) 4,000mg/24 hours  Max ibuprofen 3,000mg/24 hours    If you have any questions or concerns, please feel free to call us at [phone]    Sincerely,    Kushal Yates PA-C    Did you know?      You can schedule a video visit for follow-up appointments as well as future appointments for certain conditions.  Please see the below link.     [email or web address]    If you have not already done so,  I encourage you to sign up for [facility] ([email or web address]).  This will allow you to review your results, securely communicate with a provider, and schedule virtual visits as well.

## 2023-04-04 ENCOUNTER — VIRTUAL VISIT (OUTPATIENT)
Dept: FAMILY MEDICINE | Facility: CLINIC | Age: 48
End: 2023-04-04
Payer: COMMERCIAL

## 2023-04-04 DIAGNOSIS — J06.9 VIRAL URI WITH COUGH: Primary | ICD-10-CM

## 2023-04-04 PROCEDURE — 99213 OFFICE O/P EST LOW 20 MIN: CPT | Mod: VID | Performed by: FAMILY MEDICINE

## 2023-04-04 RX ORDER — BENZONATATE 200 MG/1
200 CAPSULE ORAL 3 TIMES DAILY PRN
Qty: 20 CAPSULE | Refills: 1 | Status: SHIPPED | OUTPATIENT
Start: 2023-04-04

## 2023-04-04 ASSESSMENT — PATIENT HEALTH QUESTIONNAIRE - PHQ9
SUM OF ALL RESPONSES TO PHQ QUESTIONS 1-9: 8
SUM OF ALL RESPONSES TO PHQ QUESTIONS 1-9: 8

## 2023-04-04 NOTE — PROGRESS NOTES
Deisi is a 48 year old who is being evaluated via a billable video visit.      How would you like to obtain your AVS? MyChart  If the video visit is dropped, the invitation should be resent by: Text to cell phone: [phone]  Will anyone else be joining your video visit? No        Assessment & Plan     Viral URI with cough  Fluids/ rest  Follow up only if unimproved.   - benzonatate (TESSALON) 200 MG capsule  Dispense: 20 capsule; Refill: 1               See Patient Instructions    Alejandro Gifford MD  [facility]    Lakeshia Lipscomb is a 48 year old, presenting for the following health issues:  No chief complaint on file.         View : No data to display.              HPI     Acute Illness  Acute illness concerns: URI/ chest cold  Onset/Duration: 1-2 weeks  Symptoms:  Fever: No  Chills/Sweats: No  Headache (location?): No  Sinus Pressure: No  Conjunctivitis:  No  Ear Pain: no  Rhinorrhea: YES  Congestion: YES  Sore Throat: YES  Cough: YES-non-productive  Wheeze: No  Decreased Appetite: No         Review of Systems   Constitutional, HEENT, cardiovascular, pulmonary, gi and gu systems are negative, except as otherwise noted.      Objective           Vitals:  No vitals were obtained today due to virtual visit.    Physical Exam   GENERAL: alert and no distress  EYES: Eyes grossly normal to inspection.  No discharge or erythema, or obvious scleral/conjunctival abnormalities.  RESP: No audible wheeze, cough, or visible cyanosis.  No visible retractions or increased work of breathing.    SKIN: Visible skin clear. No significant rash, abnormal pigmentation or lesions.  NEURO: Cranial nerves grossly intact.  Mentation and speech appropriate for age.  PSYCH: Mentation appears normal, affect normal/bright, judgement and insight intact, normal speech and appearance well-groomed.    No results found for any visits on 04/04/23.            Video-Visit Details    Type of service:  Video Visit      Originating Location (pt. Location): Home  Distant Location (provider location):  On-site  Platform used for Video Visit: Moment    Answers for HPI/ROS submitted by the patient on 4/4/2023  PHQ9 TOTAL SCORE: 8  How many servings of fruits and vegetables do you eat daily?: 2-3  On average, how many sweetened beverages do you drink each day (Examples: soda, juice, sweet tea, etc.  Do NOT count diet or artificially sweetened beverages)?: 2  How many minutes a day do you exercise enough to make your heart beat faster?: 10 to 19  How many days a week do you exercise enough to make your heart beat faster?: 3 or less  How many days per week do you miss taking your medication?: 0  What is the reason for your visit today?: Cough that won't go away and a little bit of a voice change due to constant coughing.  When did your symptoms begin?: 1-2 weeks ago  What are your symptoms?: coughing up thick yellow/green phlegm.  voice change due to constant coughing.  How would you describe these symptoms?: Moderate  Are your symptoms:: Staying the same  Have you had these symptoms before?: Yes  Have you tried or received treatment for these symptoms before?: Yes  Did that treatment work? : Yes  Please describe the treatment you had:: medication for bronchitis/laryngitis  Is there anything that makes you feel worse?: laying flat.  Is there anything that makes you feel better?: sitting up to sleep

## 2023-05-01 ENCOUNTER — OFFICE VISIT (OUTPATIENT)
Dept: OBGYN | Facility: CLINIC | Age: 48
End: 2023-05-01
Payer: COMMERCIAL

## 2023-05-01 VITALS
BODY MASS INDEX: 42.88 KG/M2 | DIASTOLIC BLOOD PRESSURE: 76 MMHG | SYSTOLIC BLOOD PRESSURE: 117 MMHG | OXYGEN SATURATION: 97 % | WEIGHT: 233 LBS | HEART RATE: 84 BPM | HEIGHT: 62 IN

## 2023-05-01 DIAGNOSIS — Z13.220 SCREENING FOR LIPID DISORDERS: ICD-10-CM

## 2023-05-01 DIAGNOSIS — Z13.29 SCREENING FOR THYROID DISORDER: ICD-10-CM

## 2023-05-01 DIAGNOSIS — F41.1 GENERALIZED ANXIETY DISORDER: ICD-10-CM

## 2023-05-01 DIAGNOSIS — Z01.419 ENCOUNTER FOR GYNECOLOGICAL EXAMINATION WITHOUT ABNORMAL FINDING: Primary | ICD-10-CM

## 2023-05-01 DIAGNOSIS — Z13.0 SCREENING, ANEMIA, DEFICIENCY, IRON: ICD-10-CM

## 2023-05-01 DIAGNOSIS — Z13.1 SCREENING FOR DIABETES MELLITUS: ICD-10-CM

## 2023-05-01 PROCEDURE — 99396 PREV VISIT EST AGE 40-64: CPT | Performed by: OBSTETRICS & GYNECOLOGY

## 2023-05-01 RX ORDER — BUSPIRONE HYDROCHLORIDE 10 MG/1
10 TABLET ORAL 2 TIMES DAILY
Qty: 180 TABLET | Refills: 3 | Status: SHIPPED | OUTPATIENT
Start: 2023-05-01

## 2023-05-01 NOTE — PROGRESS NOTES
Deisi is a 48 year old  female who presents for annual exam.     Menses are rare  No LMP recorded. (Menstrual status: IUD).. Using IUD for contraception.  She is not currently considering pregnancy.  Besides routine health maintenance, she has no other health concerns today . mirena  with rare bleeding.  Weight is down 14 pounds since last visit.  Has already had colonoscopy completed in 2017 that was normal and health maintenance was updated.  Daughter is 9 y/o and has high thyroid, already taller than she is.  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   GYNECOLOGIC HISTORY:  Menarche:   Deisi is sexually active with 1male partner(s) and is currently in monogamous relationship.    History sexually transmitted infections:No STD history  STI testing offered?  Declined  BOB exposure: Unknown  History of abnormal Pap smear: NO - age 30-65 PAP every 5 years with negative HPV co-testing recommended  Family history of breast CA: No  Family history of uterine/ovarian CA: No    Family history of colon CA: No    HEALTH MAINTENANCE:  Cholesterol: (  Cholesterol   Date Value Ref Range Status   2021 189 <200 mg/dL Final   2020 199 <200 mg/dL Final    History of abnormal lipids: No  Mammo: 5/10/19 . History of abnormal Mammo: No.  Regular Self Breast Exams: No  Calcium/Vitamin D intake: source:  dairy, dietary supplement(s) Adequate? Yes  TSH: (  TSH   Date Value Ref Range Status   2020 2.51 0.40 - 4.00 mU/L Final    )  Pap; (  Lab Results   Component Value Date    PAP NIL 2020    PAP NIL 2018    PAP NIL 2017    )    HISTORY:  OB History    Para Term  AB Living   1 1 0 1 0 1   SAB IAB Ectopic Multiple Live Births   0 0 0 1 2      # Outcome Date GA Lbr Artur/2nd Weight Sex Delivery Anes PTL Lv   1A  10/18/14 34w1d  1.25 kg (2 lb 12.1 oz) M CS-LTranv Spinal  ND      Name: Altaf      Apgar1: 6  Apgar5: 7   1B  10/18/14 34w1d  1.3 kg (2 lb 13.9 oz) F CS-LTranv  Spinal  KARI      Name: Sera      Apgar1: 8  Apgar5: 9     Past Medical History:   Diagnosis Date     Arthritis      Cancer (H)      Cervical high risk HPV (human papillomavirus) test positive 2017: NIL pap, + HR HPV (not 16 or 18) result.      Depression     anxiety, sees therapist     Diabetes (H)      Hypertension      Obesity      Past Surgical History:   Procedure Laterality Date     C IUD,MIRENA  2017      SECTION N/A 10/18/2014    Procedure:  SECTION;  Surgeon: Edilma Zayas MD;  Location: [facility] L[facility]D     COLONOSCOPY       ENDOMETRIAL SAMPLING (BIOPSY) N/A 2017    Procedure: ENDOMETRIAL SAMPLING (BIOPSY);  Endometrial Biopsy with Mirena Intrauterine Device Placement;  Surgeon: Samantha Sultana MD;  Location: [facility] OR     GYN SURGERY  2013    Fallopian Tubes Removed     HC AMNIOCENTESIS DIAGNOSTIC  2014     HC AMNIOCENTESIS DIAGNOSTIC  2014     hsg  11    blockage of both tubes     INSERT INTRAUTERINE DEVICE N/A 2017    Procedure: INSERT INTRAUTERINE DEVICE;;  Surgeon: Samantha Sultana MD;  Location: [facility] OR     LAPAROSCOPIC GASTRIC SLEEVE  2012    Procedure: LAPAROSCOPIC GASTRIC SLEEVE;  Laparoscopic Sleeve Gastrectomy and Hiatal Hernia Repair;  Surgeon: Eze Muhammad MD;  [facility]     LAPAROSCOPIC SALPINGECTOMY  2013    Procedure: LAPAROSCOPIC SALPINGECTOMY;  Operative Laparoscopy, lysis of adhesions, Bilateral Salpingectomies ;  Surgeon: Abdelrahman Corcoran MD;  Location: [facility] OR     U/S GUIDANCE FOR TVOR (CLINIC ONLY)  10/2013     Family History   Problem Relation Age of Onset     Diabetes Maternal Grandmother      Hypertension Maternal Grandmother      Mental Illness Maternal Grandmother      Cerebrovascular Disease Maternal Grandmother      Allergies Father      Hypertension Father      Prostate Cancer Father      Mental Illness Father      Obesity Father      Anxiety Disorder Mother      Mental Illness  "Mother      Respiratory Brother      Obesity Brother      Social History     Socioeconomic History     Marital status:      Number of children: 1     Years of education: 18   Occupational History     Occupation: professional development     Employer: [employer]     Comment: middle school   Tobacco Use     Smoking status: Never     Smokeless tobacco: Never   Vaping Use     Vaping status: Never Used   Substance and Sexual Activity     Alcohol use: No     Comment: rarely     Drug use: No     Sexual activity: Not Currently     Partners: Male     Birth control/protection: I.U.D.     Comment: no fallopian tubes   Other Topics Concern     Parent/sibling w/ CABG, MI or angioplasty before 65F 55M? No       Current Outpatient Medications:      benzonatate (TESSALON) 200 MG capsule, Take 1 capsule (200 mg) by mouth 3 times daily as needed for cough, Disp: 20 capsule, Rfl: 1     busPIRone (BUSPAR) 10 MG tablet, Take 1 tablet (10 mg) by mouth 2 times daily, Disp: 180 tablet, Rfl: 3     Allergies   Allergen Reactions     No Known Drug Allergy        Past medical, surgical, social and family history were reviewed and updated in [facility].    EXAM:  /76   Pulse 84   Ht 1.575 m (5' 2\")   Wt 105.7 kg (233 lb)   SpO2 97%   BMI 42.62 kg/m     BMI: Body mass index is 42.62 kg/m .  Constitutional: healthy, alert and no distress  Head: Normocephalic. No masses, lesions, tenderness or abnormalities  Neck: Neck supple. Trachea midline. No adenopathy. Thyroid symmetric, normal size.   Cardiovascular: RRR.   Respiratory: Negative.   Breast: Breasts reveal mild symmetric fibrocystic densities, but there are no dominant, discrete, fixed or suspicious masses found.  Gastrointestinal: Abdomen soft, non-tender, non-distended. No masses, organomegaly.  :  Vulva:  No external lesions, normal female hair distribution, no inguinal adenopathy.    Urethra:  Midline, non-tender, well supported, no discharge  Vagina:  " Moist, pink, no abnormal discharge, no lesions  Uterus:  Normal size, IUD strings felt on exam , non-tender, freely mobile  Ovaries:  No masses appreciated, non-tender, mobile  Rectal Exam: deferred  Musculoskeletal: extremities normal  Skin: no suspicious lesions or rashes  Psychiatric: Affect appropriate, cooperative,mentation appears normal.     COUNSELING:   Reviewed preventive health counseling, as reflected in patient instructions   reports that she has never smoked. She has never used smokeless tobacco.    Body mass index is 42.62 kg/m .  Weight management plan: Losing weight  FRAX Risk Assessment    ASSESSMENT:  48 year old female with satisfactory annual exam  (Z01.419) Encounter for gynecological examination without abnormal finding  (primary encounter diagnosis)  Comment: Mirena 2017  Plan: Discussed good for 8 years and at that time, can check FSH level prior to removal.  Having minimal perimenopausal symptoms.    (Z13.0) Screening, anemia, deficiency, iron  Plan: CBC with platelets        Return to clinic for lab    (Z13.220) Screening for lipid disorders  Comment: Elevated prior  Plan: Lipid panel reflex to direct LDL Fasting        Return to clinic fasting for lab    (Z13.1) Screening for diabetes mellitus  Comment: Prediabetes  Plan: Comprehensive metabolic panel, Hemoglobin A1c        Return to clinic fasting for lab.  Discussed that she was supposed to follow-up with Pharm.D. after her last elevated A1c.  If this one is also elevated, strongly recommend follow-up    (Z13.29) Screening for thyroid disorder  Comment: Daughter  Plan: TSH with free T4 reflex        Return to clinic for lab    (F41.1) Generalized anxiety disorder  Comment: Stable  Plan: busPIRone (BUSPAR) 10 MG tablet        Refill was done      Samantha Sultana MD

## 2023-05-03 ENCOUNTER — LAB (OUTPATIENT)
Dept: LAB | Facility: CLINIC | Age: 48
End: 2023-05-03
Payer: COMMERCIAL

## 2023-05-03 ENCOUNTER — MYC MEDICAL ADVICE (OUTPATIENT)
Dept: OBGYN | Facility: CLINIC | Age: 48
End: 2023-05-03

## 2023-05-03 DIAGNOSIS — Z13.29 SCREENING FOR THYROID DISORDER: ICD-10-CM

## 2023-05-03 DIAGNOSIS — Z13.0 SCREENING, ANEMIA, DEFICIENCY, IRON: ICD-10-CM

## 2023-05-03 DIAGNOSIS — Z13.220 SCREENING FOR LIPID DISORDERS: ICD-10-CM

## 2023-05-03 DIAGNOSIS — Z13.1 SCREENING FOR DIABETES MELLITUS: ICD-10-CM

## 2023-05-03 LAB
ALBUMIN SERPL-MCNC: 3.5 G/DL (ref 3.4–5)
ALP SERPL-CCNC: 42 U/L (ref 40–150)
ALT SERPL W P-5'-P-CCNC: 19 U/L (ref 0–50)
ANION GAP SERPL CALCULATED.3IONS-SCNC: 3 MMOL/L (ref 3–14)
AST SERPL W P-5'-P-CCNC: 16 U/L (ref 0–45)
BILIRUB SERPL-MCNC: 0.6 MG/DL (ref 0.2–1.3)
BUN SERPL-MCNC: 7 MG/DL (ref 7–30)
CALCIUM SERPL-MCNC: 9.3 MG/DL (ref 8.5–10.1)
CHLORIDE BLD-SCNC: 105 MMOL/L (ref 94–109)
CHOLEST SERPL-MCNC: 192 MG/DL
CO2 SERPL-SCNC: 28 MMOL/L (ref 20–32)
CREAT SERPL-MCNC: 0.88 MG/DL (ref 0.52–1.04)
ERYTHROCYTE [DISTWIDTH] IN BLOOD BY AUTOMATED COUNT: 14.3 % (ref 10–15)
FASTING STATUS PATIENT QL REPORTED: YES
GFR SERPL CREATININE-BSD FRML MDRD: 81 ML/MIN/1.73M2
GLUCOSE BLD-MCNC: 96 MG/DL (ref 70–99)
HBA1C MFR BLD: 5.7 % (ref 0–5.6)
HCT VFR BLD AUTO: 41.9 % (ref 35–47)
HDLC SERPL-MCNC: 52 MG/DL
HGB BLD-MCNC: 14 G/DL (ref 11.7–15.7)
LDLC SERPL CALC-MCNC: 126 MG/DL
MCH RBC QN AUTO: 28.1 PG (ref 26.5–33)
MCHC RBC AUTO-ENTMCNC: 33.4 G/DL (ref 31.5–36.5)
MCV RBC AUTO: 84 FL (ref 78–100)
NONHDLC SERPL-MCNC: 140 MG/DL
PLATELET # BLD AUTO: 296 10E3/UL (ref 150–450)
POTASSIUM BLD-SCNC: 4.2 MMOL/L (ref 3.4–5.3)
PROT SERPL-MCNC: 7.8 G/DL (ref 6.8–8.8)
RBC # BLD AUTO: 4.98 10E6/UL (ref 3.8–5.2)
SODIUM SERPL-SCNC: 136 MMOL/L (ref 133–144)
TRIGL SERPL-MCNC: 68 MG/DL
TSH SERPL DL<=0.005 MIU/L-ACNC: 1.4 MU/L (ref 0.4–4)
WBC # BLD AUTO: 8.5 10E3/UL (ref 4–11)

## 2023-05-03 PROCEDURE — 84443 ASSAY THYROID STIM HORMONE: CPT

## 2023-05-03 PROCEDURE — 85027 COMPLETE CBC AUTOMATED: CPT

## 2023-05-03 PROCEDURE — 83036 HEMOGLOBIN GLYCOSYLATED A1C: CPT

## 2023-05-03 PROCEDURE — 80061 LIPID PANEL: CPT

## 2023-05-03 PROCEDURE — 36415 COLL VENOUS BLD VENIPUNCTURE: CPT

## 2023-05-03 PROCEDURE — 80053 COMPREHEN METABOLIC PANEL: CPT

## 2023-05-03 NOTE — TELEPHONE ENCOUNTER
Pt wants to know specific pharm D was discussed at annual at  clinic, knows BE out of town, will respond when back  Routing to provider to advise.  Estrella Bauer RN on 5/3/2023 at 1:56 PM

## 2023-05-16 ENCOUNTER — TELEPHONE (OUTPATIENT)
Dept: FAMILY MEDICINE | Facility: CLINIC | Age: 48
End: 2023-05-16
Payer: COMMERCIAL

## 2023-05-16 DIAGNOSIS — Z11.59 SCREENING FOR VIRAL DISEASE: Primary | ICD-10-CM

## 2023-05-16 NOTE — TELEPHONE ENCOUNTER
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 Patients [facility] does not have proof of MMR, HepB and Varicella received as a child in another state.     Should patient test for immunity or receive booster vaccines?     Tracey BRISCOE RN  [facility]

## 2023-05-17 NOTE — TELEPHONE ENCOUNTER
Nida sent to patient to advise them to schedule lab only appointment.    Marco Sanchez RN   [facility]

## 2023-05-17 NOTE — TELEPHONE ENCOUNTER
Patient prefers titers. Pt would like lab only today  Not sure exact MMR antibody test to run - please review    While PCP out of clinic today would another provider review/approve?    Orders pended    Thank you  Tracey BRISCOE RN  [facility]

## 2023-05-18 ENCOUNTER — LAB (OUTPATIENT)
Dept: LAB | Facility: CLINIC | Age: 48
End: 2023-05-18
Payer: COMMERCIAL

## 2023-05-18 DIAGNOSIS — Z11.59 SCREENING FOR VIRAL DISEASE: ICD-10-CM

## 2023-05-18 PROCEDURE — 36415 COLL VENOUS BLD VENIPUNCTURE: CPT

## 2023-05-18 PROCEDURE — 86762 RUBELLA ANTIBODY: CPT

## 2023-05-18 PROCEDURE — 86735 MUMPS ANTIBODY: CPT

## 2023-05-18 PROCEDURE — 86706 HEP B SURFACE ANTIBODY: CPT

## 2023-05-18 PROCEDURE — 86787 VARICELLA-ZOSTER ANTIBODY: CPT

## 2023-05-18 PROCEDURE — 86765 RUBEOLA ANTIBODY: CPT

## 2023-05-18 NOTE — TELEPHONE ENCOUNTER
Somehow this was out of my box but I remembered to check up on her. lol    It's Kellie Haque at [place] who is MTM pharm D and will meet with her, help get it lowered.    She should be able to just call and schedule with him    Thanks  Samantha Sultana MD

## 2023-05-20 LAB
HBV SURFACE AB SERPL IA-ACNC: >1000 M[IU]/ML
HBV SURFACE AB SERPL IA-ACNC: REACTIVE M[IU]/ML

## 2023-05-22 LAB
MEV IGG SER IA-ACNC: 73 AU/ML
MEV IGG SER IA-ACNC: POSITIVE
MUMPS ANTIBODY IGG INSTRUMENT VALUE: >300 AU/ML
MUV IGG SER QL IA: POSITIVE
RUBV IGG SERPL QL IA: 5.9 INDEX
RUBV IGG SERPL QL IA: POSITIVE
VZV IGG SER QL IA: 3388 INDEX
VZV IGG SER QL IA: POSITIVE

## 2023-06-01 ENCOUNTER — HEALTH MAINTENANCE LETTER (OUTPATIENT)
Age: 48
End: 2023-06-01

## 2023-10-28 DIAGNOSIS — F41.1 GENERALIZED ANXIETY DISORDER: ICD-10-CM

## 2023-10-30 RX ORDER — HYDROXYZINE HYDROCHLORIDE 25 MG/1
25 TABLET, FILM COATED ORAL AT BEDTIME
Qty: 90 TABLET | Refills: 1 | OUTPATIENT
Start: 2023-10-30

## 2024-06-08 ENCOUNTER — HEALTH MAINTENANCE LETTER (OUTPATIENT)
Age: 49
End: 2024-06-08

## 2025-03-03 ENCOUNTER — MYC MEDICAL ADVICE (OUTPATIENT)
Dept: FAMILY MEDICINE | Facility: CLINIC | Age: 50
End: 2025-03-03
Payer: COMMERCIAL

## 2025-03-03 ASSESSMENT — PATIENT HEALTH QUESTIONNAIRE - PHQ9
SUM OF ALL RESPONSES TO PHQ QUESTIONS 1-9: 4
10. IF YOU CHECKED OFF ANY PROBLEMS, HOW DIFFICULT HAVE THESE PROBLEMS MADE IT FOR YOU TO DO YOUR WORK, TAKE CARE OF THINGS AT HOME, OR GET ALONG WITH OTHER PEOPLE: NOT DIFFICULT AT ALL
SUM OF ALL RESPONSES TO PHQ QUESTIONS 1-9: 4

## 2025-03-03 NOTE — TELEPHONE ENCOUNTER
Pt requesting Rheumatology apt.   Orders pended,   Thanks,   Marito Quick RN  [facility]

## 2025-03-12 ENCOUNTER — OFFICE VISIT (OUTPATIENT)
Dept: FAMILY MEDICINE | Facility: CLINIC | Age: 50
End: 2025-03-12
Payer: COMMERCIAL

## 2025-03-12 ENCOUNTER — ANCILLARY PROCEDURE (OUTPATIENT)
Dept: MAMMOGRAPHY | Facility: CLINIC | Age: 50
End: 2025-03-12
Attending: FAMILY MEDICINE
Payer: COMMERCIAL

## 2025-03-12 VITALS
RESPIRATION RATE: 21 BRPM | TEMPERATURE: 97.3 F | BODY MASS INDEX: 46.01 KG/M2 | HEART RATE: 80 BPM | DIASTOLIC BLOOD PRESSURE: 87 MMHG | SYSTOLIC BLOOD PRESSURE: 128 MMHG | HEIGHT: 62 IN | WEIGHT: 250 LBS | OXYGEN SATURATION: 96 %

## 2025-03-12 DIAGNOSIS — M25.50 ARTHRALGIA OF MULTIPLE JOINTS: Primary | ICD-10-CM

## 2025-03-12 DIAGNOSIS — Z12.31 VISIT FOR SCREENING MAMMOGRAM: ICD-10-CM

## 2025-03-12 DIAGNOSIS — M79.602 PAIN IN BOTH UPPER EXTREMITIES: ICD-10-CM

## 2025-03-12 DIAGNOSIS — M79.601 PAIN IN BOTH UPPER EXTREMITIES: ICD-10-CM

## 2025-03-12 DIAGNOSIS — M17.10 ARTHRITIS OF KNEE: ICD-10-CM

## 2025-03-12 PROCEDURE — 90715 TDAP VACCINE 7 YRS/> IM: CPT | Performed by: FAMILY MEDICINE

## 2025-03-12 PROCEDURE — 3074F SYST BP LT 130 MM HG: CPT | Performed by: FAMILY MEDICINE

## 2025-03-12 PROCEDURE — 84550 ASSAY OF BLOOD/URIC ACID: CPT | Performed by: FAMILY MEDICINE

## 2025-03-12 PROCEDURE — 86140 C-REACTIVE PROTEIN: CPT | Performed by: FAMILY MEDICINE

## 2025-03-12 PROCEDURE — 99214 OFFICE O/P EST MOD 30 MIN: CPT | Mod: 25 | Performed by: FAMILY MEDICINE

## 2025-03-12 PROCEDURE — 3079F DIAST BP 80-89 MM HG: CPT | Performed by: FAMILY MEDICINE

## 2025-03-12 PROCEDURE — 36415 COLL VENOUS BLD VENIPUNCTURE: CPT | Performed by: FAMILY MEDICINE

## 2025-03-12 PROCEDURE — 77067 SCR MAMMO BI INCL CAD: CPT | Mod: TC | Performed by: RADIOLOGY

## 2025-03-12 PROCEDURE — 1125F AMNT PAIN NOTED PAIN PRSNT: CPT | Performed by: FAMILY MEDICINE

## 2025-03-12 PROCEDURE — 86431 RHEUMATOID FACTOR QUANT: CPT | Performed by: FAMILY MEDICINE

## 2025-03-12 PROCEDURE — 90471 IMMUNIZATION ADMIN: CPT | Performed by: FAMILY MEDICINE

## 2025-03-12 PROCEDURE — 86618 LYME DISEASE ANTIBODY: CPT | Performed by: FAMILY MEDICINE

## 2025-03-12 PROCEDURE — 77063 BREAST TOMOSYNTHESIS BI: CPT | Mod: TC | Performed by: RADIOLOGY

## 2025-03-12 RX ORDER — SERTRALINE HYDROCHLORIDE 100 MG/1
100 TABLET, FILM COATED ORAL DAILY
COMMUNITY
Start: 2025-03-11

## 2025-03-12 RX ORDER — FLUTICASONE PROPIONATE 50 MCG
1 SPRAY, SUSPENSION (ML) NASAL PRN
COMMUNITY
Start: 2025-01-17

## 2025-03-12 ASSESSMENT — PAIN SCALES - GENERAL: PAINLEVEL_OUTOF10: MODERATE PAIN (5)

## 2025-03-12 NOTE — PROGRESS NOTES
"  Assessment & Plan     Arthralgia of multiple joints  Worsening over the past few weeks   No fever, fall or injury   Worse in hands/ knees with some swelling  - CRP, inflammation; Future  - Uric acid; Future  - Anti Nuclear Damari IgG by IFA with Reflex; Future  - Rheumatoid factor; Future  - LYME DISEASE TOTAL ANTIBODIES WITH REFLEX TO CONFIRMATION; Future  - Adult Rheumatology  Referral; Future  - CRP, inflammation  - Uric acid  - Anti Nuclear Damari IgG by IFA with Reflex  - Rheumatoid factor  - LYME DISEASE TOTAL ANTIBODIES WITH REFLEX TO CONFIRMATION    Arthritis of knee  Try ice/ daily walks  - CRP, inflammation; Future  - Anti Nuclear Damari IgG by IFA with Reflex; Future  - Rheumatoid factor; Future  - LYME DISEASE TOTAL ANTIBODIES WITH REFLEX TO CONFIRMATION; Future  - Adult Rheumatology  Referral; Future  - CRP, inflammation  - Anti Nuclear Damari IgG by IFA with Reflex  - Rheumatoid factor  - LYME DISEASE TOTAL ANTIBODIES WITH REFLEX TO CONFIRMATION    Pain in both upper extremities    - Adult Rheumatology  Referral; Future    Visit for screening mammogram  Needs to do   Aslo,sly see GYN for pap/ pelvic  - MA Screen Bilateral w/Deven; Future          BMI  Estimated body mass index is 45.73 kg/m  as calculated from the following:    Height as of this encounter: 1.575 m (5' 2\").    Weight as of this encounter: 113.4 kg (250 lb).   Weight management plan: Discussed healthy diet and exercise guidelines      Regular exercise  See Patient Instructions    Lakeshia Lipscomb is a 50 year old, presenting for the following health issues:  Arthritis (Bilateral knees and hands)      3/12/2025    11:02 AM   Additional Questions   Roomed by nasreen     Arthritis    History of Present Illness       Reason for visit:  Leg pain left calf knees hands joint pain  Symptom onset:  3-4 weeks ago   She is taking medications regularly.            Needs to see GYN/ get mamogram      Review of " Systems  Constitutional, HEENT, cardiovascular, pulmonary, gi and gu systems are negative, except as otherwise noted.      Objective    LMP 02/12/2025 (Approximate)   There is no height or weight on file to calculate BMI.  Physical Exam   GENERAL: alert, obese, and fatigued  EYES: Eyes grossly normal to inspection, PERRL and conjunctivae and sclerae normal  HENT: ear canals and TM's normal, nose and mouth without ulcers or lesions  NECK: no adenopathy, no asymmetry, masses, or scars  RESP: lungs clear to auscultation - no rales, rhonchi or wheezes  CV: regular rate and rhythm, normal S1 S2, no S3 or S4, no murmur, click or rub, no peripheral edema  ABDOMEN: soft, nontender, no hepatosplenomegaly, no masses and bowel sounds normal  MS: normal muscle tone, arthritis of the knees, and tenderness to palpation both first / 2nd MP joints  SKIN: no suspicious lesions or rashes  NEURO: Normal strength and tone, mentation intact and speech normal  PSYCH: mentation appears normal, affect normal/bright  Diabetic foot exam: normal DP and PT pulses, no trophic changes or ulcerative lesions, and normal sensory exam    Lab on 05/18/2023   Component Date Value Ref Range Status    Hepatitis B Surface Antibody Instr* 05/18/2023 >1,000.00  <8.00 m[IU]/mL Final    Hepatitis B Surface Antibody 05/18/2023 Reactive   Final    Patient is considered to be immune to infection with hepatitis B when the value is greater than or equal to 12.00 mIU/mL.    VZV Damari IgG Instrument Value 05/18/2023 3,388.0  <135.0 Index Final    Varicella Zoster Antibody IgG 05/18/2023 Positive   Final    Suggests previous exposure or immunization and probable immunity.    Rubeola (Measles) Damari IgG Instrume* 05/18/2023 73.0  <13.5 AU/mL Final    Rubeola (Measles) Antibody IgG 05/18/2023 Positive   Final    Suggests previous exposure or immunization and probable immunity.    Rubella Damari IgG Instrument Value 05/18/2023 5.90  <0.90 Index Final    Rubella Antibody IgG  05/18/2023 Positive   Final    Suggests previous exposure or immunization and probable immunity.    Mumps Damari IgG Instrument Value 05/18/2023 >300.0  <9.0 AU/mL Final    Mumps Antibody IgG 05/18/2023 Positive   Final    Suggests previous exposure or immunization and probable immunity.     No results found for any visits on 03/12/25.        Signed Electronically by: Alejandro Gifford MD

## 2025-03-13 LAB
B BURGDOR IGG+IGM SER QL: 0.13
CRP SERPL-MCNC: 8.33 MG/L
RHEUMATOID FACT SERPL-ACNC: <10 IU/ML
URATE SERPL-MCNC: 5 MG/DL (ref 2.4–5.7)

## 2025-03-18 ENCOUNTER — TELEPHONE (OUTPATIENT)
Dept: FAMILY MEDICINE | Facility: CLINIC | Age: 50
End: 2025-03-18
Payer: COMMERCIAL

## 2025-03-18 DIAGNOSIS — M25.50 ARTHRALGIA OF MULTIPLE JOINTS: ICD-10-CM

## 2025-03-18 DIAGNOSIS — L93.0 LUPUS ERYTHEMATOSUS, UNSPECIFIED FORM: Primary | ICD-10-CM

## 2025-03-18 LAB
ANA PAT SER IF-IMP: ABNORMAL
ANA SER QL IF: POSITIVE
ANA TITR SER IF: ABNORMAL {TITER}

## 2025-03-18 NOTE — TELEPHONE ENCOUNTER
I called and reviewed labs   positive LUDMILA   Will see rheum  Orders Placed This Encounter    Adult Rheumatology  Referral     Standing Status:   Future     Standing Expiration Date:   3/18/2026     Referral Priority:   Priority: 1-2 Weeks     Referral Type:   Consultation     Requested Specialty:   Rheumatology     Number of Visits Requested:   4

## 2025-03-20 ENCOUNTER — TELEPHONE (OUTPATIENT)
Dept: ENDOCRINOLOGY | Facility: CLINIC | Age: 50
End: 2025-03-20
Payer: COMMERCIAL

## 2025-03-20 NOTE — TELEPHONE ENCOUNTER
Left Voicemail (1st Attempt) and Sent Mychart (1st Attempt) for the patient to call back and schedule the following:    Appointment type: [place] [facility] [place] (60 min)  Appointment mode: In Person or Virtual Visit  Provider: Marcella Bro NP, Cee Padgett PA-C, or Teri Ramirez PA-C  Return date: Approx. Next available, nbs OK per [place]  Specialty phone number: direct line  and [phone]     Additional Notes:   Reschedule 4/3/25 visit with Federica Ruiz with another provider.

## 2025-03-24 ASSESSMENT — SLEEP AND FATIGUE QUESTIONNAIRES
HOW LIKELY ARE YOU TO NOD OFF OR FALL ASLEEP WHEN YOU ARE A PASSENGER IN A CAR FOR AN HOUR WITHOUT A BREAK: SLIGHT CHANCE OF DOZING
HOW LIKELY ARE YOU TO NOD OFF OR FALL ASLEEP WHILE SITTING QUIETLY AFTER LUNCH WITHOUT ALCOHOL: WOULD NEVER DOZE
HOW LIKELY ARE YOU TO NOD OFF OR FALL ASLEEP IN A CAR, WHILE STOPPED FOR A FEW MINUTES IN TRAFFIC: WOULD NEVER DOZE
HOW LIKELY ARE YOU TO NOD OFF OR FALL ASLEEP WHILE LYING DOWN TO REST IN THE AFTERNOON WHEN CIRCUMSTANCES PERMIT: MODERATE CHANCE OF DOZING
HOW LIKELY ARE YOU TO NOD OFF OR FALL ASLEEP WHILE WATCHING TV: MODERATE CHANCE OF DOZING
HOW LIKELY ARE YOU TO NOD OFF OR FALL ASLEEP WHILE SITTING INACTIVE IN A PUBLIC PLACE: SLIGHT CHANCE OF DOZING
HOW LIKELY ARE YOU TO NOD OFF OR FALL ASLEEP WHILE SITTING AND READING: MODERATE CHANCE OF DOZING
HOW LIKELY ARE YOU TO NOD OFF OR FALL ASLEEP WHILE SITTING AND TALKING TO SOMEONE: WOULD NEVER DOZE

## 2025-03-25 ENCOUNTER — VIRTUAL VISIT (OUTPATIENT)
Dept: ENDOCRINOLOGY | Facility: CLINIC | Age: 50
End: 2025-03-25
Payer: COMMERCIAL

## 2025-03-25 ENCOUNTER — TELEPHONE (OUTPATIENT)
Dept: ENDOCRINOLOGY | Facility: CLINIC | Age: 50
End: 2025-03-25

## 2025-03-25 VITALS — WEIGHT: 250 LBS | HEIGHT: 62 IN | BODY MASS INDEX: 46.01 KG/M2

## 2025-03-25 DIAGNOSIS — E66.01 CLASS 3 SEVERE OBESITY WITH SERIOUS COMORBIDITY AND BODY MASS INDEX (BMI) OF 45.0 TO 49.9 IN ADULT, UNSPECIFIED OBESITY TYPE (H): Primary | ICD-10-CM

## 2025-03-25 DIAGNOSIS — Z98.84 S/P LAPAROSCOPIC SLEEVE GASTRECTOMY: ICD-10-CM

## 2025-03-25 DIAGNOSIS — E66.813 CLASS 3 SEVERE OBESITY WITH SERIOUS COMORBIDITY AND BODY MASS INDEX (BMI) OF 45.0 TO 49.9 IN ADULT, UNSPECIFIED OBESITY TYPE (H): Primary | ICD-10-CM

## 2025-03-25 PROCEDURE — 1125F AMNT PAIN NOTED PAIN PRSNT: CPT | Mod: 95 | Performed by: NURSE PRACTITIONER

## 2025-03-25 PROCEDURE — 98007 SYNCH AUDIO-VIDEO EST HI 40: CPT | Performed by: NURSE PRACTITIONER

## 2025-03-25 ASSESSMENT — PAIN SCALES - GENERAL: PAINLEVEL_OUTOF10: SEVERE PAIN (7)

## 2025-03-25 NOTE — PATIENT INSTRUCTIONS
Start zepbound 2.5mg x 4 weeks then increase to 5mg   Hydration 64oz daily   Protein with each eating episode   3 eating episodes daily   Consider seeing dietitian   Follow up with me in 3 months   Update labs when able

## 2025-03-25 NOTE — TELEPHONE ENCOUNTER
Prior Authorization Approval    Medication: ZEPBOUND 2.5 MG/0.5ML SC SOAJ  Authorization Effective Date: 2/23/2025  Authorization Expiration Date: 11/20/2025  Approved Dose/Quantity: uud  Reference #: Key: [id number]   Insurance Company: [employer]'s - Phone [phone] Fax [phone]  Expected CoPay: $25.00    CoPay Card Available:      Financial Assistance Needed:    Which Pharmacy is filling the prescription:    Pharmacy Notified: Yes    Key: [id number]

## 2025-03-25 NOTE — LETTER
3/25/2025       RE: Deisi Miner  [id number]  [facility] [address]     Dear Colleague,    Thank you for referring your patient, Deisi Miner, to the [facility] [place] at [facility]. Please see a copy of my visit note below.    Return Bariatric Surgery Note    RE: Deisi Miner  MR#: [id number]  : 1975  VISIT DATE: Mar 25, 2025      Dear Alejandro Gifford,    I had the pleasure of seeing your patient, Deisi Miner, in my post-bariatric surgery assessment clinic.    Assessment & Plan  Problem List Items Addressed This Visit          Digestive    Class 3 severe obesity with serious comorbidity and body mass index (BMI) of 45.0 to 49.9 in adult (H) - Primary     Since last seen in  has continued to try to lose weight without success. Qsymia which we tried in  caused insomnia so she stopped. Now, she has new insurance and would like to see if Glp1 covered. She believes her partner has been getting zepbound approved with same insurance.     Describes difficulty feeling satisfied even if feeling full. Recently, with work stress, has been missing lunch as she often has meetings through that time. Otherwise, tries to prioritize protein and hydration. Knows she needs to find a way to prioritize herself through her job responsibilities. Discussed strategies to get at least protein shake and hydration during work.     Has rheumatology consult scheduled for generalized joint pain and positive LUDMILA. Has also had falls this year. Could benefit from PT in the future. Also going to follow up with her primary care provider to discuss perimenopause.     Due for bariatric labs. No heart burn. No dysphagia. Rare constipation.     Start zepbound 2.5mg x 4 weeks then increase to 5mg   Hydration 64oz daily   Protein with each eating episode   3 eating episodes daily   Consider seeing dietitian   Follow up with me in 3  months   Update labs when able          Relevant Medications    tirzepatide-Weight Management (ZEPBOUND) 2.5 MG/0.5ML prefilled pen    tirzepatide-Weight Management (ZEPBOUND) 5 MG/0.5ML prefilled pen    Other Relevant Orders    CBC with platelets    Comprehensive metabolic panel    Ferritin    Hemoglobin A1c    Parathyroid Hormone Intact    Vitamin A    Vitamin D Deficiency    Vitamin B12       Other    S/P laparoscopic sleeve gastrectomy    Relevant Medications    tirzepatide-Weight Management (ZEPBOUND) 2.5 MG/0.5ML prefilled pen    tirzepatide-Weight Management (ZEPBOUND) 5 MG/0.5ML prefilled pen    Other Relevant Orders    CBC with platelets    Comprehensive metabolic panel    Ferritin    Hemoglobin A1c    Parathyroid Hormone Intact    Vitamin A    Vitamin D Deficiency    Vitamin B12          50 minutes spent by me on the date of the encounter doing chart review, history and exam, documentation and further activities per the note    The longitudinal plan of care for the diagnosis(es)/condition(s) as documented were addressed during this visit. Due to the added complexity in care, I will continue to support Deisi in the subsequent management and with ongoing continuity of care.    CHIEF COMPLAINT: Post-bariatric surgery follow-up. S/p sleeve and HH repair 5/2012 with Dr. Muhammad. High weight 244lb(BMI44). Low weight 7/2013 was 158lb. Pregnancy with twins (one passed) 2 years postop with gain to 228lb (seen by [facility] 2015). Lost to follow up. Initial visit 8/12/2021- post bariatric surgery weight gain, s/p sleeve (BMI 44, regain of all weight).  Wanted to try GLP1 but not covered by insurance. Patient hesitant about metformin due to not being able to get away to bathroom frequently enough. Started qsymia. Last seen 10/2021     Comorbidities HLD, prediabetes, daytime fatigue (naps on weekends), pain with +shannon     HISTORY OF PRESENT ILLNESS:      3/24/2025    11:34 AM   Questions Regarding Prior Weight  Loss Surgery Reviewed With Patient   I had the following weight loss procedure Sleeve Gastrectomy   What year was your surgery? 2013   How has your weight changed since your last visit? I have gained weight   Do you currently have any of the following None of the above   Do you have any concerns today? Need to try iweight loss medication       Anti-obesity medications:     Current:   None     Failed/contraindicated:   Qsymia- insomnia   Topiramate- tingling     Recent diet changes:   Tolerates larger portions over time  Some foods make her not feel well- shrimp, pasta, rice   Vomiting only when over eating or eating too quickly   Doesn't feel satisfied with small portions but gets too full   Recently missing lunch at work - works through     -Protein- protein first at meals,   -Water- drinks ice water through the day in water bottle     Recent exercise/activity changes:   Generalized joint pain - positive LUDMILA - has appointment scheduled with rheumatologist   Gets random shooting pain in leg  Has had falls in the last year     Recent stressors:   Ongoing anxiety management - well controlled right now   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 250   What is your lowest weight since surgery? (In pounds) 175     Initial Weight (lbs): 242 lbs  Weight: 113.4 kg (250 lb)  Last Visits Weight: 105.2 kg (232 lb)  Cumulative weight loss (lbs): -8  Weight Loss Percentage: -3.31%        3/24/2025    11:34 AM   Questions Regarding Co-Morbidities and Health Concerns Reviewed With Patient   Pre-diabetes Stayed the same   Diabetes II Never   High Blood Pressure Never   High cholesterol Improved   Heartburn/Reflux Never   Sleep apnea Never   PCOS Never   Back pain Improved   Joint pain Improved    Lower leg swelling Improved           3/24/2025    11:34 AM   Eating Habits   How many meals do you eat per day? 2   Do you snack between meals? Sometimes   How much food are you eating at each meal? Greater than 1 cup   Are you able to separate your meals and liquids by at least 30 minutes? Yes   Are you able to avoid liquid calories? Sometimes           3/24/2025    11:34 AM   Exercise Questions Reviewed With Patient   How often do you exercise? 1 to 2 times per week   What is the duration of your exercise (in minutes)? 30 Minutes   What types of exercise do you do? walking   What keeps you from being more active? Pain    Too tired       Social History:      3/24/2025    11:34 AM   --   Are you smoking? No   Are you drinking alcohol? No       Medications:  Current Outpatient Medications   Medication Sig Dispense Refill     tirzepatide-Weight Management (ZEPBOUND) 2.5 MG/0.5ML prefilled pen Inject 0.5 mLs (2.5 mg) subcutaneously every 7 days. 2 mL 0     tirzepatide-Weight Management (ZEPBOUND) 5 MG/0.5ML prefilled pen Inject 0.5 mLs (5 mg) subcutaneously every 7 days. 2 mL 2     busPIRone (BUSPAR) 10 MG tablet Take 1 tablet (10 mg) by mouth 2 times daily 180 tablet 3     hydrOXYzine (ATARAX) 25 MG tablet Take 1 tablet (25 mg) by mouth At Bedtime 90 tablet 1     sertraline (ZOLOFT) 100 MG tablet Take 100 mg by mouth daily.       No current facility-administered medications for this visit.         3/24/2025    11:34 AM   --   Do you avoid NSAIDs such as (Ibuprofen, Aleve, Naproxen, Advil)? Yes       ROS:  GI:       3/24/2025    11:34 AM   --   Vomiting No   Diarrhea No   Constipation No   Swallowing trouble No   Abdominal pain No   Heartburn No     Skin:       3/24/2025    11:34 AM   BAR RBS ROS - SKIN   Rash in skin folds No     Psych:       3/24/2025    11:34 AM   --   Depression No   Anxiety Yes     Female Only:       3/24/2025    11:34 AM   BAR RBS ROS -    Female only Loss of menstrual cycles   Stress  "urinary incontinence No       Office Visit on 03/12/2025   Component Date Value Ref Range Status     CRP Inflammation 03/12/2025 8.33 (H)  <5.00 mg/L Final     Uric Acid 03/12/2025 5.0  2.4 - 5.7 mg/dL Final     LUDMILA interpretation 03/12/2025 Positive (A)  Negative Final      Negative:              <1:40  Borderline Positive:   1:40 - 1:80  Positive:              >1:80     LUDMILA pattern 1 03/12/2025 Speckled   Final     LUDMILA titer 1 03/12/2025 1:160   Final     Rheumatoid Factor 03/12/2025 <10  <14 IU/mL Final     Lyme Disease Antibodies Total 03/12/2025 0.13  <0.90 Final    Non-reactive, Absence of detectable Borrelia burgdorferi antibodies. A non-reactive result does not exclude the possibility of Borrelia burgdorferi infection. If early Lyme disease is suspected, a second sample should be collected and tested 2 to 4 weeks later.             3/26/2012    11:00 AM 3/24/2025    11:28 AM    Score (Last Two)    Raw Score 42 29    Activation Score 66 52.9     Level 3 2        Patient-reported         PHYSICAL EXAM:  Objective   Ht 1.575 m (5' 2\")   Wt 113.4 kg (250 lb)   LMP 02/12/2025 (Approximate)   BMI 45.73 kg/m    Vitals - Patient Reported  Pain Score: Severe Pain (7)  Pain Loc:  (joint)      Vitals:  No vitals were obtained today due to virtual visit.    Physical Exam   GENERAL: alert and no distress  EYES: Eyes grossly normal to inspection.  No discharge or erythema, or obvious scleral/conjunctival abnormalities.  RESP: No audible wheeze, cough, or visible cyanosis.    SKIN: Visible skin clear. No significant rash, abnormal pigmentation or lesions.  NEURO: Cranial nerves grossly intact.  Mentation and speech appropriate for age.  PSYCH: Appropriate affect, tone, and pace of words        Sincerely,    Marcella Bro NP      Virtual Visit Details    Type of service:  Video Visit     Originating Location (pt. Location): Home    Distant Location (provider location):  Off-site  Platform used for Video Visit: " Armida      Again, thank you for allowing me to participate in the care of your patient.      Sincerely,    Marcella Bro NP

## 2025-03-25 NOTE — PROGRESS NOTES
Virtual Visit Details    Type of service:  Video Visit     Originating Location (pt. Location): Home    Distant Location (provider location):  Off-site  Platform used for Video Visit: Armida

## 2025-03-25 NOTE — PROGRESS NOTES
Return Bariatric Surgery Note    RE: Deisi Miner  MR#: [id number]  : 1975  VISIT DATE: Mar 25, 2025      Dear Ирина, Alejandro Weathers,    I had the pleasure of seeing your patient, Deisi Miner, in my post-bariatric surgery assessment clinic.    Assessment & Plan   Problem List Items Addressed This Visit          Digestive    Class 3 severe obesity with serious comorbidity and body mass index (BMI) of 45.0 to 49.9 in adult (H) - Primary     Since last seen in  has continued to try to lose weight without success. Qsymia which we tried in  caused insomnia so she stopped. Now, she has new insurance and would like to see if Glp1 covered. She believes her partner has been getting zepbound approved with same insurance.     Describes difficulty feeling satisfied even if feeling full. Recently, with work stress, has been missing lunch as she often has meetings through that time. Otherwise, tries to prioritize protein and hydration. Knows she needs to find a way to prioritize herself through her job responsibilities. Discussed strategies to get at least protein shake and hydration during work.     Has rheumatology consult scheduled for generalized joint pain and positive LUDMILA. Has also had falls this year. Could benefit from PT in the future. Also going to follow up with her primary care provider to discuss perimenopause.     Due for bariatric labs. No heart burn. No dysphagia. Rare constipation.     Start zepbound 2.5mg x 4 weeks then increase to 5mg   Hydration 64oz daily   Protein with each eating episode   3 eating episodes daily   Consider seeing dietitian   Follow up with me in 3 months   Update labs when able          Relevant Medications    tirzepatide-Weight Management (ZEPBOUND) 2.5 MG/0.5ML prefilled pen    tirzepatide-Weight Management (ZEPBOUND) 5 MG/0.5ML prefilled pen    Other Relevant Orders    CBC with platelets    Comprehensive metabolic panel    Ferritin    Hemoglobin A1c     Parathyroid Hormone Intact    Vitamin A    Vitamin D Deficiency    Vitamin B12       Other    S/P laparoscopic sleeve gastrectomy    Relevant Medications    tirzepatide-Weight Management (ZEPBOUND) 2.5 MG/0.5ML prefilled pen    tirzepatide-Weight Management (ZEPBOUND) 5 MG/0.5ML prefilled pen    Other Relevant Orders    CBC with platelets    Comprehensive metabolic panel    Ferritin    Hemoglobin A1c    Parathyroid Hormone Intact    Vitamin A    Vitamin D Deficiency    Vitamin B12          50 minutes spent by me on the date of the encounter doing chart review, history and exam, documentation and further activities per the note    The longitudinal plan of care for the diagnosis(es)/condition(s) as documented were addressed during this visit. Due to the added complexity in care, I will continue to support Deisi in the subsequent management and with ongoing continuity of care.    CHIEF COMPLAINT: Post-bariatric surgery follow-up. S/p sleeve and HH repair 5/2012 with Dr. Muhammad. High weight 244lb(BMI44). Low weight 7/2013 was 158lb. Pregnancy with twins (one passed) 2 years postop with gain to 228lb (seen by [facility] 2015). Lost to follow up. Initial visit 8/12/2021- post bariatric surgery weight gain, s/p sleeve (BMI 44, regain of all weight).  Wanted to try GLP1 but not covered by insurance. Patient hesitant about metformin due to not being able to get away to bathroom frequently enough. Started qsymia. Last seen 10/2021     Comorbidities HLD, prediabetes, daytime fatigue (naps on weekends), pain with +shannon     HISTORY OF PRESENT ILLNESS:      3/24/2025    11:34 AM   Questions Regarding Prior Weight Loss Surgery Reviewed With Patient   I had the following weight loss procedure Sleeve Gastrectomy   What year was your surgery? 2013   How has your weight changed since your last visit? I have gained weight   Do you currently have any of the following None of the above   Do you have any concerns today? Need to try  iweight loss medication       Anti-obesity medications:     Current:   None     Failed/contraindicated:   Qsymia- insomnia   Topiramate- tingling     Recent diet changes:   Tolerates larger portions over time  Some foods make her not feel well- shrimp, pasta, rice   Vomiting only when over eating or eating too quickly   Doesn't feel satisfied with small portions but gets too full   Recently missing lunch at work - works through     -Protein- protein first at meals,   -Water- drinks ice water through the day in water bottle     Recent exercise/activity changes:   Generalized joint pain - positive LUDMILA - has appointment scheduled with rheumatologist   Gets random shooting pain in leg  Has had falls in the last year     Recent stressors:   Ongoing anxiety management - well controlled right now   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 250   What is your lowest weight since surgery? (In pounds) 175     Initial Weight (lbs): 242 lbs  Weight: 113.4 kg (250 lb)  Last Visits Weight: 105.2 kg (232 lb)  Cumulative weight loss (lbs): -8  Weight Loss Percentage: -3.31%        3/24/2025    11:34 AM   Questions Regarding Co-Morbidities and Health Concerns Reviewed With Patient   Pre-diabetes Stayed the same   Diabetes II Never   High Blood Pressure Never   High cholesterol Improved   Heartburn/Reflux Never   Sleep apnea Never   PCOS Never   Back pain Improved   Joint pain Improved   Lower leg swelling Improved           3/24/2025    11:34 AM   Eating Habits   How many meals do you eat per day? 2   Do you snack between meals? Sometimes   How much food are you eating at each meal? Greater than 1 cup   Are you able to separate your meals and liquids by at least 30 minutes? Yes   Are you able to avoid  liquid calories? Sometimes           3/24/2025    11:34 AM   Exercise Questions Reviewed With Patient   How often do you exercise? 1 to 2 times per week   What is the duration of your exercise (in minutes)? 30 Minutes   What types of exercise do you do? walking   What keeps you from being more active? Pain    Too tired       Social History:      3/24/2025    11:34 AM   --   Are you smoking? No   Are you drinking alcohol? No       Medications:  Current Outpatient Medications   Medication Sig Dispense Refill    tirzepatide-Weight Management (ZEPBOUND) 2.5 MG/0.5ML prefilled pen Inject 0.5 mLs (2.5 mg) subcutaneously every 7 days. 2 mL 0    tirzepatide-Weight Management (ZEPBOUND) 5 MG/0.5ML prefilled pen Inject 0.5 mLs (5 mg) subcutaneously every 7 days. 2 mL 2    busPIRone (BUSPAR) 10 MG tablet Take 1 tablet (10 mg) by mouth 2 times daily 180 tablet 3    hydrOXYzine (ATARAX) 25 MG tablet Take 1 tablet (25 mg) by mouth At Bedtime 90 tablet 1    sertraline (ZOLOFT) 100 MG tablet Take 100 mg by mouth daily.       No current facility-administered medications for this visit.         3/24/2025    11:34 AM   --   Do you avoid NSAIDs such as (Ibuprofen, Aleve, Naproxen, Advil)? Yes       ROS:  GI:       3/24/2025    11:34 AM   --   Vomiting No   Diarrhea No   Constipation No   Swallowing trouble No   Abdominal pain No   Heartburn No     Skin:       3/24/2025    11:34 AM   BAR RBS ROS - SKIN   Rash in skin folds No     Psych:       3/24/2025    11:34 AM   --   Depression No   Anxiety Yes     Female Only:       3/24/2025    11:34 AM   BAR RBS ROS -    Female only Loss of menstrual cycles   Stress urinary incontinence No       Office Visit on 03/12/2025   Component Date Value Ref Range Status    CRP Inflammation 03/12/2025 8.33 (H)  <5.00 mg/L Final    Uric Acid 03/12/2025 5.0  2.4 - 5.7 mg/dL Final    LUDMILA interpretation 03/12/2025 Positive (A)  Negative Final      Negative:              <1:40  Borderline Positive:   1:40 -  "1:80  Positive:              >1:80    LUDMILA pattern 1 03/12/2025 Speckled   Final    LUDMILA titer 1 03/12/2025 1:160   Final    Rheumatoid Factor 03/12/2025 <10  <14 IU/mL Final    Lyme Disease Antibodies Total 03/12/2025 0.13  <0.90 Final    Non-reactive, Absence of detectable Borrelia burgdorferi antibodies. A non-reactive result does not exclude the possibility of Borrelia burgdorferi infection. If early Lyme disease is suspected, a second sample should be collected and tested 2 to 4 weeks later.             3/26/2012    11:00 AM 3/24/2025    11:28 AM    Score (Last Two)    Raw Score 42 29    Activation Score 66 52.9     Level 3 2        Patient-reported         PHYSICAL EXAM:  Objective    Ht 1.575 m (5' 2\")   Wt 113.4 kg (250 lb)   LMP 02/12/2025 (Approximate)   BMI 45.73 kg/m    Vitals - Patient Reported  Pain Score: Severe Pain (7)  Pain Loc:  (joint)      Vitals:  No vitals were obtained today due to virtual visit.    Physical Exam   GENERAL: alert and no distress  EYES: Eyes grossly normal to inspection.  No discharge or erythema, or obvious scleral/conjunctival abnormalities.  RESP: No audible wheeze, cough, or visible cyanosis.    SKIN: Visible skin clear. No significant rash, abnormal pigmentation or lesions.  NEURO: Cranial nerves grossly intact.  Mentation and speech appropriate for age.  PSYCH: Appropriate affect, tone, and pace of words        Sincerely,    Marcella Bro NP    "

## 2025-03-25 NOTE — ASSESSMENT & PLAN NOTE
Since last seen in 2021 has continued to try to lose weight without success. Qsymia which we tried in 2021 caused insomnia so she stopped. Now, she has new insurance and would like to see if Glp1 covered. She believes her partner has been getting zepbound approved with same insurance.     Describes difficulty feeling satisfied even if feeling full. Recently, with work stress, has been missing lunch as she often has meetings through that time. Otherwise, tries to prioritize protein and hydration. Knows she needs to find a way to prioritize herself through her job responsibilities. Discussed strategies to get at least protein shake and hydration during work.     Has rheumatology consult scheduled for generalized joint pain and positive LUDMILA. Has also had falls this year. Could benefit from PT in the future. Also going to follow up with her primary care provider to discuss perimenopause.     Due for bariatric labs. No heart burn. No dysphagia. Rare constipation.     Start zepbound 2.5mg x 4 weeks then increase to 5mg   Hydration 64oz daily   Protein with each eating episode   3 eating episodes daily   Consider seeing dietitian   Follow up with me in 3 months   Update labs when able

## 2025-03-26 ENCOUNTER — HOSPITAL ENCOUNTER (OUTPATIENT)
Dept: ULTRASOUND IMAGING | Facility: CLINIC | Age: 50
Discharge: HOME | End: 2025-03-26
Attending: FAMILY MEDICINE
Payer: COMMERCIAL

## 2025-03-26 ENCOUNTER — OFFICE VISIT (OUTPATIENT)
Dept: FAMILY MEDICINE | Facility: CLINIC | Age: 50
End: 2025-03-26
Payer: COMMERCIAL

## 2025-03-26 ENCOUNTER — LAB (OUTPATIENT)
Dept: LAB | Facility: CLINIC | Age: 50
End: 2025-03-26
Payer: COMMERCIAL

## 2025-03-26 VITALS
SYSTOLIC BLOOD PRESSURE: 122 MMHG | WEIGHT: 251.5 LBS | OXYGEN SATURATION: 96 % | DIASTOLIC BLOOD PRESSURE: 83 MMHG | TEMPERATURE: 97.1 F | RESPIRATION RATE: 27 BRPM | HEIGHT: 62 IN | BODY MASS INDEX: 46.28 KG/M2 | HEART RATE: 88 BPM

## 2025-03-26 DIAGNOSIS — E66.01 CLASS 3 SEVERE OBESITY WITH SERIOUS COMORBIDITY AND BODY MASS INDEX (BMI) OF 45.0 TO 49.9 IN ADULT, UNSPECIFIED OBESITY TYPE (H): ICD-10-CM

## 2025-03-26 DIAGNOSIS — R53.83 FATIGUE, UNSPECIFIED TYPE: ICD-10-CM

## 2025-03-26 DIAGNOSIS — E66.813 CLASS 3 SEVERE OBESITY WITH SERIOUS COMORBIDITY AND BODY MASS INDEX (BMI) OF 45.0 TO 49.9 IN ADULT, UNSPECIFIED OBESITY TYPE (H): ICD-10-CM

## 2025-03-26 DIAGNOSIS — Z98.84 S/P LAPAROSCOPIC SLEEVE GASTRECTOMY: ICD-10-CM

## 2025-03-26 DIAGNOSIS — R42 DIZZINESS: ICD-10-CM

## 2025-03-26 DIAGNOSIS — M79.662 PAIN OF LEFT CALF: ICD-10-CM

## 2025-03-26 DIAGNOSIS — H81.10 BENIGN PAROXYSMAL POSITIONAL VERTIGO, UNSPECIFIED LATERALITY: ICD-10-CM

## 2025-03-26 DIAGNOSIS — R42 DIZZINESS: Primary | ICD-10-CM

## 2025-03-26 LAB
ALBUMIN SERPL BCG-MCNC: 4.2 G/DL (ref 3.5–5.2)
ALP SERPL-CCNC: 49 U/L (ref 40–150)
ALT SERPL W P-5'-P-CCNC: 20 U/L (ref 0–50)
ANION GAP SERPL CALCULATED.3IONS-SCNC: 13 MMOL/L (ref 7–15)
AST SERPL W P-5'-P-CCNC: 24 U/L (ref 0–45)
BILIRUB SERPL-MCNC: 0.3 MG/DL
BUN SERPL-MCNC: 8.1 MG/DL (ref 6–20)
CALCIUM SERPL-MCNC: 9.3 MG/DL (ref 8.8–10.4)
CHLORIDE SERPL-SCNC: 98 MMOL/L (ref 98–107)
CREAT SERPL-MCNC: 0.85 MG/DL (ref 0.51–0.95)
EGFRCR SERPLBLD CKD-EPI 2021: 84 ML/MIN/1.73M2
ERYTHROCYTE [DISTWIDTH] IN BLOOD BY AUTOMATED COUNT: 13.9 % (ref 10–15)
EST. AVERAGE GLUCOSE BLD GHB EST-MCNC: 117 MG/DL
FERRITIN SERPL-MCNC: 149 NG/ML (ref 6–175)
GLUCOSE SERPL-MCNC: 91 MG/DL (ref 70–99)
HBA1C MFR BLD: 5.7 % (ref 0–5.6)
HCO3 SERPL-SCNC: 24 MMOL/L (ref 22–29)
HCT VFR BLD AUTO: 43.2 % (ref 35–47)
HGB BLD-MCNC: 13.9 G/DL (ref 11.7–15.7)
MCH RBC QN AUTO: 26.9 PG (ref 26.5–33)
MCHC RBC AUTO-ENTMCNC: 32.2 G/DL (ref 31.5–36.5)
MCV RBC AUTO: 84 FL (ref 78–100)
PLATELET # BLD AUTO: 294 10E3/UL (ref 150–450)
POTASSIUM SERPL-SCNC: 4.4 MMOL/L (ref 3.4–5.3)
PROT SERPL-MCNC: 7.4 G/DL (ref 6.4–8.3)
PTH-INTACT SERPL-MCNC: 41 PG/ML (ref 15–65)
RBC # BLD AUTO: 5.16 10E6/UL (ref 3.8–5.2)
SODIUM SERPL-SCNC: 135 MMOL/L (ref 135–145)
TSH SERPL DL<=0.005 MIU/L-ACNC: 1.48 UIU/ML (ref 0.3–4.2)
VIT B12 SERPL-MCNC: 735 PG/ML (ref 232–1245)
VIT D+METAB SERPL-MCNC: 34 NG/ML (ref 20–50)
WBC # BLD AUTO: 8.9 10E3/UL (ref 4–11)

## 2025-03-26 PROCEDURE — 36415 COLL VENOUS BLD VENIPUNCTURE: CPT

## 2025-03-26 PROCEDURE — 82306 VITAMIN D 25 HYDROXY: CPT

## 2025-03-26 PROCEDURE — 83970 ASSAY OF PARATHORMONE: CPT

## 2025-03-26 PROCEDURE — 93000 ELECTROCARDIOGRAM COMPLETE: CPT | Performed by: FAMILY MEDICINE

## 2025-03-26 PROCEDURE — 83036 HEMOGLOBIN GLYCOSYLATED A1C: CPT

## 2025-03-26 PROCEDURE — 99000 SPECIMEN HANDLING OFFICE-LAB: CPT

## 2025-03-26 PROCEDURE — 3079F DIAST BP 80-89 MM HG: CPT | Performed by: FAMILY MEDICINE

## 2025-03-26 PROCEDURE — 84443 ASSAY THYROID STIM HORMONE: CPT

## 2025-03-26 PROCEDURE — 84590 ASSAY OF VITAMIN A: CPT | Mod: 90

## 2025-03-26 PROCEDURE — 80053 COMPREHEN METABOLIC PANEL: CPT

## 2025-03-26 PROCEDURE — 85027 COMPLETE CBC AUTOMATED: CPT

## 2025-03-26 PROCEDURE — 82728 ASSAY OF FERRITIN: CPT

## 2025-03-26 PROCEDURE — 99213 OFFICE O/P EST LOW 20 MIN: CPT | Performed by: FAMILY MEDICINE

## 2025-03-26 PROCEDURE — 82607 VITAMIN B-12: CPT

## 2025-03-26 PROCEDURE — 93971 EXTREMITY STUDY: CPT | Mod: LT

## 2025-03-26 PROCEDURE — 3074F SYST BP LT 130 MM HG: CPT | Performed by: FAMILY MEDICINE

## 2025-03-26 NOTE — PROGRESS NOTES
Assessment & Plan     Dizziness  Worse with position changes   No tinnitus , hearing gloss or trauma   normal neuro exam    - TSH with free T4 reflex; Future  - EKG 12-lead complete w/read - Clinics  - Physical Therapy  Referral; Future    Fatigue, unspecified type  Starting new Med  Follow up with consultant as planned.   - EKG 12-lead complete w/read - Clinics    Benign paroxysmal positional vertigo, unspecified laterality  Offered her meclizine, she declined  - Physical Therapy  Referral; Future    Pain of left calf  Worse at times with certain movements  Likely musculoskeletal    normal exam except mildly tender gastrocnemius  Calf same diameter  - US Lower Extremity Venous Duplex Left; Future            See Patient Instructions    Subjective   Deisi is a 50 year old, presenting for the following health issues:  Dizziness      3/26/2025    11:20 AM   Additional Questions   Roomed by roro ODELL   Accompanied by self     History of Present Illness       Reason for visit:  Not feeling well, some dizziness    She eats 2-3 servings of fruits and vegetables daily.She consumes 2 sweetened beverage(s) daily.She exercises with enough effort to increase her heart rate 30 to 60 minutes per day.  She exercises with enough effort to increase her heart rate 3 or less days per week.   She is taking medications regularly.          Dizziness  Onset/Duration: days  Description:   Do you feel faint: No  Does it feel like the surroundings (bed, room) are moving: YES  Unsteady/off balance: for brief times  Have you passed out or fallen: No  Intensity: mild  Progression of Symptoms: waxing and waning  Accompanying Signs & Symptoms:  Heart palpitations or chest pain: No  Nausea, vomiting: No  Weakness or lack of coordination in arms or legs: No  Vision or speech changes: No  Numbness or tingling: No  Ringing in ears (Tinnitus): No  Hearing Loss: No  History:   Head trauma/concussion history: No  Previous similar  "symptoms: YES  Recent bleeding history: No  Any new medications (BP?): No  Precipitating factors:   Worse with activity: YES  Worse with head movement: YES  Alleviating factors:   Does staying in a fixed position give relief: YES  Therapies tried and outcome: None    Concern - calf ( left ) sore  Onset: days  Description: midl ache at times  Intensity: mild  Progression of Symptoms:  waxing and waning  Accompanying Signs & Symptoms: fatigue  Previous history of similar problem: no  Precipitating factors:        Worsened by: certain movements  Alleviating factors:        Improved by: rest  Therapies tried and outcome:  none         Review of Systems  Constitutional, HEENT, cardiovascular, pulmonary, gi and gu systems are negative, except as otherwise noted.      Objective    /83 (BP Location: Left arm, Patient Position: Sitting, Cuff Size: Adult Regular)   Pulse 88   Temp 97.1  F (36.2  C) (Temporal)   Resp 27   Ht 1.575 m (5' 2\")   Wt 114.1 kg (251 lb 8 oz)   LMP 02/12/2025 (Approximate)   SpO2 96%   BMI 46.00 kg/m    Body mass index is 46 kg/m .  Physical Exam   GENERAL: alert and no distress  EYES: Eyes grossly normal to inspection, PERRL and conjunctivae and sclerae normal  HENT: ear canals and TM's normal, nose and mouth without ulcers or lesions  NECK: no adenopathy, no asymmetry, masses, or scars  RESP: lungs clear to auscultation - no rales, rhonchi or wheezes  CV: regular rate and rhythm, normal S1 S2, no S3 or S4, no murmur, click or rub, no peripheral edema  ABDOMEN: soft, nontender, no hepatosplenomegaly, no masses and bowel sounds normal  MS: normal muscle tone and tenderness to palpation left gastrocnemius  SKIN: no suspicious lesions or rashes  NEURO: Normal strength and tone, mentation intact and speech normal  BACK: no CVA tenderness, no paralumbar tenderness  PSYCH: mentation appears normal, affect normal/bright  LYMPH: no cervical, supraclavicular, axillary, or inguinal " adenopathy    EKG - Reviewed and interpreted by me appears normal, NSR, normal axis, normal intervals, no acute ST/T changes c/w ischemia, no LVH by voltage criteria  Office Visit on 03/12/2025   Component Date Value Ref Range Status    CRP Inflammation 03/12/2025 8.33 (H)  <5.00 mg/L Final    Uric Acid 03/12/2025 5.0  2.4 - 5.7 mg/dL Final    LUDMILA interpretation 03/12/2025 Positive (A)  Negative Final      Negative:              <1:40  Borderline Positive:   1:40 - 1:80  Positive:              >1:80    LUDMILA pattern 1 03/12/2025 Speckled   Final    LUDMILA titer 1 03/12/2025 1:160   Final    Rheumatoid Factor 03/12/2025 <10  <14 IU/mL Final    Lyme Disease Antibodies Total 03/12/2025 0.13  <0.90 Final    Non-reactive, Absence of detectable Borrelia burgdorferi antibodies. A non-reactive result does not exclude the possibility of Borrelia burgdorferi infection. If early Lyme disease is suspected, a second sample should be collected and tested 2 to 4 weeks later.     Results for orders placed or performed in visit on 03/26/25   CBC with platelets     Status: Normal   Result Value Ref Range    WBC Count 8.9 4.0 - 11.0 10e3/uL    RBC Count 5.16 3.80 - 5.20 10e6/uL    Hemoglobin 13.9 11.7 - 15.7 g/dL    Hematocrit 43.2 35.0 - 47.0 %    MCV 84 78 - 100 fL    MCH 26.9 26.5 - 33.0 pg    MCHC 32.2 31.5 - 36.5 g/dL    RDW 13.9 10.0 - 15.0 %    Platelet Count 294 150 - 450 10e3/uL   Hemoglobin A1c     Status: Abnormal   Result Value Ref Range    Estimated Average Glucose 117 (H) <117 mg/dL    Hemoglobin A1C 5.7 (H) 0.0 - 5.6 %           Signed Electronically by: Alejandro Gifford MD

## 2025-03-29 LAB
ANNOTATION COMMENT IMP: NORMAL
RETINYL PALMITATE SERPL-MCNC: <0.02 MG/L
VIT A SERPL-MCNC: 0.53 MG/L

## 2025-03-31 ENCOUNTER — THERAPY VISIT (OUTPATIENT)
Dept: PHYSICAL THERAPY | Facility: CLINIC | Age: 50
End: 2025-03-31
Attending: FAMILY MEDICINE
Payer: COMMERCIAL

## 2025-03-31 DIAGNOSIS — H81.10 BENIGN PAROXYSMAL POSITIONAL VERTIGO, UNSPECIFIED LATERALITY: ICD-10-CM

## 2025-03-31 DIAGNOSIS — R42 DIZZINESS: ICD-10-CM

## 2025-03-31 PROCEDURE — 97161 PT EVAL LOW COMPLEX 20 MIN: CPT | Mod: GP

## 2025-03-31 PROCEDURE — 95992 CANALITH REPOSITIONING PROC: CPT | Mod: GP

## 2025-03-31 NOTE — PROGRESS NOTES
PHYSICAL THERAPY EVALUATION  Type of Visit: Evaluation        Fall Risk Screen:  Fall screen completed by: PT  Have you fallen 2 or more times in the past year?: No  Have you fallen and had an injury in the past year?: No  Is patient a fall risk?: No    Subjective         Presenting condition or subjective complaint:   Vertigo after lying down or bending over with head down.  Start to have dizziness.    Date of onset: 25 (order date)      Relevant medical history:     Past Medical History:   Diagnosis Date    Arthritis     Cancer (H)     Cervical high risk HPV (human papillomavirus) test positive 2017: NIL pap, + HR HPV (not 16 or 18) result.     Depression     anxiety, sees therapist    Diabetes (H)     Hypertension     Obesity      Dates & types of surgery:    Past Surgical History:   Procedure Laterality Date    C IUD,MIRENA  2017     SECTION N/A 10/18/2014    Procedure:  SECTION;  Surgeon: Edilma Zayas MD;  [facility]    COLONOSCOPY      ENDOMETRIAL SAMPLING (BIOPSY) N/A 2017    Procedure: ENDOMETRIAL SAMPLING (BIOPSY);  Endometrial Biopsy with Mirena Intrauterine Device Placement;  Surgeon: Samantha Sultana MD;  [facility]    GYN SURGERY  2013    Fallopian Tubes Removed    hsg  11    blockage of both tubes    INSERT INTRAUTERINE DEVICE N/A 2017    Procedure: INSERT INTRAUTERINE DEVICE;;  Surgeon: Samantha Sultana MD;  [facility]    LAPAROSCOPIC GASTRIC SLEEVE  2012    Procedure: LAPAROSCOPIC GASTRIC SLEEVE;  Laparoscopic Sleeve Gastrectomy and Hiatal Hernia Repair;  Surgeon: Eze Muhammad MD;  [facility]    LAPAROSCOPIC SALPINGECTOMY  2013    Procedure: LAPAROSCOPIC SALPINGECTOMY;  Operative Laparoscopy, lysis of adhesions, Bilateral Salpingectomies ;  Surgeon: Abdelrahman Corcoran MD;  [facility]    U/S GUIDANCE FOR TVOR (CLINIC ONLY)  10/2013    [facility] AMNIOCENTESIS DIAGNOSTIC  2014     [facility] AMNIOCENTESIS DIAGNOSTIC  7/31/2014     Prior diagnostic imaging/testing results:     None  Prior therapy history for the same diagnosis, illness or injury: No      Prior Level of Function  Pt IND with all transfers, ambulation, and ADLs/IADLs at baseline.     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     OBSERVATION: Pt in no distress.     PALPATION: N/A     RANGE OF MOTION:  Cervical ROM WFL for vestibular testing. Demonstrating >45deg R/L rotation and >30deg extension.     STRENGTH: LE Strength WFL  UE Strength WFL     BED MOBILITY: Independent, dizziness.     TRANSFERS: Independent     WHEELCHAIR MOBILITY: N/A     GAIT:   Level of [place]: Independent  Assistive Device(s): None  Gait Deviations: WNL  Gait Distance: >50ft during PT eval  Stairs: Deferred.     BALANCE: Standing Balance (static):Good  Standing Balance (dynamic):Good  Will assess further at upcoming session if indicated.     SENSATION: UE Sensation WNL, LE Sensation WNL     COORDINATION: Deferred today.       VESTIBULAR EVALUATION  ADDITIONAL HISTORY:  Description of symptoms: Off balance; Attacks of dizziness; I feel like the room is spinning; Light-headedness  Dizzy attacks:   Start: 3weeks ago   Last attack: Saturday after lying down for bedtime   Frequency of occurrences: Daily unless I move slow and sit, stand lay up and down slowly   Length of attack: 5-10 minutes if I stop and stay still  Difficulty hearing:  No  Noise in ears? No    Alleviates symptoms: Daily unless I move slow and sit, stand lay up and down slowly  Worsens symptoms: Moving  fast, bending and lyng down  Activities that bring on symptoms: Bending over; Turning while walking; Walking up or down stairs       Pertinent visual history: Pt wearing glasses for evaluation.  Pertinent history of current vestibular problem: Motion sickness - uses Meclizine to manage.  DHI: Total Score: (Patient-Rptd) 58/100 points    Cervicogenic Screen    Neck ROM Normal   Vertebral Artery Test Normal     Oculomotor Screen    Ocular ROM Normal   Smooth Pursuit Normal   Saccades Normal   VOR Normal   VOR Cancellation Normal   Convergence Testing Normal        Infrared Goggle Exam Vestibular Suppressant in Last 24 Hours? No  Exam Completed With: Infrared goggles   Spontaneous Nystagmus Negative   Gaze Evoked Nystagmus Negative   Head Shake Horizontal Nystagmus Negative   Positional Testing See below.    Left Right   Rosalinda-Hallpike Negative Upbeating R torsional - mild intensity, symptoms of a 3-4/10 at most, fully fatigued.   [facility] Supine Roll Test Negative Upbeating R torsional, mild intensity, symptoms 1-2/10 at most, fully fatigued.        BPPV Canal(s): R Posterior  BPPV Type: Canalithasis    Assessment & Plan   CLINICAL IMPRESSIONS  Medical Diagnosis: Dizziness (R42), Benign paroxysmal positional vertigo, unspecified laterality (H81.10)    Treatment Diagnosis: R BPPV; dizziness with head movement/change of body position   Impression/Assessment: Patient is a 50 year old female with dizziness and instability complaints.  The following significant findings have been identified: Impaired balance, Decreased activity tolerance, Instability, Dizziness, and Disequilibrium . These impairments interfere with their ability to perform household mobility and community mobility as compared to previous level of function.     Clinical Decision Making (Complexity):  Clinical Presentation: Stable/Uncomplicated  Clinical Presentation Rationale: based on medical and personal factors listed in PT evaluation  Clinical Decision Making  (Complexity): Low complexity    PLAN OF CARE  Treatment Interventions:  Interventions: Gait Training, Manual Therapy, Neuromuscular Re-education, Therapeutic Activity, Therapeutic Exercise, Self-Care/Home Management, Canalith Repositioning    Long Term Goals     PT Goal 1  Goal Identifier: HEP  Goal Description: Pt will demonstrate IND with BPPV repositioning techniques to promote reduction of vestibular symptoms and improve safety with functional mobility, if symptoms return.  Target Date: 04/29/25  PT Goal 2  Goal Identifier: DHI  Goal Description: Pt will report 18pt decrease on DHI (MCID), for subjective reduction of dizziness symptoms with completion of daily activities.  Goal Progress: baseline: 58/100 points  Target Date: 04/29/25  PT Goal 3  Goal Identifier: Positional Testing  Goal Description: Pt will complete BPPV positional testing without observed nystagmus and/or increase of dizziness.  Target Date: 04/29/25      Frequency of Treatment: x2/week with reduction of frequency as appropriate  Duration of Treatment: 30 days    Recommended Referrals to Other Professionals:  None  Education Assessment:   Learner/Method: Patient;Listening;Reading;Demonstration;Pictures/Video    Risks and benefits of evaluation/treatment have been explained.   Patient/Family/caregiver agrees with Plan of Care.     Evaluation Time:     PT Eval, Low Complexity Minutes (31671): 15     Signing Clinician: Jing Liao, PT, DPT, NCS

## 2025-04-07 ENCOUNTER — THERAPY VISIT (OUTPATIENT)
Dept: PHYSICAL THERAPY | Facility: CLINIC | Age: 50
End: 2025-04-07
Attending: FAMILY MEDICINE
Payer: COMMERCIAL

## 2025-04-07 DIAGNOSIS — R42 DIZZINESS: Primary | ICD-10-CM

## 2025-04-07 DIAGNOSIS — H81.10 BENIGN PAROXYSMAL POSITIONAL VERTIGO, UNSPECIFIED LATERALITY: ICD-10-CM

## 2025-04-07 PROCEDURE — 97112 NEUROMUSCULAR REEDUCATION: CPT | Mod: GP

## 2025-04-14 ENCOUNTER — THERAPY VISIT (OUTPATIENT)
Dept: PHYSICAL THERAPY | Facility: CLINIC | Age: 50
End: 2025-04-14
Attending: FAMILY MEDICINE
Payer: COMMERCIAL

## 2025-04-14 DIAGNOSIS — R42 DIZZINESS: Primary | ICD-10-CM

## 2025-04-14 DIAGNOSIS — H81.10 BENIGN PAROXYSMAL POSITIONAL VERTIGO, UNSPECIFIED LATERALITY: ICD-10-CM

## 2025-04-14 PROCEDURE — 97112 NEUROMUSCULAR REEDUCATION: CPT | Mod: GP

## 2025-04-22 ENCOUNTER — OFFICE VISIT (OUTPATIENT)
Dept: RHEUMATOLOGY | Facility: CLINIC | Age: 50
End: 2025-04-22
Attending: FAMILY MEDICINE
Payer: COMMERCIAL

## 2025-04-22 ENCOUNTER — LAB (OUTPATIENT)
Dept: LAB | Facility: CLINIC | Age: 50
End: 2025-04-22
Payer: COMMERCIAL

## 2025-04-22 VITALS
BODY MASS INDEX: 45.42 KG/M2 | HEIGHT: 62 IN | DIASTOLIC BLOOD PRESSURE: 62 MMHG | HEART RATE: 82 BPM | WEIGHT: 246.8 LBS | SYSTOLIC BLOOD PRESSURE: 105 MMHG | OXYGEN SATURATION: 97 %

## 2025-04-22 DIAGNOSIS — R76.8 POSITIVE ANA (ANTINUCLEAR ANTIBODY): Primary | ICD-10-CM

## 2025-04-22 DIAGNOSIS — M25.50 ARTHRALGIA OF MULTIPLE JOINTS: ICD-10-CM

## 2025-04-22 DIAGNOSIS — R76.8 POSITIVE ANA (ANTINUCLEAR ANTIBODY): ICD-10-CM

## 2025-04-22 DIAGNOSIS — R53.82 CHRONIC FATIGUE: ICD-10-CM

## 2025-04-22 LAB
ERYTHROCYTE [SEDIMENTATION RATE] IN BLOOD BY WESTERGREN METHOD: 34 MM/HR (ref 0–20)
Lab: NORMAL
PERFORMING LABORATORY: NORMAL
SPECIMEN STATUS: NORMAL
TEST NAME: NORMAL

## 2025-04-22 PROCEDURE — 86235 NUCLEAR ANTIGEN ANTIBODY: CPT

## 2025-04-22 PROCEDURE — 86140 C-REACTIVE PROTEIN: CPT

## 2025-04-22 PROCEDURE — 3078F DIAST BP <80 MM HG: CPT | Performed by: INTERNAL MEDICINE

## 2025-04-22 PROCEDURE — 99204 OFFICE O/P NEW MOD 45 MIN: CPT | Performed by: INTERNAL MEDICINE

## 2025-04-22 PROCEDURE — 86160 COMPLEMENT ANTIGEN: CPT

## 2025-04-22 PROCEDURE — 3074F SYST BP LT 130 MM HG: CPT | Performed by: INTERNAL MEDICINE

## 2025-04-22 NOTE — PROGRESS NOTES
"  This document was created using a software with less than 100% fidelity, at times resulting in unintended, even erroneous syntax and grammar.  The reader is advised to keep this under consideration while reviewing, interpreting this note.      Rheumatology Consult Note      Deisi Miner     YOB: 1975 Age: 50 year old    Date of visit: 4/22/25    PCP: Alejandro Gifford    Chief Complaint     Fatigue, borderline LUDMILA (1: 80), joint soreness (no swelling)        Assessment and Plan     We discussed the LUDMILA test, its prevalence in the normal population (10-15%, increased prevalence with aging), the nonspecific nature of the LUDMILA test and its association with rheumatologic diseases as well as mild rheumatologic conditions (infection, malignancy, seen in 50% of autoimmune thyroid disorders etc.) An isolated LUDMILA is NOT specific for any single disease and if the symptoms warrant, the need for more specific blood tests discussed to confirm or rule out certain rheumatologic disorders and refine the diagnostic process.  We will check more specific serologies including double-stranded DNA antibody (crithidia), Farmer antibody, SSA and SSB antibodies for Sjogren's, RNP, complement levels.  If these additional serologies are normal then lupus and related autoimmune conditions can be ruled out.    She does not have any \"hard \"features of lupus, we did discuss the possibility of a 3-month \"trial \"of hydroxychloroquine and she will read up about hydroxychloroquine.    Fatigue is a very nonspecific symptom, I suggested talking with her PCP about getting evaluated for sleep apnea as a cause of fatigue.    Follow-up 3 weeks    CC PCP          HPI     Deisi is a very pleasant 50-year-old female referred to rheumatology for a positive LUDMILA (1: 80).  Her main complaint plaint pertains to fatigue which she has noticed for more than a year, perhaps a little longer.  She wakes up unrefreshed.  Her  has noticed " "that she has been napping often at home.  She had some calf soreness, a DVT was ruled out with duplex studies.  The DVT possibly showed some fluid in the Baker's cyst.  She does not have any joint swelling but endorses soreness in the fingers, opening jars etc.  Sometimes her hands \"lock up \", (like a charley horse).  She denies any history of butterfly rash or other lupus type rashes.  Gets some dry eyes and a burning sensation in the eyes, not much dry mouth.  She denies Raynaud's.  There is no history of blood clots.  She denies any history of recurrent mouth sores.  She has had a few falls.  There is no history of pleurisy or pericarditis.  No history of unexplained anemia, leukocytopenia or thrombocytopenia.    Family history is negative for lupus, Sjogren's, rheumatoid arthritis.    Labs: WBC 8.9, hemoglobin 13.9, platelets 294, creatinine 0.85, ALT 20, Lyme negative, RF negative, TSH 1.48, B12 1190, hemoglobin A1c 5.7%.    Duplex left lower extremity negative for clot    Show history.  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  She has 2 children.  Did not specify alcohol or tobacco            Active Problem List     Patient Active Problem List   Diagnosis    CARDIOVASCULAR SCREENING; LDL GOAL LESS THAN 160    S/P laparoscopic sleeve gastrectomy    Cervical high risk HPV (human papillomavirus) test positive    Class 3 severe obesity with serious comorbidity and body mass index (BMI) of 45.0 to 49.9 in adult    Right foot pain    Prediabetes     Past Medical History     Past Medical History:   Diagnosis Date    Arthritis     Cancer (H)     Cervical high risk HPV (human papillomavirus) test positive 05/01/2017 05/01/17: NIL pap, + HR HPV (not 16 or 18) result.     Depression     anxiety, sees therapist    Diabetes (H)     Hypertension     Obesity      Past Surgical History     Past Surgical History:   Procedure Laterality " Date    C IUD,MIRENA  2017     SECTION N/A 10/18/2014    Procedure:  SECTION;  Surgeon: Edilma Zayas MD;  [facility]    COLONOSCOPY      ENDOMETRIAL SAMPLING (BIOPSY) N/A 2017    Procedure: ENDOMETRIAL SAMPLING (BIOPSY);  Endometrial Biopsy with Mirena Intrauterine Device Placement;  Surgeon: Samantha Sultana MD;  [facility]    GYN SURGERY  2013    Fallopian Tubes Removed    hsg  11    blockage of both tubes    INSERT INTRAUTERINE DEVICE N/A 2017    Procedure: INSERT INTRAUTERINE DEVICE;;  Surgeon: Samantha Sultana MD;  [facility]    LAPAROSCOPIC GASTRIC SLEEVE  2012    Procedure: LAPAROSCOPIC GASTRIC SLEEVE;  Laparoscopic Sleeve Gastrectomy and Hiatal Hernia Repair;  Surgeon: Eze Muhammad MD;  [facility]    LAPAROSCOPIC SALPINGECTOMY  2013    Procedure: LAPAROSCOPIC SALPINGECTOMY;  Operative Laparoscopy, lysis of adhesions, Bilateral Salpingectomies ;  Surgeon: Abdelrahman Corcoran MD;  [facility]    U/S GUIDANCE FOR TVOR (CLINIC ONLY)  10/2013    [facility]     Allergy     Allergies   Allergen Reactions    No Known Drug Allergy      Current Medication List     Current Outpatient Medications   Medication Sig Dispense Refill    busPIRone (BUSPAR) 10 MG tablet Take 1 tablet (10 mg) by mouth 2 times daily 180 tablet 3    hydrOXYzine (ATARAX) 25 MG tablet Take 1 tablet (25 mg) by mouth At Bedtime 90 tablet 1    sertraline (ZOLOFT) 100 MG tablet Take 100 mg by mouth daily.      tirzepatide-Weight Management (ZEPBOUND) 5 MG/0.5ML prefilled pen Inject 0.5 mLs (5 mg) subcutaneously every 7 days. 2 mL 2    tirzepatide-Weight Management (ZEPBOUND) 2.5 MG/0.5ML prefilled pen Inject 0.5 mLs (2.5 mg) subcutaneously every 7 days. 2 mL 0     No current facility-administered medications for this visit.       No current facility-administered medications for  "this visit.        Family History     Family History   Problem Relation Age of Onset    Diabetes Maternal Grandmother     Hypertension Maternal Grandmother     Mental Illness Maternal Grandmother     Cerebrovascular Disease Maternal Grandmother     Allergies Father     Hypertension Father     Prostate Cancer Father     Mental Illness Father     Obesity Father     Anxiety Disorder Mother     Mental Illness Mother     Respiratory Brother     Obesity Brother          Physical Exam     COMPREHENSIVE EXAMINATION:  Vitals:    04/22/25 1257   BP: 105/62   BP Location: Right arm   Patient Position: Sitting   Cuff Size: Adult Large   Pulse: 82   SpO2: 97%   Weight: 111.9 kg (246 lb 12.8 oz)   Height: 1.575 m (5' 2\")   Is alert and oriented x 3.    Head/neck: No butterfly rash, no jaundice, no conjunctival pallor, no ocular redness, no facial asymmetry, no enlargement of the major salivary glands, no oral ulcers    Lungs: clear to auscultation, no crackles or wheezing    Heart sounds :regular    Abdomen: Soft, nontender    Musculoskeletal:    Right hand: No synovitis or tenderness of 1-5 MCP joints, no synovitis or tenderness of 1-5 PIP joints.  No swan-neck or boutonniere deformities    Left hand: No synovitis or tenderness of 1-5 MCP joints, no synovitis or tenderness of 1-5 PIP joints, no swan-neck or boutonniere deformities    Right wrist: no synovitis,no tenderness    Left wrist: No tenderness, no synovitis    Elbows: Full range of motion, no swelling or synovitis    Right shoulder: Normal abduction, internal and external rotation    Left shoulder: Normal abduction, internal and external rotation    Cervical spine: Normal flexion, lateral rotation bilaterally full    Spine: No alignment abnormalities noted    Right hip:Full  Flexion internal and external rotation    Left hip: Full flexion, internal and external rotation    Right knee: no effusion, no redness, full ROM    Left knee: No effusion, no redness, full ROM    Rt " ankle: No swelling, redness or tenderness    Left ankle: No swelling, redness or tenderness    Right foot: No swelling, redness or tenderness of the toes/MTPs    Left foot: No swelling, redness or tenderness of the toes/MTP joints  No trigger points for fibromyalgia elicited          Labs / Imaging (select studies)     Rheumatoid Factor   Date Value Ref Range Status   03/12/2025 <10 <14 IU/mL Final     Uric Acid   Date Value Ref Range Status   03/12/2025 5.0 2.4 - 5.7 mg/dL Final      Hemoglobin   Date Value Ref Range Status   03/26/2025 13.9 11.7 - 15.7 g/dL Final   05/03/2023 14.0 11.7 - 15.7 g/dL Final   09/03/2021 12.8 11.7 - 15.7 g/dL Final   03/02/2020 12.3 11.7 - 15.7 g/dL Final   07/10/2017 12.4 11.7 - 15.7 g/dL Final   05/01/2017 12.3 11.7 - 15.7 g/dL Final     Urea Nitrogen   Date Value Ref Range Status   03/26/2025 8.1 6.0 - 20.0 mg/dL Final   05/03/2023 7 7 - 30 mg/dL Final   09/03/2021 7 7 - 30 mg/dL Final   07/10/2017 10 7 - 30 mg/dL Final   07/31/2015 11 7 - 30 mg/dL Final   06/16/2014 7 5 - 24 mg/dL Final     AST   Date Value Ref Range Status   03/26/2025 24 0 - 45 U/L Final   05/03/2023 16 0 - 45 U/L Final   09/03/2021 16 0 - 45 U/L Final   07/10/2017 14 0 - 45 U/L Final   07/31/2015 14 0 - 45 U/L Final   06/16/2014 18 0 - 45 U/L Final     Albumin   Date Value Ref Range Status   03/26/2025 4.2 3.5 - 5.2 g/dL Final   05/03/2023 3.5 3.4 - 5.0 g/dL Final   09/03/2021 3.1 (L) 3.4 - 5.0 g/dL Final   07/10/2017 3.5 3.4 - 5.0 g/dL Final   07/31/2015 3.4 3.4 - 5.0 g/dL Final   06/16/2014 3.4 (L) 3.9 - 5.1 g/dL Final     Alkaline Phosphatase   Date Value Ref Range Status   03/26/2025 49 40 - 150 U/L Final   05/03/2023 42 40 - 150 U/L Final   09/03/2021 39 (L) 40 - 150 U/L Final   07/10/2017 50 40 - 150 U/L Final   07/31/2015 59 40 - 150 U/L Final   06/16/2014 31 (L) 40 - 150 U/L Final     ALT   Date Value Ref Range Status   03/26/2025 20 0 - 50 U/L Final   05/03/2023 19 0 - 50 U/L Final   09/03/2021 23 0 -  50 U/L Final   07/10/2017 19 0 - 50 U/L Final   07/31/2015 20 0 - 50 U/L Final   06/16/2014 17 0 - 50 U/L Final     Rheumatoid Factor   Date Value Ref Range Status   03/12/2025 <10 <14 IU/mL Final          Immunization History     Immunization History   Administered Date(s) Administered    COVID-19 12+ (Pfizer) 01/22/2024    COVID-19 Bivalent 12+ (Pfizer) 11/30/2022    COVID-19 MONOVALENT 12+ (Pfizer) 03/04/2021, 03/24/2021, 12/19/2021    HepB 12/27/2001, 02/13/2002, 03/06/2003    Hepatitis B, Adult (Energix-B/Recombivax HB) 03/06/2003    Influenza Vaccine >6 months,quad, PF 09/16/2014, 10/09/2015, 12/19/2016, 10/29/2020, 11/30/2022, 01/22/2024    Influenza, Split Virus, Trivalent, Pf (Fluzone\Fluarix) 10/28/2024    TD,PF 7+ (Tenivac) 05/26/2005    TDAP (Adacel,Boostrix) 03/12/2025    TDAP Vaccine (Boostrix) 09/11/2014    Td (Adult), Adsorbed 05/26/2005

## 2025-04-23 ENCOUNTER — OFFICE VISIT (OUTPATIENT)
Dept: OBGYN | Facility: CLINIC | Age: 50
End: 2025-04-23
Payer: COMMERCIAL

## 2025-04-23 VITALS
SYSTOLIC BLOOD PRESSURE: 109 MMHG | WEIGHT: 243.7 LBS | HEART RATE: 91 BPM | HEIGHT: 62 IN | DIASTOLIC BLOOD PRESSURE: 77 MMHG | BODY MASS INDEX: 44.85 KG/M2 | OXYGEN SATURATION: 100 %

## 2025-04-23 DIAGNOSIS — Z01.419 ENCOUNTER FOR GYNECOLOGICAL EXAMINATION WITHOUT ABNORMAL FINDING: Primary | Chronic | ICD-10-CM

## 2025-04-23 DIAGNOSIS — Z13.220 SCREENING FOR LIPID DISORDERS: ICD-10-CM

## 2025-04-23 DIAGNOSIS — Z12.4 PAP SMEAR FOR CERVICAL CANCER SCREENING: ICD-10-CM

## 2025-04-23 LAB
C3 SERPL-MCNC: 137 MG/DL (ref 81–157)
C4 SERPL-MCNC: 70 MG/DL (ref 13–39)
CRP SERPL-MCNC: 5.14 MG/L
ENA SM IGG SER IA-ACNC: <0.7 U/ML
ENA SM IGG SER IA-ACNC: NEGATIVE
ENA SS-A AB SER IA-ACNC: 0.7 U/ML
ENA SS-A AB SER IA-ACNC: NEGATIVE
ENA SS-B IGG SER IA-ACNC: <0.6 U/ML
ENA SS-B IGG SER IA-ACNC: NEGATIVE
U1 SNRNP IGG SER IA-ACNC: 1.2 U/ML
U1 SNRNP IGG SER IA-ACNC: NEGATIVE

## 2025-04-23 NOTE — PATIENT INSTRUCTIONS
After having an IUD placed it is normal to have spotting and cramping, you can take Ibuprofen or Tylenol according to the instructions on the bottle and use a heating pad to the lower abdomen as needed.     Please avoid placing anything in the vagina (tampons, intercourse, etc) for 72 hours. The progesterone IUD takes 7 days to be effective for pregnancy prevention so please use condoms for back up contraception if you have intercourse before the 7 day scott but the copper (ParaGard IUD) is effective the same day of placement.     Please call [phone] with any severe abdominal pain, heavy vaginal bleeding, fevers or foul smelling vaginal discharge.     You should check your IUD strings in 2 weeks by placing one or two fingers inside the vagina as high up as you can reach, you should be able to feel the IUD strings (which feel like fishing line), if you can't feel your strings or don't feel comfortable checking yourself you can call clinic to schedule an IUD check.         The menopause manifesto book by Maris Arthur

## 2025-04-23 NOTE — PROGRESS NOTES
Diesi is a 50 year old  female who presents for annual exam.     Menses are regular q 28-30 days and normal lasting 4 days.  Menses flow: light.  Patient's last menstrual period was 2025 (approximate).. Using IUD for contraception.  She is not currently considering pregnancy.  Besides routine health maintenance, she has no other health concerns today . Daughter is 10 now and almost as tall as her.  Aware that her IUD is due for removal or replacement and would like to try this in clinic today.  Realizes we had to go to the operating room last time.  He is currently undergoing workup for possible autoimmune condition.  No menopausal signs or symptoms noted at this time  GYNECOLOGIC HISTORY:  Menarche:  Deisi is sexually active with 1male partner(s) and is currently in monogamous relationship .    History sexually transmitted infections:No STD history  STI testing offered?  Declined  BOB exposure: Unknown  History of abnormal Pap smear: No - age 30- 64 PAP with HPV every 5 years recommended  Family history of breast CA: No  Family history of uterine/ovarian CA: No    Family history of colon CA: No    HEALTH MAINTENANCE:  Cholesterol: (  Cholesterol   Date Value Ref Range Status   2023 192 <200 mg/dL Final   2021 189 <200 mg/dL Final   2020 199 <200 mg/dL Final    History of abnormal lipids: No  Mammo: 3/2025 . History of abnormal Mammo: No.  Regular Self Breast Exams:  sometime  Calcium/Vitamin D intake: source:  dairy, vit d in the winter Adequate? Yes  TSH: (  TSH   Date Value Ref Range Status   2025 1.48 0.30 - 4.20 uIU/mL Final   2023 1.40 0.40 - 4.00 mU/L Final   2020 2.51 0.40 - 4.00 mU/L Final    )  Pap; (  Lab Results   Component Value Date    PAP NIL 2020    PAP NIL 2018    PAP NIL 2017    )    HISTORY:  OB History    Para Term  AB Living   1 1 0 1 0 1   SAB IAB Ectopic Multiple Live Births   0 0 0 1 2      # Outcome Date GA Lbr  Artur/2nd Weight Sex Type Anes PTL Lv   1A  10/18/14 34w1d  1.25 kg (2 lb 12.1 oz) M CS-LTranv Spinal  ND      Name: Altaf      Apgar1: 6  Apgar5: 7   1B  10/18/14 34w1d  1.3 kg (2 lb 13.9 oz) F CS-LTranv Spinal  KARI      Name: Sera      Apgar1: 8  Apgar5: 9     Past Medical History:   Diagnosis Date    Arthritis     Cancer (H)     Cervical high risk HPV (human papillomavirus) test positive 2017: NIL pap, + HR HPV (not 16 or 18) result.     Depression     anxiety, sees therapist    Diabetes (H)     Hypertension     Obesity      Past Surgical History:   Procedure Laterality Date    C IUD,MIRENA  2017     SECTION N/A 10/18/2014    Procedure:  SECTION;  Surgeon: Edilma Zayas MD;  [facility]    COLONOSCOPY      ENDOMETRIAL SAMPLING (BIOPSY) N/A 2017    Procedure: ENDOMETRIAL SAMPLING (BIOPSY);  Endometrial Biopsy with Mirena Intrauterine Device Placement;  Surgeon: Samantha Sultana MD;  [facility]    GYN SURGERY  2013    Fallopian Tubes Removed    hsg  11    blockage of both tubes    INSERT INTRAUTERINE DEVICE N/A 2017    Procedure: INSERT INTRAUTERINE DEVICE;;  Surgeon: Samantha Sultana MD;  [facility]    LAPAROSCOPIC GASTRIC SLEEVE  2012    Procedure: LAPAROSCOPIC GASTRIC SLEEVE;  Laparoscopic Sleeve Gastrectomy and Hiatal Hernia Repair;  Surgeon: Eze Muhammad MD;  [facility]    LAPAROSCOPIC SALPINGECTOMY  2013    Procedure: LAPAROSCOPIC SALPINGECTOMY;  Operative Laparoscopy, lysis of adhesions, Bilateral Salpingectomies ;  Surgeon: Abdelrahman Corcoran MD;  [facility]    U/S GUIDANCE FOR TVOR (CLINIC ONLY)  10/2013    [facility] AMNIOCENTESIS DIAGNOSTIC  2014    [facility] AMNIOCENTESIS DIAGNOSTIC  2014     Family History   Problem Relation Age of Onset    Diabetes Maternal Grandmother     Hypertension Maternal Grandmother     Mental Illness Maternal Grandmother     Cerebrovascular  Disease Maternal Grandmother     Allergies Father     Hypertension Father     Prostate Cancer Father     Mental Illness Father     Obesity Father     Anxiety Disorder Mother     Mental Illness Mother     Respiratory Brother     Obesity Brother      Social History     Socioeconomic History    Marital status:      Spouse name: None    Number of children: 1    Years of education: 18    Highest education level: None   Occupational History    Occupation: professional development     Employer: [employer]     Comment: middle school   Tobacco Use    Smoking status: Never    Smokeless tobacco: Never   Vaping Use    Vaping status: Never Used   Substance and Sexual Activity    Alcohol use: No     Comment: rarely    Drug use: No    Sexual activity: Not Currently     Partners: Male     Birth control/protection: I.U.D.     Comment: no fallopian tubes   Other Topics Concern    Parent/sibling w/ CABG, MI or angioplasty before 65F 55M? No     Social Drivers of Health     Interpersonal Safety: Low Risk  (3/12/2025)    Interpersonal Safety     Do you feel physically and emotionally safe where you currently live?: Yes     Within the past 12 months, have you been hit, slapped, kicked or otherwise physically hurt by someone?: No     Within the past 12 months, have you been humiliated or emotionally abused in other ways by your partner or ex-partner?: No       Current Outpatient Medications:     busPIRone (BUSPAR) 10 MG tablet, Take 1 tablet (10 mg) by mouth 2 times daily, Disp: 180 tablet, Rfl: 3    hydrOXYzine (ATARAX) 25 MG tablet, Take 1 tablet (25 mg) by mouth At Bedtime, Disp: 90 tablet, Rfl: 1    sertraline (ZOLOFT) 100 MG tablet, Take 100 mg by mouth daily., Disp: , Rfl:     tirzepatide-Weight Management (ZEPBOUND) 2.5 MG/0.5ML prefilled pen, Inject 0.5 mLs (2.5 mg) subcutaneously every 7 days., Disp: 2 mL, Rfl: 0    tirzepatide-Weight Management (ZEPBOUND) 5 MG/0.5ML prefilled pen, Inject 0.5 mLs (5 mg)  "subcutaneously every 7 days., Disp: 2 mL, Rfl: 2     Allergies   Allergen Reactions    No Known Drug Allergy        Past medical, surgical, social and family history were reviewed and updated in EPIC.    EXAM:  /77   Pulse 91   Ht 1.575 m (5' 2\")   Wt 110.5 kg (243 lb 11.2 oz)   LMP 02/12/2025 (Approximate)   SpO2 100%   BMI 44.57 kg/m     BMI: Body mass index is 44.57 kg/m .  Constitutional: healthy, alert and no distress  Head: Normocephalic. No masses, lesions, tenderness or abnormalities  Neck: Neck supple. Trachea midline. No adenopathy. Thyroid symmetric, normal size.   Cardiovascular: RRR.   Respiratory: Negative.   Breast: No nodularity, asymmetry or nipple discharge bilaterally.  Gastrointestinal: Abdomen soft, non-tender, non-distended. No masses, organomegaly.  :  Vulva:  No external lesions, normal female hair distribution, no inguinal adenopathy.    Urethra:  Midline, non-tender, well supported, no discharge  Vagina:  Moist, pink, no abnormal discharge, no lesions  Cervix normal but difficult to see. IUD strings seen initially on pt right side  Musculoskeletal: extremities normal  Skin: no suspicious lesions or rashes  Psychiatric: Affect appropriate, cooperative,mentation appears normal.     Procedure:  Extra long Aguillon used and difficult to see the cervix.  Pap smear was obtained and then Xylocaine jelly was applied.  Betadine prep to anterior cervical lip and then 1% lidocaine injected.  Tenaculum placed on cervix and then paracervical block performed.  Initially strings were seen on patient right side but then I could not see them moving the cervix back-and-forth.  Difficult to have tenaculum and alligator in the vagina and patient was not tolerating well.  Unable to find the strings so tenaculum was removed and sites were hemostatic.  Speculum was removed.  Patient tolerated well no EBL.    COUNSELING:   Reviewed preventive health counseling, as reflected in patient instructions    "    ([place])menopause management   reports that she has never smoked. She has never used smokeless tobacco.    Body mass index is 44.57 kg/m .  Weight management plan: already on medication  FRAX Risk Assessment    ASSESSMENT:  50 year old female with satisfactory annual exam  (Z01.419) Encounter for gynecological examination without abnormal finding  (primary encounter diagnosis)  Comment: mirena 7/2017  Plan: Read through previous op note from 2017 with prior placement and this was also difficult necessitating operating room under IV sedation.  Even with that, was unable to use sounding instrument and used instead endometrial Pipelle due to extremely anteverted uterus.  Discussed she is pretty close to menopause and although it has been 8 years, could just leave IUD in place until after menopause and then attempt removal versus leave in place for ever.  If she desires IUD removal replacement, this would need to be done in the operating room under IV sedation via hysteroscopy and this was briefly discussed today.  She will let me know if she desires that.    (Z12.4) Pap smear for cervical cancer screening  Plan: HPV and Gynecologic Cytology Panel -         Recommended Age 30-65 Years        Discussed sending pap smear for HPV typing as well and that if both pap and HPV are negative, then can have q5 year pap smears.    (Z13.220) Screening for lipid disorders  Comment: last done 2023  Plan: Lipid panel reflex to direct LDL Fasting        RTC fasting for lab    Samantha Sultana MD

## 2025-04-24 LAB
HPV HR 12 DNA CVX QL NAA+PROBE: NEGATIVE
HPV16 DNA CVX QL NAA+PROBE: NEGATIVE
HPV18 DNA CVX QL NAA+PROBE: NEGATIVE
HUMAN PAPILLOMA VIRUS FINAL DIAGNOSIS: NORMAL

## 2025-04-28 LAB
BKR AP ASSOCIATED HPV REPORT: NORMAL
BKR LAB AP GYN ADEQUACY: NORMAL
BKR LAB AP GYN INTERPRETATION: NORMAL
BKR LAB AP PREVIOUS ABNORMAL: NORMAL
PATH REPORT.COMMENTS IMP SPEC: NORMAL
PATH REPORT.COMMENTS IMP SPEC: NORMAL
PATH REPORT.RELEVANT HX SPEC: NORMAL

## 2025-05-19 ENCOUNTER — VIRTUAL VISIT (OUTPATIENT)
Dept: RHEUMATOLOGY | Facility: CLINIC | Age: 50
End: 2025-05-19
Payer: COMMERCIAL

## 2025-05-19 VITALS — WEIGHT: 233 LBS | BODY MASS INDEX: 42.88 KG/M2 | HEIGHT: 62 IN

## 2025-05-19 DIAGNOSIS — R53.82 CHRONIC FATIGUE: ICD-10-CM

## 2025-05-19 DIAGNOSIS — R76.8 POSITIVE ANA (ANTINUCLEAR ANTIBODY): Primary | ICD-10-CM

## 2025-05-19 DIAGNOSIS — M25.50 ARTHRALGIA OF MULTIPLE JOINTS: ICD-10-CM

## 2025-05-19 PROCEDURE — 1126F AMNT PAIN NOTED NONE PRSNT: CPT | Mod: 95 | Performed by: INTERNAL MEDICINE

## 2025-05-19 PROCEDURE — 98005 SYNCH AUDIO-VIDEO EST LOW 20: CPT | Performed by: INTERNAL MEDICINE

## 2025-05-19 ASSESSMENT — PAIN SCALES - GENERAL: PAINLEVEL_OUTOF10: NO PAIN (0)

## 2025-05-19 NOTE — PROGRESS NOTES
"Virtual Visit Details    Type of service:  Video Visit     Originating Location (pt. Location): Home    Distant Location (provider location):  Off-site  Platform used for Video Visit: Armida    Face-to-face video time: 8:18 AM-8:26 AM      Assessment and Plan                Rheumatology follow-up visit note         HPI  Expand All Collapse All  Patient would like to try hydroxychloroquine for 2-3 months.  Prescription for hydroxychloroquine sent to the pharmacy.  This document was created using a software with less than 100% fidelity, at times resulting in unintended, even erroneous syntax and grammar.  The reader is advised to keep this under consideration while reviewing, interpreting this note.        Rheumatology Consult Note      Deisi Miner     YOB: 1975 Age: 50 year old    Date of visit: 4/22/25    PCP: Alejandro Gifford     Chief Complaint      Fatigue, borderline LUDMILA (1: 80), joint soreness (no swelling)           Assessment and Plan   Deisi started hydroxychloroquine in early May, is \"starting to feel different \", generally \"less achy \"in the hands and knees.  Fatigue is better (not taking as many naps as before).  She endorses that \"my body feels better \".  We will keep her on hydroxychloroquine and reevaluate her in 3 months.    Total time spent 21 minutes including face-to-face communication, documentation and review of records.    Next follow-up August 25, 2025.    CC PCP                HPI      Deisi is a very pleasant 50-year-old female referred to rheumatology for a positive LUDMILA (1: 80).  Her main complaint plaint pertains to fatigue which she has noticed for more than a year, perhaps a little longer.  She wakes up unrefreshed.  Her  has noticed that she has been napping often at home.  She had some calf soreness, a DVT was ruled out with duplex studies.  The DVT possibly showed some fluid in the Baker's cyst.  She does not have any joint swelling but endorses soreness " "in the fingers, opening jars etc.  Sometimes her hands \"lock up \", (like a charley horse).  She denies any history of butterfly rash or other lupus type rashes.  Gets some dry eyes and a burning sensation in the eyes, not much dry mouth.  She denies Raynaud's.  There is no history of blood clots.  She denies any history of recurrent mouth sores.  She has had a few falls.  There is no history of pleurisy or pericarditis.  No history of unexplained anemia, leukocytopenia or thrombocytopenia.     Family history is negative for lupus, Sjogren's, rheumatoid arthritis.     Labs: WBC 8.9, hemoglobin 13.9, platelets 294, creatinine 0.85, ALT 20, Lyme negative, RF negative, TSH 1.48, B12 1190, hemoglobin A1c 5.7%.     Duplex left lower extremity negative for clot     Show history.  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  She has 2 children.  Did not specify alcohol or tobacco                     DETAILED EXAMINATION  25  :    Vitals:    25 0756   Weight: 105.7 kg (233 lb)   Height: 1.575 m (5' 2\")     Alert oriented. Head including the face is examined for malar rash, heliotropes, scarring, lupus pernio. Eyes examined for redness such as in episcleritis/scleritis, periorbital lesions.    She is moving her upper extremities without difficulty.    Denies swelling or redness in her lower extremities.     Patient Active Problem List    Diagnosis Date Noted    Class 3 severe obesity with serious comorbidity and body mass index (BMI) of 45.0 to 49.9 in adult (H) 2017     Priority: High     242lb (BMI 44)- 2021    High weight in life 260  Consult for bariatric surgery 245lb  presurg 228lb  1 year post op 158lb (BMI 28)  2 years postop- pregnancy with twins, lost one twin,  and postpartum weight gain       Prediabetes 2021     Priority: Medium    Right foot pain 2019     Priority: Medium    Cervical " high risk HPV (human papillomavirus) test positive 2017     Priority: Medium     17: NIL pap, + HR HPV (not 16 or 18) result. Plan cotest in 1 year.  18: NIL pap, Neg HR HPV result. Plan cotest in 3 years.   3/2/20 NIL, Neg HPV. Plan 5 yr co-test      S/P laparoscopic sleeve gastrectomy 2012     Priority: Medium     High weight in life 206  Consult for bariatric surgery 245lb  Sleeve - 228lb  1 year post op 158lb (BMI 28)  2 years postop- pregnancy with twins, lost one twin,  and postpartum weight gain       CARDIOVASCULAR SCREENING; LDL GOAL LESS THAN 160 2010     Priority: Medium     Past Surgical History:   Procedure Laterality Date    C IUD,MIRENA  2017     SECTION N/A 10/18/2014    Procedure:  SECTION;  Surgeon: Edilma Zayas MD;  [facility]    COLONOSCOPY      ENDOMETRIAL SAMPLING (BIOPSY) N/A 2017    Procedure: ENDOMETRIAL SAMPLING (BIOPSY);  Endometrial Biopsy with Mirena Intrauterine Device Placement;  Surgeon: Samantha Sultana MD;  [facility]    GYN SURGERY  2013    Fallopian Tubes Removed    hsg  11    blockage of both tubes    INSERT INTRAUTERINE DEVICE N/A 2017    Procedure: INSERT INTRAUTERINE DEVICE;;  Surgeon: Samantha Sultana MD;  [facility]    LAPAROSCOPIC GASTRIC SLEEVE  2012    Procedure: LAPAROSCOPIC GASTRIC SLEEVE;  Laparoscopic Sleeve Gastrectomy and Hiatal Hernia Repair;  Surgeon: Eze Muhammad MD;  [facility]    LAPAROSCOPIC SALPINGECTOMY  2013    Procedure: LAPAROSCOPIC SALPINGECTOMY;  Operative Laparoscopy, lysis of adhesions, Bilateral Salpingectomies ;  Surgeon: Abdelrahman Corcoran MD;  [facility]    U/S GUIDANCE FOR TVOR (CLINIC ONLY)  10/2013    [facility]    [facility]      Past Medical History:   Diagnosis Date    Arthritis     Cancer (H)     Cervical high risk HPV (human  papillomavirus) test positive 05/01/2017 05/01/17: NIL pap, + HR HPV (not 16 or 18) result.     Depression     anxiety, sees therapist    Diabetes (H)     Hypertension     Obesity      Allergies   Allergen Reactions    No Known Drug Allergy      Current Outpatient Medications   Medication Sig Dispense Refill    busPIRone (BUSPAR) 10 MG tablet Take 1 tablet (10 mg) by mouth 2 times daily 180 tablet 3    hydroxychloroquine (PLAQUENIL) 200 MG tablet Take 1 tablet (200 mg) by mouth 2 times daily. Take once daily for a week and then increase it to twice daily. 180 tablet 1    hydroxychloroquine (PLAQUENIL) 200 MG tablet Take 1 tablet (200 mg) by mouth 2 times daily. 60 tablet 2    hydrOXYzine (ATARAX) 25 MG tablet Take 1 tablet (25 mg) by mouth At Bedtime 90 tablet 1    levonorgestrel (MIRENA) 52 MG (20 mcg/day) IUD 1 each by Intrauterine route once.      sertraline (ZOLOFT) 100 MG tablet Take 100 mg by mouth daily.      tirzepatide-Weight Management (ZEPBOUND) 2.5 MG/0.5ML prefilled pen Inject 0.5 mLs (2.5 mg) subcutaneously every 7 days. 2 mL 0    tirzepatide-Weight Management (ZEPBOUND) 5 MG/0.5ML prefilled pen Inject 0.5 mLs (5 mg) subcutaneously every 7 days. 2 mL 2     family history includes Allergies in her father; Anxiety Disorder in her mother; Cerebrovascular Disease in her maternal grandmother; Diabetes in her maternal grandmother; Hypertension in her father and maternal grandmother; Mental Illness in her father, maternal grandmother, and mother; Obesity in her brother and father; Prostate Cancer in her father; Respiratory in her brother.  Social Connections: Not on file          WBC   Date Value Ref Range Status   03/02/2020 8.1 4.0 - 11.0 10e9/L Final     WBC Count   Date Value Ref Range Status   03/26/2025 8.9 4.0 - 11.0 10e3/uL Final     RBC Count   Date Value Ref Range Status   03/26/2025 5.16 3.80 - 5.20 10e6/uL Final   03/02/2020 4.61 3.8 - 5.2 10e12/L Final     Hemoglobin   Date Value Ref Range  Status   03/26/2025 13.9 11.7 - 15.7 g/dL Final   03/02/2020 12.3 11.7 - 15.7 g/dL Final     Hematocrit   Date Value Ref Range Status   03/26/2025 43.2 35.0 - 47.0 % Final   03/02/2020 37.5 35.0 - 47.0 % Final     MCV   Date Value Ref Range Status   03/26/2025 84 78 - 100 fL Final   03/02/2020 81 78 - 100 fl Final     MCH   Date Value Ref Range Status   03/26/2025 26.9 26.5 - 33.0 pg Final   03/02/2020 26.7 26.5 - 33.0 pg Final     Platelet Count   Date Value Ref Range Status   03/26/2025 294 150 - 450 10e3/uL Final   03/02/2020 302 150 - 450 10e9/L Final     % Lymphocytes   Date Value Ref Range Status   08/13/2015 19.0 % Final     AST   Date Value Ref Range Status   03/26/2025 24 0 - 45 U/L Final   07/10/2017 14 0 - 45 U/L Final     ALT   Date Value Ref Range Status   03/26/2025 20 0 - 50 U/L Final   07/10/2017 19 0 - 50 U/L Final     Albumin   Date Value Ref Range Status   03/26/2025 4.2 3.5 - 5.2 g/dL Final   05/03/2023 3.5 3.4 - 5.0 g/dL Final   07/10/2017 3.5 3.4 - 5.0 g/dL Final     Alkaline Phosphatase   Date Value Ref Range Status   03/26/2025 49 40 - 150 U/L Final   07/10/2017 50 40 - 150 U/L Final     Creatinine   Date Value Ref Range Status   03/26/2025 0.85 0.51 - 0.95 mg/dL Final   07/10/2017 0.78 0.52 - 1.04 mg/dL Final     GFR Estimate   Date Value Ref Range Status   03/26/2025 84 >60 mL/min/1.73m2 Final     Comment:     eGFR calculated using 2021 CKD-EPI equation.   07/10/2017 81 >60 mL/min/1.7m2 Final     Comment:     Non  GFR Calc     GFR Estimate If Black   Date Value Ref Range Status   07/10/2017 >90   GFR Calc   >60 mL/min/1.7m2 Final     Erythrocyte Sedimentation Rate   Date Value Ref Range Status   04/22/2025 34 (H) 0 - 20 mm/hr Final

## 2025-05-19 NOTE — NURSING NOTE
Current patient location: Pt will be in vehicle at work per pt    Is the patient currently in the state of [place]? NO    Visit mode: VIDEO    If the visit is dropped, the patient can be reconnected by:VIDEO VISIT: Text to cell phone:   Telephone Information:   Mobile [phone]       Will anyone else be joining the visit? NO  (If patient encounters technical issues they should call [phone])    Are changes needed to the allergy or medication list? Pt stated no med changes    Are refills needed on medications prescribed by this physician? NO    Rooming Documentation:  Questionnaire(s) completed    Reason for visit: RECHECK    No other vitals to report per pt    Alisa Morrow VVF

## 2025-07-01 ENCOUNTER — VIRTUAL VISIT (OUTPATIENT)
Facility: CLINIC | Age: 50
End: 2025-07-01
Payer: COMMERCIAL

## 2025-07-01 DIAGNOSIS — F43.23 ADJUSTMENT DISORDER WITH MIXED ANXIETY AND DEPRESSED MOOD: Primary | ICD-10-CM

## 2025-07-01 NOTE — PROGRESS NOTES
"[facility]         PATIENT'S NAME: Deisi Miner  PREFERRED NAME: Deisi  PRONOUNS:  she, her, hers  MRN: [id number]  : 1975  ADDRESS: [address]  [facility] [address]  [facility]  [id number]  DATE OF SERVICE: 25  START TIME: 8:05 AM  END TIME: 9:00  PREFERRED PHONE: [phone]  May we leave a program related message: Yes  EMERGENCY CONTACT: was obtained      HANNA MINER (Spouse)  [phone]     SERVICE MODALITY:  Video Visit:      Provider verified identity through the following two step process.  Patient provided:  Patient photo, Patient is known previously to provider, and Patient was verified at admission/transfer    Telemedicine Visit: The patient's condition can be safely assessed and treated via synchronous audio and visual telemedicine encounter.      Reason for Telemedicine Visit: Patient has requested telehealth visit    Originating Site (Patient Location): Patient's home    Distant Site (Provider Location): Provider Remote Setting- Home Office    Consent:  The patient/guardian has verbally consented to: the potential risks and benefits of telemedicine (video visit) versus in person care; bill my insurance or make self-payment for services provided; and responsibility for payment of non-covered services.     Patient would like the video invitation sent by:  My Chart    Mode of Communication:  Video Conference via Amwell    Distant Location (Provider):  Off-site    As the provider I attest to compliance with applicable laws and regulations related to telemedicine.    UNIVERSAL ADULT Mental Health DIAGNOSTIC ASSESSMENT    Identifying Information:  Patient is a 50 year old,    individual.  Patient was referred for an assessment by self .  Patient attended the session alone.    Chief Complaint:   The reason for seeking services at this time is: \"stress \".  The problem(s) began 2024 beginning of the school year .    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 She is also a mother and is currently navigating day to day life stressors. Patient endorsed concerns related to interpersonal relationships and shared that her brother recently passed away. She is working on balancing personal boundaries while remaining open to connection and emotional growth.    Social/Family History:  Patient reported they grew up in [place], [place] and [place].  They were raised by biological mother and father, majority mother upon moving to [place] as her father moved back to [place]. Patient reported father was a alcohalic and  Parents .  Patient reported that their childhood was inconsistent.  Patient described their current relationships with family of origin as \"in  the reconnection phase\".   When patient was younger she had a brother who was murdered at age 19. And a brother in and out of [employer] growing up when patient was an adolescent.    The patient describes their cultural background as  .  Cultural influences and impact on patient's life structure, values, norms, and healthcare:  .  Contextual influences on patient's health include: denies impact. These factors will be addressed in the Preliminary Treatment plan. Patient identified their preferred language to be english. Patient reported they does not need the assistance of an  or other support involved in therapy.     Patient reported had no significant delays in developmental tasks.   Patient's highest education level was graduate school.  Patient identified the following learning problems: none reported.  Modifications will be used to assist communication in therapy. Patient reports they are  able to understand written materials.    Patient reported the following " "relationship history .  Patient's current relationship status is  for 19 years.   Patient identified their sexual orientation as heterosexual.  Patient reported having 1 daughter age 10 and 1 child  18 days after birth child(delgado). Patient identified friends, sister, spouse as part of their support system.  Patient identified the quality of these relationships as \"open and honest\"      Patient's current living/housing situation involves home with spouse and daughter  The immediate members of family and household include spouse and daughter and they report that housing is stable.    Patient is currently employed.  Patient reports their finances are obtained through full time employment. Patient does not identify finances as a current stressor.      Patient reported that they have not been involved with the legal system. Patient has not reported being under probation/ parole/ jurisdiction. They are not under any current court jurisdiction. .    Patient's Strengths and Limitations:  Patient identified the following strengths or resources that will help them succeed in treatment: Oriental orthodox / Nondenominational, commitment to health and well being, community involvement, friends / good social support, family support, insight, intelligence, positive work environment, motivation, sense of humor, strong social skills, and work ethic. Things that may interfere with the patient's success in treatment include: none identified.     Assessments:  The following assessments were completed by patient for this visit:  PHQ9:       3/2/2020    10:58 AM 2021    11:51 AM 2023     5:04 PM 2023     1:51 PM 2023     8:42 AM 3/3/2025    12:26 PM 2025     2:00 PM   PHQ-9 SCORE   PHQ-9 Total Score MyChart   2 (Minimal depression) 8 (Mild depression) 15 (Moderately severe depression) 4 (Minimal depression)    PHQ-9 Total Score 10 6 2 8 15 4  5       Patient-reported     GAD7:       3/27/2016     9:57 PM 3/2/2020    " 10:58 AM 7/9/2021    11:51 AM 8/12/2021    10:45 AM 9/29/2021    10:07 PM 8/9/2023     8:42 AM 5/23/2025     2:00 PM   DENISE-7 SCORE   Total Score 8 (mild anxiety)   12 (moderate anxiety) 3 (minimal anxiety) 20 (severe anxiety)    Total Score  12 13 12 3 20 4     CAGE-AID:       7/1/2025     8:00 AM   CAGE-AID Total Score   Total Score 0     PROMIS 10-Global Health (all questions and answers displayed):       3/24/2025    11:36 AM 5/23/2025     2:58 PM   PROMIS 10   In general, would you say your health is: Good    In general, would you say your quality of life is: Good    In general, how would you rate your physical health? Fair    In general, how would you rate your mental health, including your mood and your ability to think? Fair    In general, how would you rate your satisfaction with your social activities and relationships? Fair    In general, please rate how well you carry out your usual social activities and roles Good    To what extent are you able to carry out your everyday physical activities such as walking, climbing stairs, carrying groceries, or moving a chair? Moderately    In the past 7 days, how often have you been bothered by emotional problems such as feeling anxious, depressed, or irritable? Often    In the past 7 days, how would you rate your fatigue on average? Severe    In the past 7 days, how would you rate your pain on average, where 0 means no pain, and 10 means worst imaginable pain? 7    In general, would you say your health is: 3 3   In general, would you say your quality of life is: 3 4   In general, how would you rate your physical health? 2 3   In general, how would you rate your mental health, including your mood and your ability to think? 2 3   In general, how would you rate your satisfaction with your social activities and relationships? 2 2   In general, please rate how well you carry out your usual social activities and roles. (This includes activities at home, at work and in your  community, and responsibilities as a parent, child, spouse, employee, friend, etc.) 3 3   To what extent are you able to carry out your everyday physical activities such as walking, climbing stairs, carrying groceries, or moving a chair? 3 5   In the past 7 days, how often have you been bothered by emotional problems such as feeling anxious, depressed, or irritable? 4 2   In the past 7 days, how would you rate your fatigue on average? 4 2   In the past 7 days, how would you rate your pain on average, where 0 means no pain, and 10 means worst imaginable pain? 7 1   Global Mental Health Score 9  13   Global Physical Health Score 9  16   PROMIS TOTAL - SUBSCORES 18  29       Patient-reported     Marshall Suicide Severity Rating Scale (Lifetime/Recent)      5/23/2025     2:00 PM   Marshall Suicide Severity Rating (Lifetime/Recent)   1. Wish to be Dead (Lifetime) N   2. Non-Specific Active Suicidal Thoughts (Lifetime) N   Actual Attempt (Lifetime) N   Has subject engaged in non-suicidal self-injurious behavior? (Lifetime) N   Calculated C-SSRS Risk Score (Lifetime/Recent) No Risk Indicated       Personal and Family Medical History:  Patient did report a family history of mental health concerns.  Patient reports family history includes Allergies in her father; Anxiety Disorder in her mother; Cerebrovascular Disease in her maternal grandmother; Diabetes in her maternal grandmother; Hypertension in her father and maternal grandmother; Mental Illness in her father, maternal grandmother, and mother; Obesity in her brother and father; Prostate Cancer in her father; Respiratory in her brother.    Patient does report Mental Health Diagnosis and/or Treatment.  Patient reported the following previous diagnoses which include(s): MDD and DENISE.  Patient reported symptoms began 2013.  Patient has received mental health services in the past:  Therapy.  Psychiatric Hospitalizations: None    Patient denies a history of civil commitment.       Currently, patient is receiving other mental health services.  These include psychiatry with patient unable to recall name.  Next appointment: TBD.       Patient has had a physical exam to rule out medical causes for current symptoms.  Date of last physical exam was within the past year. Client was encouraged to follow up with PCP if symptoms were to develop. The patient has a [facility] Primary Care Provider, who is named Alejandro Gifford..  Patient reports the following current medical concerns: see medical history .  Patient denies any issues with pain..   There are not significant appetite / nutritional concerns / weight changes.   Patient does not report a history of head injury / trauma / cognitive impairment.      Patient reports current meds as:   Current Outpatient Medications   Medication Sig Dispense Refill    busPIRone (BUSPAR) 10 MG tablet Take 1 tablet (10 mg) by mouth 2 times daily 180 tablet 3    hydroxychloroquine (PLAQUENIL) 200 MG tablet Take 1 tablet (200 mg) by mouth 2 times daily. Take once daily for a week and then increase it to twice daily. 180 tablet 1    hydroxychloroquine (PLAQUENIL) 200 MG tablet Take 1 tablet (200 mg) by mouth 2 times daily. 60 tablet 2    hydrOXYzine (ATARAX) 25 MG tablet Take 1 tablet (25 mg) by mouth At Bedtime 90 tablet 1    levonorgestrel (MIRENA) 52 MG (20 mcg/day) IUD 1 each by Intrauterine route once.      sertraline (ZOLOFT) 100 MG tablet Take 100 mg by mouth daily.      tirzepatide-Weight Management (ZEPBOUND) 2.5 MG/0.5ML prefilled pen Inject 0.5 mLs (2.5 mg) subcutaneously every 7 days. 2 mL 0    tirzepatide-Weight Management (ZEPBOUND) 5 MG/0.5ML prefilled pen Inject 0.5 mLs (5 mg) subcutaneously every 7 days. 2 mL 2     Current Facility-Administered Medications   Medication Dose Route Frequency Provider Last Rate Last Admin    levonorgestrel (MIRENA) 52 MG (20 mcg/day) IUD 1 each  1 each Intrauterine See Admin Instructions            Medication  Adherence:  Patient reports   taking psychiatric medications as prescribed.    Patient Allergies:    Allergies   Allergen Reactions    No Known Drug Allergy        Medical History:    Past Medical History:   Diagnosis Date    Arthritis     Cancer (H)     Cervical high risk HPV (human papillomavirus) test positive 05/01/2017 05/01/17: NIL pap, + HR HPV (not 16 or 18) result.     Depression     anxiety, sees therapist    Diabetes (H)     Hypertension     Obesity      Therapist inquired about allergies. Patient to follow up with medication provider if allergies develop or persist.    Current Mental Status Exam:   Appearance:  Appropriate    Eye Contact:  Good   Psychomotor:  Normal       Gait / station:  not observed  Attitude / Demeanor: Pleasant Guarded   Speech      Rate / Production: Normal/ Responsive      Volume:  Normal  volume      Language:  intact  Mood:   Anxious   Affect:   Appropriate    Thought Content: Clear   Thought Process: Coherent       Associations: No loosening of associations  Insight:   Good   Judgment:  Intact   Orientation:  All  Attention/concentration: Good    Substance Use:   Patient did report a family history of substance use concerns; see medical history section for details.  Patient has not received chemical dependency treatment in the past.  Patient has not ever been to detox.      Patient is not currently receiving any chemical dependency treatment. Patient reported the following problems as a result of their substance use:  none.    Patient Patient denies any history of substance use.   Patient reports obtaining substances from n/a.    Substance Use: No symptoms    Based on the CAGE score of 0 and clinical interview there  are not indications of drug or alcohol abuse.    Significant Losses / Trauma / Abuse / Neglect Issues:   Patient did not serve in the .  There are not indications or report of significant loss, trauma, abuse or neglect issues related to: are no  indications and client denies any losses, trauma, abuse, or neglect concerns.  Patient has not been a victim of exploitation.  Concerns for possible neglect are not present. Emotional neglect    Safety Assessment:   Patient denies current or past homicidal ideation and behaviors.  Patient denies current or past suicidal ideation and behaviors.  Patient denies current or past self-injurious behaviors.  Patient denied risk behaviors associated with substance use.  Patient denies any high risk behaviors associated with mental health symptoms.  Patient denied current or past personal safety concerns.    Patient denies past of current/recent assaultive behaviors.    Patient denied a history of sexual assault behaviors.     Patient reports there are firearms in the house and locked up.    Patient reports the following protective factors: hopeful, identifies reason for living, access to and engagement with healthcare, current engagement in treatment and/or motivation to establish therapeutic relationship, strong bond to family unit, community, job, school, etc, supportive social network or family, fear of death or dying due to pain/suffering, lives in a responsibly safe environment, responsibilities to others, belief that suicide is immoral and/or high spiritual values, and reality testing ability      Risk Plan:  See Recommendations for Safety and Risk Management Plan    Review of Symptoms per patient report:   Depression: Feeling sad, down, or depressed, Feelings of hopelessness, Change in energy level, and Difficulties concentrating  Emperatriz:  No Symptoms  Psychosis: No Symptoms  Anxiety: Nervousness and Physical complaints, such as headaches, stomachaches, muscle tension  Panic:  No symptoms  Post Traumatic Stress Disorder:  No Symptoms   Eating Disorder: No Symptoms  ADD / ADHD:  No symptoms  Conduct Disorder: No symptoms  Autism Spectrum Disorder: No symptoms   Obsessive Compulsive Disorder: No Symptoms  Personality  Disorders:  No Symptoms     Patient reports the following compulsive behaviors and treatment history: None.      Diagnostic Criteria:   Adjustment Disorder  A. The development of emotional or behavioral symptoms in response to an identifiable stressor(s) occurring within 3 months of the onset of the stressor(s)  B. These symptoms or behaviors are clinically significant, as evidenced by one or both of the following:       - Marked distress that is out of proportion to the severity/intensity of the stressor (with consideration for external context & culture)       - Significant impairment in social, occupational, or other important areas of functioning  C. The stress-related disturbance does not meet criteria for another disorder & is not not an exacerbation of another mental disorder  D. The symptoms do not represent normal bereavement  E. Once the stressor or its consequences have terminated, the symptoms do not persist for more than an additional 6 months       * Adjustment Disorder with Mixed Anxiety and Depressed Mood: The predominant manifestation is a combination of depression and anxiety    Functional Status:  Patient reports the following functional impairments:  management of the household and or completion of tasks and work / vocational responsibilities.     Nonprogrammatic care:  Patient is requesting basic services to address current mental health concerns.    Clinical Summary:  1. Psychosocial Factors:  Relationship concerns, Interpersonal concerns.    2. Principal DSM5 Diagnoses  (Sustained by DSM5 Criteria Listed Above):   Adjustment Disorders  309.28 (F43.23) With mixed anxiety and depressed mood.  3. Other Diagnoses that is relevant to services:   Deferred.  4. Provisional Diagnosis:  deferred  5. Prognosis: Relieve Acute Symptoms.  6. Likely consequences of symptoms if not treated: Likely exacerbation of symptoms requiring higher level of care.  7. Patient strengths include:  educated, empathetic,  employed, has a previous history of therapy, insightful, intelligent, responsible parent, and support of family, friends and providers .     Recommendations:     1. Plan for Safety and Risk Management:   Safety and Risk: Recommended that patient call 911 or go to the local ED should there be a change in any of these risk factors        Report to child / adult protection services was NA.     2. Patient's identified no cultural concerns to be addressed within treatment.     3. Initial Treatment will focus on:    Anxiety - Psychoeducation on anxiety, developing coping skills, cognitive behavioral therapy, stress management, building emotional regulation skills and exploring career-related anxiety .     4. Resources/Service Plan:    services are not indicated.   Modifications to assist communication are not indicated.   Additional disability accommodations are not indicated.      5. Collaboration:   Collaboration / coordination of treatment will be initiated with the following  support professionals: primary care physician.      6.  Referrals:   The following referral(s) will be initiated: [facility].       A Release of Information has been obtained for the following: none at this time.     Clinical Substantiation/medical necessity for the above recommendations:  Patient meets clinical necessity  - without treatment symptoms may become more severe and further affect functionality. Current functional impairments include management of the household and or completion of tasks and work / vocational responsibilities.    7. ALEJANDRA:    ALEJANDRA:  Discussed the general effects of drugs and alcohol on health and well-being. Provider gave patient printed information about the  effects of chemical use on their health and well being. Recommendations:  Recommendation to continue using substances in a healthy manner. No indications of CD issues.     8. Records:   These were reviewed at time of  assessment.   Information in this assessment was obtained from the medical record and  provided by patient who is a good historian.    Patient will have open access to their mental health medical record.    9.   Interactive Complexity: No    10. Safety Plan: No Safety plan indicated    Provider Name/ Credentials:  Heather Coffey [facility]    July 1, 2025

## 2025-07-08 ENCOUNTER — MYC MEDICAL ADVICE (OUTPATIENT)
Dept: ENDOCRINOLOGY | Facility: CLINIC | Age: 50
End: 2025-07-08
Payer: COMMERCIAL

## 2025-07-08 DIAGNOSIS — E66.813 CLASS 3 SEVERE OBESITY WITH SERIOUS COMORBIDITY AND BODY MASS INDEX (BMI) OF 45.0 TO 49.9 IN ADULT, UNSPECIFIED OBESITY TYPE (H): Primary | ICD-10-CM

## 2025-07-15 ENCOUNTER — VIRTUAL VISIT (OUTPATIENT)
Facility: CLINIC | Age: 50
End: 2025-07-15
Payer: COMMERCIAL

## 2025-07-15 ENCOUNTER — LAB (OUTPATIENT)
Dept: LAB | Facility: CLINIC | Age: 50
End: 2025-07-15
Payer: COMMERCIAL

## 2025-07-15 DIAGNOSIS — F41.1 GENERALIZED ANXIETY DISORDER: Primary | ICD-10-CM

## 2025-07-15 DIAGNOSIS — R76.8 POSITIVE ANA (ANTINUCLEAR ANTIBODY): ICD-10-CM

## 2025-07-15 DIAGNOSIS — Z13.220 SCREENING FOR LIPID DISORDERS: ICD-10-CM

## 2025-07-15 DIAGNOSIS — M25.50 ARTHRALGIA OF MULTIPLE JOINTS: ICD-10-CM

## 2025-07-15 LAB
ALBUMIN SERPL BCG-MCNC: 4 G/DL (ref 3.5–5.2)
ALT SERPL W P-5'-P-CCNC: 16 U/L (ref 0–50)
CHOLEST SERPL-MCNC: 197 MG/DL
CREAT SERPL-MCNC: 1.05 MG/DL (ref 0.51–0.95)
EGFRCR SERPLBLD CKD-EPI 2021: 65 ML/MIN/1.73M2
ERYTHROCYTE [DISTWIDTH] IN BLOOD BY AUTOMATED COUNT: 14.6 % (ref 10–15)
FASTING STATUS PATIENT QL REPORTED: YES
HCT VFR BLD AUTO: 42.5 % (ref 35–47)
HDLC SERPL-MCNC: 49 MG/DL
HGB BLD-MCNC: 13.9 G/DL (ref 11.7–15.7)
LDLC SERPL CALC-MCNC: 128 MG/DL
MCH RBC QN AUTO: 26.9 PG (ref 26.5–33)
MCHC RBC AUTO-ENTMCNC: 32.7 G/DL (ref 31.5–36.5)
MCV RBC AUTO: 82 FL (ref 78–100)
NONHDLC SERPL-MCNC: 148 MG/DL
PLATELET # BLD AUTO: 303 10E3/UL (ref 150–450)
RBC # BLD AUTO: 5.16 10E6/UL (ref 3.8–5.2)
TRIGL SERPL-MCNC: 100 MG/DL
WBC # BLD AUTO: 8.6 10E3/UL (ref 4–11)

## 2025-07-15 PROCEDURE — 3049F LDL-C 100-129 MG/DL: CPT

## 2025-07-15 PROCEDURE — 36415 COLL VENOUS BLD VENIPUNCTURE: CPT

## 2025-07-15 PROCEDURE — 82565 ASSAY OF CREATININE: CPT

## 2025-07-15 PROCEDURE — 90837 PSYTX W PT 60 MINUTES: CPT | Mod: 95

## 2025-07-15 PROCEDURE — 85027 COMPLETE CBC AUTOMATED: CPT

## 2025-07-15 PROCEDURE — 84460 ALANINE AMINO (ALT) (SGPT): CPT

## 2025-07-15 PROCEDURE — 82040 ASSAY OF SERUM ALBUMIN: CPT

## 2025-07-15 PROCEDURE — 80061 LIPID PANEL: CPT

## 2025-07-15 ASSESSMENT — ANXIETY QUESTIONNAIRES
2. NOT BEING ABLE TO STOP OR CONTROL WORRYING: MORE THAN HALF THE DAYS
GAD7 TOTAL SCORE: 10
6. BECOMING EASILY ANNOYED OR IRRITABLE: NOT AT ALL
5. BEING SO RESTLESS THAT IT IS HARD TO SIT STILL: MORE THAN HALF THE DAYS
4. TROUBLE RELAXING: MORE THAN HALF THE DAYS
GAD7 TOTAL SCORE: 10
7. FEELING AFRAID AS IF SOMETHING AWFUL MIGHT HAPPEN: NOT AT ALL
8. IF YOU CHECKED OFF ANY PROBLEMS, HOW DIFFICULT HAVE THESE MADE IT FOR YOU TO DO YOUR WORK, TAKE CARE OF THINGS AT HOME, OR GET ALONG WITH OTHER PEOPLE?: SOMEWHAT DIFFICULT
IF YOU CHECKED OFF ANY PROBLEMS ON THIS QUESTIONNAIRE, HOW DIFFICULT HAVE THESE PROBLEMS MADE IT FOR YOU TO DO YOUR WORK, TAKE CARE OF THINGS AT HOME, OR GET ALONG WITH OTHER PEOPLE: SOMEWHAT DIFFICULT
7. FEELING AFRAID AS IF SOMETHING AWFUL MIGHT HAPPEN: NOT AT ALL
3. WORRYING TOO MUCH ABOUT DIFFERENT THINGS: MORE THAN HALF THE DAYS
GAD7 TOTAL SCORE: 10
1. FEELING NERVOUS, ANXIOUS, OR ON EDGE: MORE THAN HALF THE DAYS

## 2025-07-15 NOTE — PROGRESS NOTES
[facility]                                     Progress Note    Patient Name: Deisi Miner  Date: 07/15/2025          Service Type: Individual      Session Start Time: 8:01  Session End Time: 9:00     Session Length: 59    Session #: 3    Attendees: Client attended alone    Service Modality:  Video Visit:      Provider verified identity through the following two step process.  Patient provided:  Patient photo, Patient is known previously to provider, and Patient was verified at admission/transfer    Telemedicine Visit: The patient's condition can be safely assessed and treated via synchronous audio and visual telemedicine encounter.      Reason for Telemedicine Visit: Patient has requested telehealth visit    Originating Site (Patient Location): Patient's home    Distant Site (Provider Location): Provider Remote Setting- Home Office    Consent:  The patient/guardian has verbally consented to: the potential risks and benefits of telemedicine (video visit) versus in person care; bill my insurance or make self-payment for services provided; and responsibility for payment of non-covered services.     Patient would like the video invitation sent by:  My Chart    Mode of Communication:  Video Conference via [employer]    Distant Location (Provider):  Off-site    As the provider I attest to compliance with applicable laws and regulations related to telemedicine.    DATA  Extended Session (53+ minutes): PROLONGED SERVICE IN THE OUTPATIENT SETTING REQUIRING DIRECT (FACE-TO-FACE) PATIENT CONTACT BEYOND THE USUAL SERVICE:    - Patient's presenting concerns require more intensive intervention than could be completed within the usual service  Provider utilized clinical judgment in determining that, given topics discussed in session, it was most clinically beneficial to the patient to have a lengthier session to adequately provide closure to the session.   Interactive Complexity: No  Crisis: No          Progress Since Last Session (Related to Symptoms / Goals / Homework):   Symptoms: No change      Homework: Achieved / completed to satisfaction      Episode of Care Goals: No improvement - PREPARATION (Decided to change - considering how); Intervened by negotiating a change plan and determining options / strategies for behavior change, identifying triggers, exploring social supports, and working towards setting a date to begin behavior change     Current / Ongoing Stressors and Concerns:   Interval history: Deisi Miner, 50-year-old female, reported ongoing difficulty with overthinking, feeling stuck in thoughts, and challenges with being present in daily life. Noted increased communication and reconnection with family, particularly her sister, since August 2024, leading to recurring thoughts and emotional triggers related to family dynamics. Described physical sensations of stress, especially in shoulders, and awareness of holding tension. Experiences cycles of self-doubt, guilt, sadness, confusion, and occasional hopelessness, particularly in relation to her roles as mother, wife, and at work. Noted a recent difficult day at work with a new supervisor, leading to increased overthinking and self-reflection. Reported using mindful exercises and breathing techniques to manage stress and emotional discomfort.      Treatment Objective(s) Addressed in This Session:   - Challenge and/or defuse from unhelpful thinking patterns  - Recognize and focus on what is within personal control  - Utilize mindfulness and acceptance practices daily  - Engage in one values-based action per day  - Utilize one coping skill per day  - Maintain healthy self-care practices (ie., sleep hygiene, physical activity, healthy nutrition)  - Utilize support system weekly  - Practice one self-regulation skill per day (eg., deep breathing, grounding)  - Practice healthy communication skills daily  - Utilize one organization or planning  skill per day       Intervention:   Collaboratively set specific, measurable, achievable, relevant, and time-bound (SMART) goals with the client.     Assessments completed prior to visit:  The following assessments were completed by patient for this visit:  PHQ2:   Phq2 (   1999 [employer],All Rights Reserved. Used With Permission. Developed By Jennifer Vance [employer]en[employer] Col[employer]agues,With An Educational Murphy From [employer].)    7/15/2025  7:56 AM CDT - Filed by Patient 7/1/2025  7:50 AM CDT - Filed by Patient   The following questionnaire should only be answered by the patient. Are you the patient? Yes Yes   Over the last 2 weeks, how often have you been bothered by any of the following problems?     Q1: Little interest or pleasure in doing things Not at all Several days   Q2: Feeling down, depressed or hopeless Not at all Not at all   PHQ2 (   1999 [employer],ALL RIGHTS RESERVED. USED WITH PERMISSION. DEVELOPED BY LONI LOPEZ,JENNIFER VARELA,[employer] AND [employer],WITH AN EDUCATIONAL MURPHY FROM [employer].)     PHQ-2 Score (range: 0 - 6) 0 1       PHQ9:       3/2/2020    10:58 AM 7/9/2021    11:51 AM 1/9/2023     5:04 PM 4/4/2023     1:51 PM 8/9/2023     8:42 AM 3/3/2025    12:26 PM 5/23/2025     2:00 PM   PHQ-9 SCORE   PHQ-9 Total Score MyChart   2 (Minimal depression) 8 (Mild depression) 15 (Moderately severe depression) 4 (Minimal depression)    PHQ-9 Total Score 10 6 2 8 15 4  5       Patient-reported     GAD2:       7/1/2025     7:50 AM 7/15/2025     7:57 AM   DENISE-2   Feeling nervous, anxious, or on edge 1 2   Not being able to stop or control worrying 1 2   DENISE-2 Total Score 2  4        Patient-reported     GAD7:       3/2/2020    10:58 AM 7/9/2021    11:51 AM 8/12/2021    10:45 AM 9/29/2021    10:07 PM 8/9/2023     8:42 AM 5/23/2025     2:00 PM 7/15/2025     7:57 AM   DENISE-7 SCORE   Total Score   12 (moderate anxiety) 3 (minimal anxiety) 20 (severe anxiety)   10 (moderate anxiety)   Total Score 12 13 12 3 20 4 10        Patient-reported         ASSESSMENT: Current Emotional / Mental Status (status of significant symptoms):   Risk status (Self / Other harm or suicidal ideation)   Patient denies current fears or concerns for personal safety.   Patient denies current or recent suicidal ideation or behaviors.   Patient denies current or recent homicidal ideation or behaviors.   Patient denies current or recent self injurious behavior or ideation.   Patient denies other safety concerns.   Patient reports there has been no change in risk factors since their last session.     Patient reports there has been no change in protective factors since their last session.     Recommended that patient call 911 or go to the local ED should there be a change in any of these risk factors     Appearance:   Appropriate    Eye Contact:   Good    Psychomotor Behavior: Normal    Attitude:   Cooperative    Orientation:   All   Speech    Rate / Production: Talkative Normal     Volume:  Normal    Mood:    Anxious    Affect:    Appropriate    Thought Content:  Clear    Thought Form:  Coherent  Logical    Insight:    Good      Medication Review:   No changes to current psychiatric medication(s)     Medication Compliance:   Yes     Changes in Health Issues:   None reported     Chemical Use Review:   Substance Use: Chemical use reviewed, no active concerns identified      Tobacco Use: No current tobacco use.      Diagnosis:  No diagnosis found.    Collateral Reports Completed:   Not Applicable    PLAN: (Patient Tasks / Therapist Tasks / Other)  Based on our discussion, I have outlined the following instructions for you:    Next appointment(s): - Follow-up appointment on August 5, 2025  - Discussed homework and assignment: patient was instructed to use a willingness template to write down thoughts, emotions, and sensations she is willing to feel, and to identify one actionable step each day based on  "her values (such as empathy, connection, caring, kindness, and respect). Patient was encouraged to keep a notebook for these reflections and to practice daily micro-actions aligned with her values.    Thank you again for your visit, and we look forward to supporting you in your journey to better health.         Heather Coffey, NICOLSW                                                         ______________________________________________________________________    Individual Treatment Plan    Patient's Name: Deisi Miner  YOB: 1975    Date of Creation: 7/15/2025  Date Treatment Plan Last Reviewed/Revised:     DSM5 Diagnoses: 300.02 (F41.1) Generalized Anxiety Disorder  Psychosocial / Contextual Factors:   PROMIS (reviewed every 90 days):   PROMIS-10 Scores        3/24/2025    11:36 AM 5/23/2025     2:58 PM   PROMIS-10 Total Score w/o Sub Scores   PROMIS TOTAL - SUBSCORES 18  29       Patient-reported      Referral / Collaboration:  Referral to another professional/service is not indicated at this time..    Anticipated number of session for this episode of care: 9-12 sessions  Anticipation frequency of session: Weekly  Anticipated Duration of each session: 38-52 minutes  Treatment plan will be reviewed in 90 days or when goals have been changed.       MeasurableTreatment Goal(s) related to diagnosis / functional impairment(s)  Goal 1: Patient will learn to manage anxiety in healthy ways.     I will know I've met my goal when I can read a book with focus.\"     Objective #A (Patient Action)    Patient will challenge and/or defuse from unhelpful thinking patterns and recognize and focus on what is within personal control.  Status: New - Date: 7/15/2025     Intervention(s)  Therapist will teach Four A's of Acceptance (Acknowledge, Allow, Accommodate and Appreciate).      Patient has not reviewed nor agreed to the above plan.      Heather Coffey, NICOLSW  July 15, 2025   "

## 2025-08-10 ENCOUNTER — MYC MEDICAL ADVICE (OUTPATIENT)
Dept: OBGYN | Facility: CLINIC | Age: 50
End: 2025-08-10
Payer: COMMERCIAL

## 2025-08-10 DIAGNOSIS — T83.9XXA COMPLICATION OF INTRAUTERINE DEVICE (IUD), UNSPECIFIED COMPLICATION, INITIAL ENCOUNTER: Primary | ICD-10-CM

## 2025-08-11 ENCOUNTER — MYC MEDICAL ADVICE (OUTPATIENT)
Dept: ENDOCRINOLOGY | Facility: CLINIC | Age: 50
End: 2025-08-11
Payer: COMMERCIAL

## 2025-08-11 DIAGNOSIS — E66.813 CLASS 3 SEVERE OBESITY WITH SERIOUS COMORBIDITY AND BODY MASS INDEX (BMI) OF 45.0 TO 49.9 IN ADULT, UNSPECIFIED OBESITY TYPE (H): ICD-10-CM

## 2025-08-12 ENCOUNTER — TELEPHONE (OUTPATIENT)
Dept: OBGYN | Facility: CLINIC | Age: 50
End: 2025-08-12
Payer: COMMERCIAL

## 2025-08-12 PROBLEM — T83.9XXA COMPLICATION OF INTRAUTERINE DEVICE (IUD), UNSPECIFIED COMPLICATION, INITIAL ENCOUNTER: Status: ACTIVE | Noted: 2025-08-10

## 2025-08-21 ENCOUNTER — VIRTUAL VISIT (OUTPATIENT)
Facility: CLINIC | Age: 50
End: 2025-08-21
Payer: COMMERCIAL

## 2025-08-21 DIAGNOSIS — F41.1 GENERALIZED ANXIETY DISORDER: Primary | ICD-10-CM

## 2025-08-26 ENCOUNTER — VIRTUAL VISIT (OUTPATIENT)
Dept: ENDOCRINOLOGY | Facility: CLINIC | Age: 50
End: 2025-08-26
Payer: COMMERCIAL

## 2025-08-26 VITALS — WEIGHT: 221 LBS | BODY MASS INDEX: 40.67 KG/M2 | HEIGHT: 62 IN

## 2025-08-26 DIAGNOSIS — E66.813 CLASS 3 SEVERE OBESITY WITH SERIOUS COMORBIDITY AND BODY MASS INDEX (BMI) OF 45.0 TO 49.9 IN ADULT, UNSPECIFIED OBESITY TYPE (H): Primary | ICD-10-CM

## 2025-08-26 PROCEDURE — 1126F AMNT PAIN NOTED NONE PRSNT: CPT | Mod: 95

## 2025-08-26 PROCEDURE — 98004 SYNCH AUDIO-VIDEO EST SF 10: CPT

## 2025-08-26 ASSESSMENT — PAIN SCALES - GENERAL: PAINLEVEL_OUTOF10: NO PAIN (0)

## 2025-09-04 ENCOUNTER — VIRTUAL VISIT (OUTPATIENT)
Facility: CLINIC | Age: 50
End: 2025-09-04
Payer: COMMERCIAL

## 2025-09-04 DIAGNOSIS — F41.1 GENERALIZED ANXIETY DISORDER: Primary | ICD-10-CM

## (undated) DEVICE — GLOVE PROTEXIS W/NEU-THERA 6.0  2D73TE60

## (undated) DEVICE — JELLY LUBRICATING SURGILUBE 2OZ TUBE

## (undated) DEVICE — LINEN GOWN X4 5410

## (undated) DEVICE — Device

## (undated) DEVICE — PAD CHUX UNDERPAD 30X30"

## (undated) DEVICE — DILATOR PROBE UTERINE OS CANAL FINDER 260-610

## (undated) DEVICE — LINEN TOWEL PACK X5 5464

## (undated) DEVICE — SOL NACL 0.9% IRRIG 1000ML BOTTLE 2F7124

## (undated) DEVICE — SUCTION CURETTE 3MM ENDOMETRIAL MX140

## (undated) DEVICE — GLOVE PROTEXIS BLUE W/NEU-THERA 6.5  2D73EB65

## (undated) DEVICE — STRAP KNEE/BODY 31143004

## (undated) DEVICE — DECANTER TRANSFER DEVICE 2008S

## (undated) RX ORDER — ONDANSETRON 2 MG/ML
INJECTION INTRAMUSCULAR; INTRAVENOUS
Status: DISPENSED
Start: 2017-07-12

## (undated) RX ORDER — PHENYLEPHRINE HCL IN 0.9% NACL 1 MG/10 ML
SYRINGE (ML) INTRAVENOUS
Status: DISPENSED
Start: 2017-07-12

## (undated) RX ORDER — FENTANYL CITRATE 50 UG/ML
INJECTION, SOLUTION INTRAMUSCULAR; INTRAVENOUS
Status: DISPENSED
Start: 2017-07-12

## (undated) RX ORDER — LIDOCAINE HYDROCHLORIDE 20 MG/ML
INJECTION, SOLUTION EPIDURAL; INFILTRATION; INTRACAUDAL; PERINEURAL
Status: DISPENSED
Start: 2017-07-12

## (undated) RX ORDER — PROPOFOL 10 MG/ML
INJECTION, EMULSION INTRAVENOUS
Status: DISPENSED
Start: 2017-07-12

## (undated) RX ORDER — KETOROLAC TROMETHAMINE 30 MG/ML
INJECTION, SOLUTION INTRAMUSCULAR; INTRAVENOUS
Status: DISPENSED
Start: 2017-07-12

## (undated) RX ORDER — IBUPROFEN 600 MG/1
TABLET, FILM COATED ORAL
Status: DISPENSED
Start: 2017-07-12